# Patient Record
Sex: FEMALE | Race: WHITE | NOT HISPANIC OR LATINO | Employment: FULL TIME | URBAN - METROPOLITAN AREA
[De-identification: names, ages, dates, MRNs, and addresses within clinical notes are randomized per-mention and may not be internally consistent; named-entity substitution may affect disease eponyms.]

---

## 2017-01-03 ENCOUNTER — APPOINTMENT (OUTPATIENT)
Dept: URGENT CARE | Age: 27
End: 2017-01-03
Payer: COMMERCIAL

## 2017-01-03 ENCOUNTER — APPOINTMENT (OUTPATIENT)
Dept: LAB | Age: 27
End: 2017-01-03
Attending: NURSE PRACTITIONER
Payer: COMMERCIAL

## 2017-01-03 ENCOUNTER — TRANSCRIBE ORDERS (OUTPATIENT)
Dept: URGENT CARE | Age: 27
End: 2017-01-03

## 2017-01-03 DIAGNOSIS — J04.0 ACUTE LARYNGITIS: ICD-10-CM

## 2017-01-03 PROCEDURE — 87070 CULTURE OTHR SPECIMN AEROBIC: CPT

## 2017-01-03 PROCEDURE — G0382 LEV 3 HOSP TYPE B ED VISIT: HCPCS

## 2017-01-03 PROCEDURE — 99283 EMERGENCY DEPT VISIT LOW MDM: CPT

## 2017-01-05 LAB — BACTERIA THROAT CULT: NORMAL

## 2017-01-17 ENCOUNTER — OFFICE VISIT (OUTPATIENT)
Dept: URGENT CARE | Facility: MEDICAL CENTER | Age: 27
End: 2017-01-17
Payer: COMMERCIAL

## 2017-01-17 PROCEDURE — 99283 EMERGENCY DEPT VISIT LOW MDM: CPT

## 2017-01-17 PROCEDURE — G0382 LEV 3 HOSP TYPE B ED VISIT: HCPCS

## 2017-03-24 ENCOUNTER — GENERIC CONVERSION - ENCOUNTER (OUTPATIENT)
Dept: OTHER | Facility: OTHER | Age: 27
End: 2017-03-24

## 2017-04-12 ENCOUNTER — ALLSCRIPTS OFFICE VISIT (OUTPATIENT)
Dept: OTHER | Facility: OTHER | Age: 27
End: 2017-04-12

## 2017-04-12 LAB
BILIRUB UR QL STRIP: NORMAL
CLARITY UR: NORMAL
COLOR UR: YELLOW
GLUCOSE (HISTORICAL): NORMAL
HGB UR QL STRIP.AUTO: 1
KETONES UR STRIP-MCNC: NORMAL MG/DL
LEUKOCYTE ESTERASE UR QL STRIP: NORMAL
NITRITE UR QL STRIP: NORMAL
PH UR STRIP.AUTO: NORMAL [PH]
PROT UR STRIP-MCNC: NORMAL MG/DL
SP GR UR STRIP.AUTO: NORMAL
UROBILINOGEN UR QL STRIP.AUTO: NORMAL

## 2017-04-12 PROCEDURE — 87510 GARDNER VAG DNA DIR PROBE: CPT | Performed by: NURSE PRACTITIONER

## 2017-04-12 PROCEDURE — 87480 CANDIDA DNA DIR PROBE: CPT | Performed by: NURSE PRACTITIONER

## 2017-04-12 PROCEDURE — 87660 TRICHOMONAS VAGIN DIR PROBE: CPT | Performed by: NURSE PRACTITIONER

## 2017-04-13 ENCOUNTER — LAB REQUISITION (OUTPATIENT)
Dept: LAB | Facility: HOSPITAL | Age: 27
End: 2017-04-13
Payer: COMMERCIAL

## 2017-04-13 DIAGNOSIS — Z11.3 ENCOUNTER FOR SCREENING FOR INFECTIONS WITH PREDOMINANTLY SEXUAL MODE OF TRANSMISSION: ICD-10-CM

## 2017-04-13 PROCEDURE — 87491 CHLMYD TRACH DNA AMP PROBE: CPT | Performed by: NURSE PRACTITIONER

## 2017-04-13 PROCEDURE — 87591 N.GONORRHOEAE DNA AMP PROB: CPT | Performed by: NURSE PRACTITIONER

## 2017-04-15 LAB
CANDIDA RRNA VAG QL PROBE: NEGATIVE
CHLAMYDIA DNA CVX QL NAA+PROBE: NORMAL
G VAGINALIS RRNA GENITAL QL PROBE: NEGATIVE
N GONORRHOEA DNA GENITAL QL NAA+PROBE: NORMAL
T VAGINALIS RRNA GENITAL QL PROBE: NEGATIVE

## 2017-04-18 ENCOUNTER — GENERIC CONVERSION - ENCOUNTER (OUTPATIENT)
Dept: OTHER | Facility: OTHER | Age: 27
End: 2017-04-18

## 2017-10-25 ENCOUNTER — ALLSCRIPTS OFFICE VISIT (OUTPATIENT)
Dept: OTHER | Facility: OTHER | Age: 27
End: 2017-10-25

## 2017-11-02 ENCOUNTER — ALLSCRIPTS OFFICE VISIT (OUTPATIENT)
Dept: OTHER | Facility: OTHER | Age: 27
End: 2017-11-02

## 2017-11-02 DIAGNOSIS — F90.9 ATTENTION-DEFICIT HYPERACTIVITY DISORDER: ICD-10-CM

## 2017-11-02 NOTE — PROGRESS NOTES
Assessment  1  Acute laryngitis (464 00) (J04 0)    Plan  Acute laryngitis    · Zithromax Z-Alber 250 MG Oral Tablet (Azithromycin); TAKE 2 TABLETS ON DAY 1  THEN TAKE 1 TABLET A DAY FOR 4 DAYS    Discussion/Summary    Start abxof fluids and restif worsening  Chief Complaint  Patient presents today c/o runny nose, cough with yellow sputum and sore throat since Thursday  Patient has tried OTC's with no relief  History of Present Illness  HPI: since last thursday has been having sore throat, cough, runny, improvingvoice      Review of Systems    Constitutional: no fever,-- not feeling poorly,-- no chills-- and-- not feeling tired  ENT: sore throat-- and-- nasal discharge, but-- no earache,-- no nosebleeds-- and-- no hearing loss  Cardiovascular: the heart rate was not slow,-- no chest pain,-- the heart rate was not fast-- and-- no palpitations  Respiratory: cough, but-- no shortness of breath,-- no wheezing-- and-- no shortness of breath during exertion  Gastrointestinal: no abdominal pain,-- no nausea,-- no constipation-- and-- no diarrhea  Genitourinary: no dysuria,-- no pelvic pain,-- no incontinence-- and-- no dysmenorrhea  Musculoskeletal: no arthralgias,-- no joint swelling,-- no myalgias-- and-- no joint stiffness  Neurological: no headache,-- no numbness,-- no confusion-- and-- no dizziness  Active Problems  1  Acute conjunctivitis (372 00) (H10 30)   2  Acute laryngitis (464 00) (J04 0)   3  Acute rhinitis (460) (J00)   4  Attention-deficit hyperactivity disorder (314 01) (F90 9)   5  Conjunctivitis (372 30) (H10 9)   6  Contact dermatitis (692 9) (L25 9)   7  Dysmenorrhea (625 3) (N94 6)   8  Encounter for insertion of mirena IUD (V25 11) (Z30 430)   9  Encounter for IUD removal (V25 12) (Z30 432)   10  Encounter for other contraceptive management (V25 8) (Z30 8)   11   Encounter for routine gynecological examination with Papanicolaou smear of cervix    (V72 31,V76 2) (Z01 419) 12  Facial rash (782 1) (R21)   13  Female pelvic pain (625 9) (R10 2)   14  Human papilloma virus infection (079 4) (B97 7)   15  Immunity status testing (V72 61) (Z01 84)   16  IUD check up (V25 42) (Z30 431)   17  Denied: History of IUD contraception   18  IUD threads lost (996 32)   19  Need for diphtheria-tetanus-pertussis (Tdap) vaccine (V06 1) (Z23)   20  Need for measles-mumps-rubella (MMR) vaccine (V06 4) (Z23)   21  Postcoital bleeding (626 7) (N93 0)   22  PPD screening test (V74 1) (Z11 1)   23  Routine Gynecological Exam With Cervical Pap Smear (V72 31)   24  Screening for STD (sexually transmitted disease) (V74 5) (Z11 3)   25  Sore throat (462) (J02 9)   26  Symptoms involving urinary system (788 99) (R39 9)   27  Unconfirmed pregnancy (V72 40) (Z32 00)   28  Vaginal discharge (623 5) (N89 8)   29  Vaginal itching (698 1) (L29 8)   30  Vulvar burning (625 9) (N94 89)    Past Medical History  Active Problems And Past Medical History Reviewed: The active problems and past medical history were reviewed and updated today  Surgical History  Surgical History Reviewed: The surgical history was reviewed and updated today  Social History   · Being A Social Drinker   ·    · Denied: History of Drug Use   · Denied: History of IUD contraception   · Never A Smoker  The social history was reviewed and updated today  The social history was reviewed and is unchanged  Family History  Family History Reviewed: The family history was reviewed and updated today  Current Meds   1  Amphetamine-Dextroamphet ER 20 MG Oral Capsule Extended Release 24 Hour; TAKE   1 CAPSULE DAILY; Therapy: 22WMT1014 to (Evaluate:16Nov2017); Last Rx:19Oct2017 Ordered   2  NuvaRing 0 12-0 015 MG/24HR Vaginal Ring; INSERT 1 RING VAGINALLY FOR 3   WEEKS THEN 1 WEEK OFF; Therapy: 90AJH4911 to (Evaluate:63Lxn4732)  Requested for: 21Apr2017; Last   Rx:21Apr2017 Ordered   3   PredniSONE 10 MG Oral Tablet; 5,8,4,3,6,4;   Therapy: 16UGB4486 to (Last Rx:17Jan2017)  Requested for: 94UZA7446 Ordered   4  Triamcinolone Acetonide 0 1 % External Cream; APPLY  AND RUB  IN A THIN FILM TO   AFFECTED AREAS TWICE DAILY  (AM AND PM)  FOR 5 DAYS; Therapy: 20KDW9648 to (Last Rx:17Jan2017)  Requested for: 74JJE3486 Ordered    The medication list was reviewed and updated today  Allergies  1  LevoFLOXacin TABS  2  No Known Environmental Allergies   3  No Known Food Allergies    Vitals   Recorded: 74EJJ2796 12:21PM   Temperature 97 8 F   Heart Rate 85   Respiration 16   Systolic 393, LUE, Sitting   Diastolic 78, LUE, Sitting   Height 5 ft 4 in   Weight 134 lb 0 6 oz   BMI Calculated 23 01   BSA Calculated 1 65   O2 Saturation 96     Physical Exam    Constitutional   General appearance: No acute distress, well appearing and well nourished  Eyes   Conjunctiva and lids: No swelling, erythema or discharge  Pupils and irises: Equal, round and reactive to light  Ears, Nose, Mouth, and Throat   External inspection of ears and nose: Normal     Otoscopic examination: Tympanic membranes translucent with normal light reflex  Canals patent without erythema  Nasal mucosa, septum, and turbinates: Normal without edema or erythema  Oropharynx: Normal with no erythema, edema, exudate or lesions  Pulmonary   Respiratory effort: No increased work of breathing or signs of respiratory distress  Auscultation of lungs: Clear to auscultation  Cardiovascular   Palpation of heart: Normal PMI, no thrills  Auscultation of heart: Normal rate and rhythm, normal S1 and S2, without murmurs  Examination of extremities for edema and/or varicosities: Normal     Carotid pulses: Normal     Abdomen   Abdomen: Non-tender, no masses  Liver and spleen: No hepatomegaly or splenomegaly  Lymphatic   Palpation of lymph nodes in neck: No lymphadenopathy      Musculoskeletal   Gait and station: Normal     Digits and nails: Normal without clubbing or cyanosis  Inspection/palpation of joints, bones, and muscles: Normal     Skin   Skin and subcutaneous tissue: Normal without rashes or lesions  Psychiatric   Orientation to person, place, and time: Normal     Mood and affect: Normal          Future Appointments    Date/Time Provider Specialty Site   11/02/2017 02:30 PM CAITIE Mckeon   [facility]     Signatures   Electronically signed by : Giovanna Lin; Nov 1 2017  3:26PM EST                       (Author)    Electronically signed by : Bong Burns DO; Nov 1 2017  3:35PM EST                       (Author)

## 2017-11-08 ENCOUNTER — LAB CONVERSION - ENCOUNTER (OUTPATIENT)
Dept: OTHER | Facility: OTHER | Age: 27
End: 2017-11-08

## 2017-11-08 LAB — QUESTION/PROBLEM (HISTORICAL): NORMAL

## 2017-11-09 ENCOUNTER — LAB CONVERSION - ENCOUNTER (OUTPATIENT)
Dept: OTHER | Facility: OTHER | Age: 27
End: 2017-11-09

## 2017-11-09 LAB
CONTACT: (HISTORICAL): NORMAL
TEST INFORMATION (HISTORICAL): NORMAL
TEST NAME (HISTORICAL): NORMAL

## 2017-12-28 ENCOUNTER — ALLSCRIPTS OFFICE VISIT (OUTPATIENT)
Dept: OTHER | Facility: OTHER | Age: 27
End: 2017-12-28

## 2017-12-28 DIAGNOSIS — Z01.818 ENCOUNTER FOR OTHER PREPROCEDURAL EXAMINATION: ICD-10-CM

## 2017-12-28 LAB — HCG, QUALITATIVE (HISTORICAL): NEGATIVE

## 2017-12-29 ENCOUNTER — LAB CONVERSION - ENCOUNTER (OUTPATIENT)
Dept: OTHER | Facility: OTHER | Age: 27
End: 2017-12-29

## 2017-12-29 LAB
DEPRECATED RDW RBC AUTO: 12 % (ref 11–15)
HCT VFR BLD AUTO: 37.6 % (ref 35–45)
HGB BLD-MCNC: 13.2 G/DL (ref 11.7–15.5)
MCH RBC QN AUTO: 31.1 PG (ref 27–33)
MCHC RBC AUTO-ENTMCNC: 35.1 G/DL (ref 32–36)
MCV RBC AUTO: 88.5 FL (ref 80–100)
PLATELET # BLD AUTO: 315 THOUSAND/UL (ref 140–400)
PMV BLD AUTO: 10 FL (ref 7.5–12.5)
RBC # BLD AUTO: 4.25 MILLION/UL (ref 3.8–5.1)
WBC # BLD AUTO: 6.2 THOUSAND/UL (ref 3.8–10.8)

## 2018-01-10 NOTE — MISCELLANEOUS
Message         Active Problems    1  Acute conjunctivitis (372 00) (H10 30)   2  Acute laryngitis (464 00) (J04 0)   3  Acute rhinitis (460) (J00)   4  Attention-deficit hyperactivity disorder (314 01) (F90 9)   5  Conjunctivitis (372 30) (H10 9)   6  Contact dermatitis (692 9) (L25 9)   7  Dysmenorrhea (625 3) (N94 6)   8  Encounter for insertion of mirena IUD (V25 11) (Z30 430)   9  Encounter for IUD removal (V25 12) (Z30 432)   10  Encounter for other contraceptive management (V25 8) (Z30 8)   11  Encounter for routine gynecological examination with Papanicolaou smear of cervix    (V72 31,V76 2) (Z01 419)   12  Facial rash (782 1) (R21)   13  Female pelvic pain (625 9) (R10 2)   14  Human papilloma virus infection (079 4) (B97 7)   15  Immunity status testing (V72 61) (Z01 84)   16  IUD check up (V25 42) (Z30 431)   17  IUD contraception (V45 51) (Z97 5)   18  IUD threads lost (996 32)   19  Need for diphtheria-tetanus-pertussis (Tdap) vaccine (V06 1) (Z23)   20  Need for measles-mumps-rubella (MMR) vaccine (V06 4) (Z23)   21  Postcoital bleeding (626 7) (N93 0)   22  PPD screening test (V74 1) (Z11 1)   23  Routine Gynecological Exam With Cervical Pap Smear (V72 31)   24  Screening for STD (sexually transmitted disease) (V74 5) (Z11 3)   25  Sore throat (462) (J02 9)   26  Unconfirmed pregnancy (V72 40) (Z32 00)   27  Vaginal discharge (623 5) (N89 8)   28  Vaginal itching (698 1) (L29 8)   29  Vulvar burning (625 9) (N94 89)    Current Meds   1  Amphetamine-Dextroamphet ER 20 MG Oral Capsule Extended Release 24 Hour; TAKE   1 CAPSULE DAILY; Therapy: 72TGW7810 to (Evaluate:63Pvp5226); Last Rx:03Mar2017 Ordered   2  NuvaRing 0 12-0 015 MG/24HR Vaginal Ring; INSERT 1 RING VAGINALLY FOR 3   WEEKS THEN 1 WEEK OFF; Therapy: 23KEX4030 to (Evaluate:74Pbh6556)  Requested for: 24Oct2016; Last   Rx:06Oct2016 Ordered   3   PredniSONE 10 MG Oral Tablet; 6,5,4,3,2,1;   Therapy: 12WSV6036 to (Last Rx:17Jan2017) Requested for: 99YVH3433 Ordered   4  Triamcinolone Acetonide 0 1 % External Cream; APPLY  AND RUB  IN A THIN FILM TO   AFFECTED AREAS TWICE DAILY  (AM AND PM)  FOR 5 DAYS; Therapy: 80DCY7810 to (Last Rx:17Jan2017)  Requested for: 69AZN1693 Ordered    Allergies    1  LevoFLOXacin TABS    2  No Known Environmental Allergies   3   No Known Food Allergies    Signatures   Electronically signed by : Juancarlos Weathers DO; Mar 28 2017 11:04AM EST

## 2018-01-11 NOTE — MISCELLANEOUS
Provider Comments  Provider Comments:   Patient did not come to her scheduled appointment today 01/18/16 so a message was left for her to   call the office to reschedule another appointment        Signatures   Electronically signed by : CAITIE Pereira ; Jan 18 2016  2:09PM EST                       (Author)

## 2018-01-13 VITALS
OXYGEN SATURATION: 96 % | BODY MASS INDEX: 22.88 KG/M2 | DIASTOLIC BLOOD PRESSURE: 78 MMHG | HEIGHT: 64 IN | RESPIRATION RATE: 16 BRPM | WEIGHT: 134.04 LBS | SYSTOLIC BLOOD PRESSURE: 119 MMHG | HEART RATE: 85 BPM | TEMPERATURE: 97.8 F

## 2018-01-13 NOTE — MISCELLANEOUS
To Whom It May Concern,    Please excuse Beth Sharee from work 10/24/17 and 10/25/17 to return 10/26/17  Thank you            Electronically signed by:Amalia Li  Oct 25 2017 12:58PM EST

## 2018-01-14 VITALS — SYSTOLIC BLOOD PRESSURE: 128 MMHG | DIASTOLIC BLOOD PRESSURE: 82 MMHG

## 2018-01-16 NOTE — RESULT NOTES
Verified Results  (1) THIN PREP PAP WITH IMAGING 15VQN5118 51:76WJ Som Kellogg Order Number: [id number]     Test Name Result Flag Reference   LAB AP CASE REPORT (Report)     Gynecologic Cytology Report            Case: [id number]                  Authorizing Provider: CARLEY Malloy    Collected:      06/22/2016 1135        First Screen:     ANDREWS Lewis   Received:      06/27/2016 0845        Specimen:  LIQUID-BASED PAP, SCREENING, Cervix   LAB AP GYN PRIMARY INTERPRETATION      Negative for intraepithelial lesion or malignancy  Electronically signed by ANDREWS Lewis on 6/29/2016 at 12:36 PM   LAB AP GYN SPECIMEN ADEQUACY      Satisfactory for evaluation  Endocervical/transformation zone component present  LAB AP GYN ADDITIONAL INFORMATION (Report)     [employer]'s FDA approved ,  and ThinPrep Imaging System are   utilized with strict adherence to the 's instruction manual to   prepare gynecologic and non-gynecologic cytology specimens for the   production of ThinPrep slides as well as for gynecologic ThinPrep imaging  These processes have been validated by our laboratory and/or by the     The Pap test is not a diagnostic procedure and should not be used as the   sole means to detect cervical cancer  It is only a screening procedure to   aid in the detection of cervical cancer and its precursors  Both   false-negative and false-positive results have been experienced  Your   patient's test result should be interpreted in this context together with   the history and clinical findings  LAB AP LMP 6/16/2016     Performing Comments: CERVICAL - ENDOCERVICAL  ROUTINE PAP TEST WITH STD TESTING INCLUDED

## 2018-01-17 NOTE — RESULT NOTES
Verified Results  (1) CHLAMYDIA/GC AMPLIFIED DNA, PCR [id number] 76:57JK Located within [facility]     Test Name Result Flag Reference   CHLAMYDIA,AMPLIFIED DNA PROBE   C  trachomatis Amplified DNA Negative   C  trachomatis Amplified DNA Negative   N  GONORRHOEAE AMPLIFIED DNA   N  gonorrhoeae Amplified DNA Negative   N  gonorrhoeae Amplified DNA Negative     (1) VAGINITIS/ VAGINOSIS, DNA ( AFFIRM) [id number] 73:57RD Located within [facility]     Test Name Result Flag Reference   CANDIDA SPECIES Negative  Negative   GARDNERELLA VAGINALIS Negative  Negative   TRICHOMONAS VAGINALIS Negative  Negative   Performed at:  [facility] [place] [address]  [id number]  : Sara Schneider MD, Phone:  [phone]

## 2018-01-17 NOTE — RESULT NOTES
Verified Results  (1) CHLAMYDIA/GC AMPLIFIED DNA, PCR [id number] 12:11RK [facility]     Test Name Result Flag Reference   CHLAMYDIA,AMPLIFIED DNA PROBE   C  trachomatis Amplified DNA Negative   C  trachomatis Amplified DNA Negative   N  GONORRHOEAE AMPLIFIED DNA   N  gonorrhoeae Amplified DNA Negative   N  gonorrhoeae Amplified DNA Negative

## 2018-01-18 NOTE — PROGRESS NOTES
Assessment    1  Encounter for preventive health examination (V70 0) (Z00 00)   2  Attention-deficit/hyperactivity disorder (314 01) (F90 9)    Plan  Attention-deficit/hyperactivity disorder    · Amphetamine-Dextroamphet ER 20 MG Oral Capsule Extended Release 24  Hour; TAKE 1 CAPSULE DAILY   · (1) DRUG ABUSE SCREEN, URINE ROUTINE; Status:Active; Requested  for:02Nov2017;     Discussion/Summary  health maintenance visit Currently, she eats an adequate diet and has an adequate exercise regimen  cervical cancer screening is current Breast cancer screening: breast cancer screening is not indicated  Colorectal cancer screening: colorectal cancer screening is not indicated  Osteoporosis screening: bone mineral density testing is not indicated  The immunizations will be given as outlined in the orders  Narcotic contract signed today  UDS today  RTO 1 year  The patient was counseled regarding impressions  Possible side effects of new medications were reviewed with the patient/guardian today  The treatment plan was reviewed with the patient/guardian  The patient/guardian understands and agrees with the treatment plan      Chief Complaint  Wellness      History of Present Illness  HM, Adult Female: The patient is being seen for a health maintenance evaluation  The last health maintenance visit was >1 year(s) ago  Social History: Work status: not currently employed and Student, pharmaceutical marketing; Just finished nursing school  The patient has never smoked cigarettes  She reports occasional alcohol use  She has never used illicit drugs  General Health: The patient's health since the last visit is described as good  She has regular dental visits  She complains of vision problems  Vision care includes wearing glasses, wearing soft contact lenses and having regular eye examinations  She denies hearing loss  Hearing is normal    Lifestyle:  She consumes a diverse and healthy diet   She is on a diet and is generally adherent  She does not have any weight concerns  She exercises regularly  She exercises 3 or more times per week for 30 or more minutes per session  Exercise includes running/jogging and strength training  She does not use tobacco  The patient has never smoked cigarettes  She consumes alcohol  She reports occasional alcohol use  She typically drinks beer, wine and hard liquor  Alcohol concern: The patient has no concerns about alcohol abuse  She denies drug use  She has never used illicit drugs  Reproductive health:  she uses contraception  For contraception, she uses oral contraception pills  she is sexually active  Screening: cancer screening reviewed and current  Breast cancer screening includes no previous mammogram  She hasn't been previously screened for colorectal cancer  Review of Systems    Constitutional: no fever, no recent weight gain, no chills and no recent weight loss  Cardiovascular: no chest pain and no palpitations  Respiratory: no shortness of breath and no cough  Gastrointestinal: no abdominal pain, no constipation and no diarrhea  Genitourinary: no dysuria and no incontinence  Active Problems    1  Attention-deficit/hyperactivity disorder (314 01) (F90 9)   2  Encounter for insertion of mirena IUD (V25 11) (Z30 430)   3  Encounter for IUD removal (V25 12) (Z30 432)   4  Encounter for other contraceptive management (V25 8) (Z30 8)   5  Encounter for routine gynecological examination with Papanicolaou smear of cervix   (V72 31,V76 2) (Z01 419)   6  Human papilloma virus infection (079 4) (B97 7)   7  Denied: History of IUD contraception   8  Need for diphtheria-tetanus-pertussis (Tdap) vaccine (V06 1) (Z23)   9  Need for measles-mumps-rubella (MMR) vaccine (V06 4) (Z23)   10  Routine Gynecological Exam With Cervical Pap Smear (V72 31)   11   Screening for STD (sexually transmitted disease) (V74 5) (Z11 3)    Past Medical History    · History of Acute laryngitis (464 00) (J04 0)   · History of Acute rhinitis (460) (J00)   · History of Contusion of skin with intact surface (924 9) (T14 8XXA)   · History of Facial rash (782 1) (R21)   · History of Female pelvic pain (625 9) (R10 2)   · History of acute conjunctivitis (V12 49) (Z86 69)   · History of acute pharyngitis (V12 69) (Z87 09)   · History of conjunctivitis (V12 49) (Z86 69)   · History of contact dermatitis (V13 3) (Z87 2)   · History of dysmenorrhea (V13 29) (Z87 42)   · History of fatigue (V13 89) (C23 904)   · History of folliculitis (J96 6) (J46 4)   · History of sore throat (V12 60) (Z87 09)   · History of urinary frequency (V13 09) (K67 531)   · History of vaginal discharge (V13 29) (Z87 42)   · History of vaginal pruritus (V13 29) (Z87 42)   · History of viral infection (V12 09) (Z86 19)   · History of Immunity status testing (V72 61) (Z01 84)   · History of IUD check up (V25 42) (Z30 431)   · History of IUD threads lost (996 32)   · History of Postcoital bleeding (626 7) (N93 0)   · History of PPD screening test (V74 1) (Z11 1)   · History of Summary Of Previous Pregnancies  0  (Total No )   · History of Summary Of Previous Pregnancies Para 0  (Deliveries)   · History of Symptoms involving urinary system (788 99) (R39 9)   · History of Unconfirmed pregnancy (V72 40) (Z32 00)   · History of Vaginal candidiasis (112 1) (B37 3)   · History of Vulvar burning (625 9) (N94 89)    Surgical History    · History of Breast Surgery Enlargement Procedure Elective Bilateral   · History of Cervical Loop Electrosurgical Excision (LEEP)   · History of Colposcopy    Family History  Father    · Family history of malignant neoplasm of esophagus (V16 0) (Z80 0)  Family History    · Family history of Diabetes Mellitus (250 00)   · Family history of malignant neoplasm (V16 9) (Z80 9)    Social History    · Being A Social Drinker   ·    · Denied: History of Drug Use   · Denied: History of IUD contraception   · Never A Smoker    Current Meds   1  Amphetamine-Dextroamphet ER 20 MG Oral Capsule Extended Release 24 Hour; TAKE   1 CAPSULE DAILY; Therapy: 84QCO6015 to (Evaluate:16Nov2017); Last Rx:19Oct2017 Ordered   2  NuvaRing 0 12-0 015 MG/24HR Vaginal Ring; INSERT 1 RING VAGINALLY FOR 3   WEEKS THEN 1 WEEK OFF; Therapy: 94BPQ6836 to (Evaluate:13Wde9228)  Requested for: 21Apr2017; Last   Rx:21Apr2017 Ordered    Allergies    1  LevoFLOXacin TABS    2  No Known Environmental Allergies   3  No Known Food Allergies    Vitals   Recorded: 82JCM7095 02:34PM   Heart Rate 92   Systolic 149   Diastolic 62   Height 5 ft 4 in   Weight 132 lb 12 8 oz   BMI Calculated 22 8   BSA Calculated 1 64   O2 Saturation 98     Physical Exam    Constitutional   General appearance: No acute distress, well appearing and well nourished  Head and Face   Head and face: Normal     Eyes   Conjunctiva and lids: No swelling, erythema or discharge  Ears, Nose, Mouth, and Throat   Otoscopic examination: Tympanic membranes translucent with normal light reflex  Canals patent without erythema  Oropharynx: Normal with no erythema, edema, exudate or lesions  Neck   Neck: Supple, symmetric, trachea midline, no masses  Thyroid: Normal, no thyromegaly  Pulmonary   Respiratory effort: No increased work of breathing or signs of respiratory distress  Auscultation of lungs: Clear to auscultation  Cardiovascular   Auscultation of heart: Normal rate and rhythm, normal S1 and S2, no murmurs  Abdomen   Abdomen: Non-tender, no masses  Lymphatic   Palpation of lymph nodes in neck: No lymphadenopathy      Musculoskeletal   Gait and station: Normal     Psychiatric   Orientation to person, place, and time: Normal     Mood and affect: Normal        Signatures   Electronically signed by : CAITIE Howard ; Nov 2 2017  2:51PM EST                       (Author)

## 2018-01-22 VITALS
OXYGEN SATURATION: 98 % | SYSTOLIC BLOOD PRESSURE: 110 MMHG | DIASTOLIC BLOOD PRESSURE: 62 MMHG | HEIGHT: 64 IN | HEART RATE: 92 BPM | WEIGHT: 132.8 LBS | BODY MASS INDEX: 22.67 KG/M2

## 2018-01-23 VITALS
HEART RATE: 93 BPM | SYSTOLIC BLOOD PRESSURE: 110 MMHG | WEIGHT: 130.5 LBS | BODY MASS INDEX: 22.28 KG/M2 | DIASTOLIC BLOOD PRESSURE: 64 MMHG | OXYGEN SATURATION: 98 % | HEIGHT: 64 IN

## 2018-01-23 NOTE — CONSULTS
Chief Complaint  Patient presents to office today for a medical clearance on cosmetic surgery  History of Present Illness  Pre-Op Visit (Brief): The patient is being seen for a preoperative visit  The procedure is a(n) breast reduciton scheduled for 1/12/18 with Dr Tavo Tellez  The indication for surgery is back pain and breast augmentation a few years ago but feels that she went too big  Surgical Risk Assessment:   Prior Anesthesia: She had prior anesthesia and no prior adverse reaction to general anesthesia  Pertinent Past Medical History: no pertinent past medical history  Exercise Capacity: unable to walk four blocks without symptoms  Lifestyle Factors: denies tobacco use  Symptoms: no symptoms  Pertinent Family History: no pertinent family history  Living Situation: no post-op concerns with her living situation  Review of Systems    Constitutional: no fever, no recent weight gain, no chills and no recent weight loss  ENT: no sore throat and no nasal discharge  Cardiovascular: no chest pain and no palpitations  Respiratory: no shortness of breath and no cough  Gastrointestinal: no abdominal pain, no constipation and no diarrhea  Active Problems    1  Attention-deficit/hyperactivity disorder (314 01) (F90 9)   2  Encounter for insertion of mirena IUD (V25 11) (Z30 430)   3  Encounter for IUD removal (V25 12) (Z30 432)   4  Encounter for other contraceptive management (V25 8) (Z30 8)   5  Encounter for routine gynecological examination with Papanicolaou smear of cervix   (V72 31,V76 2) (Z01 419)   6  Human papilloma virus infection (079 4) (B97 7)   7  Influenza vaccine needed (V04 81) (Z23)   8  Denied: History of IUD contraception   9  Need for diphtheria-tetanus-pertussis (Tdap) vaccine (V06 1) (Z23)   10  Need for measles-mumps-rubella (MMR) vaccine (V06 4) (Z23)   11  Routine Gynecological Exam With Cervical Pap Smear (V72 31)   12   Screening for STD (sexually transmitted disease) (V74 5) (Z11 3)    Past Medical History    · History of Acute laryngitis (464 00) (J04 0)   · History of Acute rhinitis (460) (J00)   · History of Contusion of skin with intact surface (924 9) (T14 8XXA)   · History of Facial rash (782 1) (R21)   · History of Female pelvic pain (625 9) (R10 2)   · History of acute conjunctivitis (V12 49) (Z86 69)   · History of acute pharyngitis (V12 69) (Z87 09)   · History of conjunctivitis (V12 49) (Z86 69)   · History of contact dermatitis (V13 3) (Z87 2)   · History of dysmenorrhea (V13 29) (Z87 42)   · History of fatigue (V13 89) (A82 294)   · History of folliculitis (N66 3) (S00 2)   · History of sore throat (V12 60) (Z87 09)   · History of urinary frequency (V13 09) (Y83 059)   · History of vaginal discharge (V13 29) (Z87 42)   · History of vaginal pruritus (V13 29) (Z87 42)   · History of viral infection (V12 09) (Z86 19)   · History of Immunity status testing (V72 61) (Z01 84)   · History of IUD check up (V25 42) (Z30 431)   · History of IUD threads lost (996 32)   · History of Postcoital bleeding (626 7) (N93 0)   · History of PPD screening test (V74 1) (Z11 1)   · History of Summary Of Previous Pregnancies  0  (Total No )   · History of Summary Of Previous Pregnancies Para 0  (Deliveries)   · History of Symptoms involving urinary system (788 99) (R39 9)   · History of Unconfirmed pregnancy (V72 40) (Z32 00)   · History of Vaginal candidiasis (112 1) (B37 3)   · History of Vulvar burning (625 9) (N94 89)    The active problems and past medical history were reviewed and updated today  Surgical History    · History of Breast Surgery Enlargement Procedure Elective Bilateral   · History of Cervical Loop Electrosurgical Excision (LEEP)   · History of Colposcopy    The surgical history was reviewed and updated today         Family History    · Family history of Crohn's disease (V18 59) (Z83 79)    · Family history of malignant neoplasm of esophagus (V16 0) (Z80 0) · Family history of Diabetes Mellitus (250 00)   · Family history of malignant neoplasm (V16 9) (Z80 9)    The family history was reviewed and updated today  Social History    · Being A Social Drinker   ·    · Denied: History of Drug Use   · Denied: History of IUD contraception   · Never A Smoker  The social history was reviewed and updated today  Current Meds   1  Amphetamine-Dextroamphet ER 20 MG Oral Capsule Extended Release 24 Hour; TAKE   1 CAPSULE DAILY; Therapy: 33CLJ7870 to (Evaluate:41Ynv0140); Last Rx:02Nov2017 Ordered   2  NuvaRing 0 12-0 015 MG/24HR Vaginal Ring; INSERT 1 RING VAGINALLY FOR 3   WEEKS THEN 1 WEEK OFF; Therapy: 38PMZ9545 to (Evaluate:43Drk4829)  Requested for: 27MON2594; Last   Rx:09Nov2017 Ordered    The medication list was reviewed and updated today  Allergies    1  LevoFLOXacin TABS    2  No Known Environmental Allergies   3  No Known Food Allergies    Vitals  Signs    Heart Rate: 93  Systolic: 689  Diastolic: 64  Height: 5 ft 4 in  Weight: 130 lb 8 oz  BMI Calculated: 22 4  BSA Calculated: 1 63  O2 Saturation: 98    Physical Exam    Constitutional   General appearance: No acute distress, well appearing and well nourished  Head and Face   Head and face: Normal     Eyes   Conjunctiva and lids: No swelling, erythema or discharge  Ears, Nose, Mouth, and Throat   Otoscopic examination: Tympanic membranes translucent with normal light reflex  Canals patent without erythema  Oropharynx: Normal with no erythema, edema, exudate or lesions  Neck   Neck: Supple, symmetric, trachea midline, no masses  Thyroid: Normal, no thyromegaly  Pulmonary   Respiratory effort: No increased work of breathing or signs of respiratory distress  Auscultation of lungs: Clear to auscultation  Cardiovascular   Auscultation of heart: Normal rate and rhythm, normal S1 and S2, no murmurs  Abdomen   Abdomen: Non-tender, no masses      Lymphatic   Palpation of lymph nodes in neck: No lymphadenopathy  Musculoskeletal   Gait and station: Normal     Psychiatric   Orientation to person, place, and time: Normal     Mood and affect: Normal        Assessment    1  Preop examination (V72 84) (Z01 818)   2  Large breasts (611 1) (N62)   3  Attention-deficit/hyperactivity disorder (314 01) (F90 9)    Plan  Attention-deficit/hyperactivity disorder    · Amphetamine-Dextroamphet ER 20 MG Oral Capsule Extended Release 24  Hour; TAKE 1 CAPSULE DAILY  Preop examination    · (1) CBC/ PLT (NO DIFF); Status:Active; Requested for:16Azn9669;     Discussion/Summary  Surgical Clearance: She is at a LOW risk from a cardiovascular standpoint at this time without any additional cardiac testing  Reevaluation needed, if she should present with symptoms prior to surgery/procedure  Comments: Shabbir Jassolander Medically stable for procedure  Surgical clearance faxed to Dr Sweetie REYES PDMP checked and is appropriate  The patient was counseled regarding impressions  The treatment plan was reviewed with the patient/guardian  The patient/guardian understands and agrees with the treatment plan      End of Encounter Meds    1  Amphetamine-Dextroamphet ER 20 MG Oral Capsule Extended Release 24 Hour; TAKE   1 CAPSULE DAILY; Therapy: 99HBX4721 to (Evaluate:27Jan2018); Last Rx:95Oow4100 Ordered    2  NuvaRing 0 12-0 015 MG/24HR Vaginal Ring; INSERT 1 RING VAGINALLY FOR 3   WEEKS THEN 1 WEEK OFF;    Therapy: 37ENI7704 to (Evaluate:96Krp9619)  Requested for: 32BWX0786; Last   Rx:09Nov2017 Ordered    Signatures   Electronically signed by : CAITIE Price ; Dec 28 2017  2:57PM EST                       (Author)

## 2018-02-21 DIAGNOSIS — Z30.44 ENCOUNTER FOR SURVEILLANCE OF VAGINAL RING HORMONAL CONTRACEPTIVE DEVICE: Primary | ICD-10-CM

## 2018-02-21 RX ORDER — DEXTROAMPHETAMINE SACCHARATE, AMPHETAMINE ASPARTATE MONOHYDRATE, DEXTROAMPHETAMINE SULFATE AND AMPHETAMINE SULFATE 5; 5; 5; 5 MG/1; MG/1; MG/1; MG/1
1 CAPSULE, EXTENDED RELEASE ORAL DAILY
COMMUNITY
Start: 2014-09-04 | End: 2018-06-22 | Stop reason: SDUPTHER

## 2018-02-21 RX ORDER — ETONOGESTREL AND ETHINYL ESTRADIOL 11.7; 2.7 MG/1; MG/1
1 INSERT, EXTENDED RELEASE VAGINAL
Qty: 3 EACH | Refills: 1 | Status: SHIPPED | OUTPATIENT
Start: 2018-02-21 | End: 2018-09-28 | Stop reason: SDUPTHER

## 2018-02-21 RX ORDER — ETONOGESTREL AND ETHINYL ESTRADIOL 11.7; 2.7 MG/1; MG/1
INSERT, EXTENDED RELEASE VAGINAL
COMMUNITY
Start: 2016-06-22 | End: 2018-02-21 | Stop reason: SDUPTHER

## 2018-03-14 ENCOUNTER — OFFICE VISIT (OUTPATIENT)
Dept: INTERNAL MEDICINE CLINIC | Facility: CLINIC | Age: 28
End: 2018-03-14
Payer: COMMERCIAL

## 2018-03-14 VITALS
OXYGEN SATURATION: 96 % | HEIGHT: 64 IN | BODY MASS INDEX: 23.32 KG/M2 | HEART RATE: 88 BPM | DIASTOLIC BLOOD PRESSURE: 78 MMHG | RESPIRATION RATE: 16 BRPM | SYSTOLIC BLOOD PRESSURE: 122 MMHG | WEIGHT: 136.6 LBS

## 2018-03-14 DIAGNOSIS — F90.0 ATTENTION DEFICIT HYPERACTIVITY DISORDER (ADHD), PREDOMINANTLY INATTENTIVE TYPE: ICD-10-CM

## 2018-03-14 DIAGNOSIS — R12 HEARTBURN: Primary | ICD-10-CM

## 2018-03-14 PROCEDURE — 99214 OFFICE O/P EST MOD 30 MIN: CPT | Performed by: INTERNAL MEDICINE

## 2018-03-14 RX ORDER — DEXTROAMPHETAMINE SACCHARATE, AMPHETAMINE ASPARTATE MONOHYDRATE, DEXTROAMPHETAMINE SULFATE AND AMPHETAMINE SULFATE 5; 5; 5; 5 MG/1; MG/1; MG/1; MG/1
20 CAPSULE, EXTENDED RELEASE ORAL EVERY MORNING
Qty: 30 CAPSULE | Refills: 0 | Status: SHIPPED | OUTPATIENT
Start: 2018-03-14 | End: 2018-06-27 | Stop reason: SDUPTHER

## 2018-03-14 NOTE — PROGRESS NOTES
Assessment/Plan:    Zantac daily for 2 weeks  Ulcer free diet  Schedule with GI to discuss EGD  Consider gall bladder pathology although this seems less likely     Problem List Items Addressed This Visit     Attention-deficit/hyperactivity disorder     [place]MP checked         Relevant Medications    amphetamine-dextroamphetamine (ADDERALL XR) 20 MG 24 hr capsule    Heartburn - Primary    Relevant Orders    Ambulatory referral to Gastroenterology            Subjective:      Patient ID: Angeles Almeida is a 32 y o  female  HPI   Worried about heartburn for a few weeks  Denies change in her diet, stress  Denies weight loss, anorexia, dysphagia  Worse at night, aggravated by food  BMs normal , denies dark or blood in stools  Denies blood in stool dark stools  Taking Zantac and Nexium do not help which she has taken prn  Most relief is from Mylanta  Her father was diagnosed with esophageal cancer and  in his 45s  She has had an EGD after his death 6-7 years ago      The following portions of the patient's history were reviewed and updated as appropriate: allergies, current medications, past family history, past medical history, past social history, past surgical history and problem list     Review of Systems   Constitutional: Negative for fatigue, fever and unexpected weight change  HENT: Negative for ear pain, hearing loss, sinus pain, sinus pressure and sore throat  Respiratory: Negative for cough, shortness of breath and wheezing  Cardiovascular: Negative for chest pain, palpitations and leg swelling  Gastrointestinal: Negative for constipation, diarrhea, nausea and vomiting  See hpi   Musculoskeletal: Negative for arthralgias and myalgias  Neurological: Negative for dizziness and headaches           Objective:      /78 (BP Location: Right arm, Patient Position: Sitting, Cuff Size: Standard)   Pulse 88   Resp 16   Ht 5' 4" (1 626 m)   Wt 62 kg (136 lb 9 6 oz)   SpO2 96%   BMI 23 45 kg/m²          Physical Exam   Constitutional: She is oriented to person, place, and time  She appears well-developed and well-nourished  HENT:   Head: Normocephalic and atraumatic  Right Ear: External ear normal    Left Ear: External ear normal    Mouth/Throat: Oropharynx is clear and moist    Eyes: Conjunctivae are normal    Neck: Neck supple  Cardiovascular: Normal rate, regular rhythm and normal heart sounds  No murmur heard  Pulmonary/Chest: Effort normal and breath sounds normal  No respiratory distress  She has no wheezes  She has no rales  Abdominal: Soft  Bowel sounds are normal  She exhibits no distension and no mass  There is no tenderness  There is no rebound and no guarding  Musculoskeletal: Normal range of motion  Neurological: She is alert and oriented to person, place, and time  Skin: Skin is warm and dry  Psychiatric: She has a normal mood and affect   Her behavior is normal  Judgment and thought content normal

## 2018-05-10 ENCOUNTER — ANNUAL EXAM (OUTPATIENT)
Dept: GYNECOLOGY | Facility: CLINIC | Age: 28
End: 2018-05-10
Payer: COMMERCIAL

## 2018-05-10 VITALS
HEIGHT: 64 IN | WEIGHT: 137 LBS | BODY MASS INDEX: 23.39 KG/M2 | DIASTOLIC BLOOD PRESSURE: 70 MMHG | SYSTOLIC BLOOD PRESSURE: 120 MMHG

## 2018-05-10 DIAGNOSIS — Z01.419 ENCOUNTER FOR ROUTINE GYNECOLOGICAL EXAMINATION WITH PAPANICOLAOU SMEAR OF CERVIX: Primary | ICD-10-CM

## 2018-05-10 DIAGNOSIS — Z11.3 SCREEN FOR STD (SEXUALLY TRANSMITTED DISEASE): ICD-10-CM

## 2018-05-10 PROCEDURE — G0145 SCR C/V CYTO,THINLAYER,RESCR: HCPCS | Performed by: NURSE PRACTITIONER

## 2018-05-10 PROCEDURE — 87491 CHLMYD TRACH DNA AMP PROBE: CPT | Performed by: NURSE PRACTITIONER

## 2018-05-10 PROCEDURE — 99395 PREV VISIT EST AGE 18-39: CPT | Performed by: NURSE PRACTITIONER

## 2018-05-10 PROCEDURE — 87591 N.GONORRHOEAE DNA AMP PROB: CPT | Performed by: NURSE PRACTITIONER

## 2018-05-10 NOTE — PROGRESS NOTES
Subjective      Marguerite Alvarez is a 32 y o  female who presents for her annual exam  Periods are regular every 28-30 days, lasting 4 days  Dysmenorrhea:none  Cyclic symptoms include none  No intermenstrual bleeding, spotting, or discharge  The pt  is using the NuvaRing and prefers to continue with this  She does relate that over the past 2 wks  She has had light spotting with intercourse and this concerns her  She does state that she is in a new relationship  Current contraception: NuvaRing vaginal inserts  History of abnormal Pap smear: no  Family history of breast cancer: no  Past Medical History:   Diagnosis Date    ADHD (attention deficit hyperactivity disorder)      Family History   Problem Relation Age of Onset    Esophageal cancer Father      Past Surgical History:   Procedure Laterality Date    NO PAST SURGERIES       Social History     Social History    Marital status: Single     Spouse name: N/A    Number of children: N/A    Years of education: N/A     Occupational History    Not on file       Social History Main Topics    Smoking status: Never Smoker    Smokeless tobacco: Never Used    Alcohol use Yes      Comment: occassional    Drug use: No    Sexual activity: Yes     Partners: Male     Birth control/ protection: Ring     Other Topics Concern    Not on file     Social History Narrative    No narrative on file       Current Outpatient Prescriptions:     amphetamine-dextroamphetamine (ADDERALL XR) 20 MG 24 hr capsule, Take 1 capsule by mouth daily, Disp: , Rfl:     amphetamine-dextroamphetamine (ADDERALL XR) 20 MG 24 hr capsule, Take 1 capsule (20 mg total) by mouth every morning Max Daily Amount: 20 mg, Disp: 30 capsule, Rfl: 0    etonogestrel-ethinyl estradiol (NUVARING) 0 12-0 015 MG/24HR vaginal ring, Insert 1 each into the vagina every 28 days, Disp: 3 each, Rfl: 1      Review of Systems  Constitutional :no fever, feels well, no tiredness, no recent weight gain or loss  Cardiovascular: no complaints of slow or fast heart beat, no chest pain, no palpitations  Respiratory: no complaints of shortness of shortness of breath, no GONZALEZ  Breasts:no complaints of breast pain, breast lump, or nipple discharge  Gastrointestinal: no complaints of abdominal pain, constipation,nause, vomiting, or diarrhea or bloody stools  Genitourinary : no complaints of dysuria, incontinence, pelvic pain, dysmenorrhea,vaginal discharge or abnormal vaginal bleeding  Integumentary: no complaints of skin rash or lesion,itching or dry skin  Neurological: no complaints of headache,numbness, tingling, dizziness or fainting       Objective      /70   Ht 5' 4" (1 626 m)   Wt 62 1 kg (137 lb)   LMP 04/22/2018 Comment: NuvaRing  BMI 23 52 kg/m²     General:   appears stated age","cooperative","alert" normal mood and affect   Neck: Neck: normal, supple,trachea midline, no masses   Heart: regular rate and rhythm, S1, S2 normal, no murmur, click, rub or gallop   Lungs: clear to auscultation bilaterally   Breasts: Breast exam :normal, no dimpling or skin changes noted  Bilateral breast implants noted   Abdomen: soft, non-tender, without masses or organomegaly   Vulva: Normal , no lesions   Vagina: normal , no lesions or dryness   Urethra: normal   Cervix: Normal, no palpable masses A pap smear was done  STD testing was done also  Uterus: Normal , non-tender,not enlarged,no palpable masses   Adnexa: Normal, non-tender without fullness or masses                         Assessment     Normal GYN exam  Post coital bleeding  Contraceptive management     Plan      All questions answered  Await pap smear results  Breast self exam technique reviewed and patient encouraged to perform self-exam monthly  Chlamydia specimen  Follow up as needed  GC specimen  Thin prep Pap smear  We discussed the PCB and possible causes including a cervical polyp or STD  The pt  is aware that no polyp was noted on exam today   The pt  has been advised to observe the post coital bleeding at this time until STD results are available,

## 2018-05-11 LAB
CHLAMYDIA DNA CVX QL NAA+PROBE: NORMAL
N GONORRHOEA DNA GENITAL QL NAA+PROBE: NORMAL

## 2018-05-16 LAB
LAB AP GYN PRIMARY INTERPRETATION: NORMAL
Lab: NORMAL

## 2018-06-22 DIAGNOSIS — F90.9 ATTENTION DEFICIT HYPERACTIVITY DISORDER (ADHD), UNSPECIFIED ADHD TYPE: Primary | ICD-10-CM

## 2018-06-22 RX ORDER — DEXTROAMPHETAMINE SACCHARATE, AMPHETAMINE ASPARTATE MONOHYDRATE, DEXTROAMPHETAMINE SULFATE AND AMPHETAMINE SULFATE 5; 5; 5; 5 MG/1; MG/1; MG/1; MG/1
20 CAPSULE, EXTENDED RELEASE ORAL DAILY
Qty: 30 CAPSULE | Refills: 0 | Status: SHIPPED | OUTPATIENT
Start: 2018-06-22 | End: 2018-06-27 | Stop reason: SDUPTHER

## 2018-06-27 ENCOUNTER — TELEPHONE (OUTPATIENT)
Dept: INTERNAL MEDICINE CLINIC | Facility: CLINIC | Age: 28
End: 2018-06-27

## 2018-06-27 DIAGNOSIS — F90.9 ATTENTION DEFICIT HYPERACTIVITY DISORDER (ADHD), UNSPECIFIED ADHD TYPE: ICD-10-CM

## 2018-06-27 DIAGNOSIS — F90.0 ATTENTION DEFICIT HYPERACTIVITY DISORDER (ADHD), PREDOMINANTLY INATTENTIVE TYPE: Primary | ICD-10-CM

## 2018-06-27 RX ORDER — DEXTROAMPHETAMINE SACCHARATE, AMPHETAMINE ASPARTATE MONOHYDRATE, DEXTROAMPHETAMINE SULFATE AND AMPHETAMINE SULFATE 5; 5; 5; 5 MG/1; MG/1; MG/1; MG/1
20 CAPSULE, EXTENDED RELEASE ORAL DAILY
Qty: 30 CAPSULE | Refills: 0 | Status: SHIPPED | OUTPATIENT
Start: 2018-06-27 | End: 2018-07-18 | Stop reason: SDUPTHER

## 2018-06-27 NOTE — TELEPHONE ENCOUNTER
pts medication was supposed to go to [facility] [address] - can you re-send her prescription as she does not use [facility]/not near her house (not sure why it was sent there in the first place)

## 2018-06-28 ENCOUNTER — TELEPHONE (OUTPATIENT)
Dept: INTERNAL MEDICINE CLINIC | Facility: CLINIC | Age: 28
End: 2018-06-28

## 2018-06-28 DIAGNOSIS — B37.9 CANDIDIASIS: Primary | ICD-10-CM

## 2018-06-28 RX ORDER — FLUCONAZOLE 150 MG/1
150 TABLET ORAL
Qty: 1 TABLET | Refills: 0 | Status: SHIPPED | OUTPATIENT
Start: 2018-06-28 | End: 2018-07-02

## 2018-07-17 DIAGNOSIS — F90.9 ATTENTION DEFICIT HYPERACTIVITY DISORDER (ADHD), UNSPECIFIED ADHD TYPE: ICD-10-CM

## 2018-07-17 RX ORDER — DEXTROAMPHETAMINE SACCHARATE, AMPHETAMINE ASPARTATE MONOHYDRATE, DEXTROAMPHETAMINE SULFATE AND AMPHETAMINE SULFATE 5; 5; 5; 5 MG/1; MG/1; MG/1; MG/1
20 CAPSULE, EXTENDED RELEASE ORAL EVERY MORNING
Qty: 30 CAPSULE | Refills: 0 | Status: CANCELLED | OUTPATIENT
Start: 2018-07-17

## 2018-07-17 NOTE — TELEPHONE ENCOUNTER
Called [facility] They do not have a script for Anabelrrall for the pt dated 6/27/18  Pt contacted will discuss with a physician and see what they feel should be completed  The patient is due for a new script for July she will pick this script up

## 2018-07-18 DIAGNOSIS — F90.9 ATTENTION DEFICIT HYPERACTIVITY DISORDER (ADHD), UNSPECIFIED ADHD TYPE: ICD-10-CM

## 2018-07-18 RX ORDER — DEXTROAMPHETAMINE SACCHARATE, AMPHETAMINE ASPARTATE MONOHYDRATE, DEXTROAMPHETAMINE SULFATE AND AMPHETAMINE SULFATE 5; 5; 5; 5 MG/1; MG/1; MG/1; MG/1
20 CAPSULE, EXTENDED RELEASE ORAL DAILY
Qty: 30 CAPSULE | Refills: 0 | Status: SHIPPED | OUTPATIENT
Start: 2018-07-18 | End: 2018-10-02 | Stop reason: SDUPTHER

## 2018-07-19 ENCOUNTER — TELEPHONE (OUTPATIENT)
Dept: INTERNAL MEDICINE CLINIC | Facility: CLINIC | Age: 28
End: 2018-07-19

## 2018-07-19 NOTE — TELEPHONE ENCOUNTER
[facility] called a prior authorization needs to be completed for this patients Adderral 20 mg  Please call [facility] on [address] [phone]

## 2018-07-20 NOTE — TELEPHONE ENCOUNTER
Prior Auth done 7/5/18 for Adderall 20mg, not XR and approved from 7/5/18-1/2/19  The patient take Adderall XR  I resubmitted a prior authorization which is in clinical review and we will await determination within 24hrs  They approved a 1 month fill for now  I spoke to pharmacy and advised  LM for the patient

## 2018-08-28 ENCOUNTER — OFFICE VISIT (OUTPATIENT)
Dept: GASTROENTEROLOGY | Facility: CLINIC | Age: 28
End: 2018-08-28
Payer: COMMERCIAL

## 2018-08-28 VITALS
HEART RATE: 73 BPM | BODY MASS INDEX: 24.79 KG/M2 | SYSTOLIC BLOOD PRESSURE: 123 MMHG | HEIGHT: 64 IN | TEMPERATURE: 97.7 F | WEIGHT: 145.2 LBS | DIASTOLIC BLOOD PRESSURE: 75 MMHG

## 2018-08-28 DIAGNOSIS — Z80.0 FAMILY HISTORY OF ESOPHAGEAL CANCER: ICD-10-CM

## 2018-08-28 DIAGNOSIS — K21.9 GASTROESOPHAGEAL REFLUX DISEASE WITHOUT ESOPHAGITIS: Primary | ICD-10-CM

## 2018-08-28 PROCEDURE — 99244 OFF/OP CNSLTJ NEW/EST MOD 40: CPT | Performed by: INTERNAL MEDICINE

## 2018-08-28 NOTE — LETTER
August 30, 2018     Kraig Shipman MD  [address]  [address] [place] [address]    Patient: Flaco No   YOB: 1990   Date of Visit: 8/28/2018       Dear Dr Katty Shawr: Thank you for referring Flaco No to me for evaluation  Below are my notes for this consultation  If you have questions, please do not hesitate to call me  I look forward to following your patient along with you  Sincerely,    CAITIE Sin  [facility]  Mobile: [phone]  Available on [employer]  [email or web address]           CC: No Recipients  Jose Enrique Williamson MD  8/30/2018  1:46 PM  Sign at close encounter  [facility] - Outpatient Consultation  Flaco No 29 y o  female MRN: [id number]  Encounter: [id number]      PCP: Kraig Shipman MD  Referring: Kraig Shipman MD  [address]  [address]      ASSESSMENT AND PLAN:      1  Gastroesophageal reflux disease without esophagitis  2  Family history of esophageal cancer  Mild intermittent symptoms of reflux associated with dietary, lifestyle triggers  I discussed anti reflux measures including elevating the head of her bed, taking as needed H2 antagonists, avoiding late night eating, avoiding dietary triggers, limiting alcohol intake that will significantly improve her symptoms  I offered her an educational handout  Given her family history of esophageal cancer, will plan for EGD for H pylori testing and eradication   - Case request operating room: ESOPHAGOGASTRODUODENOSCOPY (EGD); Standing  - Case request operating room: ESOPHAGOGASTRODUODENOSCOPY (EGD)      ______________________________________________________________________    HPI:     Patient is a 55-year-old female referred to me for GERD symptoms  She has a past medical history significant only for EGD  She complains of symptoms ongoing since March, for approximately the last five months    She complains of acid reflux, heartburn she with regurgitation of epigastric burning  She describes a sensation of "rock in her throat"  The symptoms occur approximately 2-3 times per month, primarily occurring after dinner  She recognizes significant trigger foods including wine, tomato sauces, garlic, chocolate intake  She admits to drinking alcohol 2-3 times per week, and 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   She was taking Zantac daily, but discontinued this she now takes as needed  She did admits to a 15lb weight gain over the same time and is concerned about her weight today at the visit  She does take Excedrin occasionally for headaches  she admits her symptoms reflux are intermittent, and tolerable  They did not significantly affect her quality of life  Her primary concern today is that her father was diagnosed with esophageal cancer in his 45s, and subsequently passed away  He was not a drinker or smoker  She did have an EGD done in 2013 and requests a repeat  REVIEW OF SYSTEMS:    CONSTITUTIONAL: Denies any fever, chills, rigors, and weight loss  HEENT: No earache or tinnitus  Denies hearing loss or visual disturbances  CARDIOVASCULAR: No chest pain or palpitations  RESPIRATORY: Denies any cough, hemoptysis, shortness of breath or dyspnea on exertion  GASTROINTESTINAL: As noted in the History of Present Illness  GENITOURINARY: No problems with urination  Denies any hematuria or dysuria  NEUROLOGIC: No dizziness or vertigo, denies headaches  MUSCULOSKELETAL: Denies any muscle or joint pain  SKIN: Denies skin rashes or itching  ENDOCRINE: Denies excessive thirst  Denies intolerance to heat or cold  PSYCHOSOCIAL: Denies depression or anxiety  Denies any recent memory loss         Historical Information   Past Medical History:   Diagnosis Date    ADHD (attention deficit hyperactivity disorder)     Dysmenorrhea     Last Assessed: 12/22/2015    Postcoital bleeding     Last Assessed: 2/19/2015     Vaginal pruritus     Resolved: 11/2/2017      Past Surgical History:   Procedure Laterality Date    AUGMENTATION BREAST      CERVICAL BIOPSY  W/ LOOP ELECTRODE EXCISION      COLPOSCOPY      RECONSTRUCTION / CORRECTION OF NIPPLE / Sherri Labella      SURGERY OF LIP       Social History   History   Alcohol Use    Yes     Comment: occassional, per allscripts: Being A Social Drinker      History   Drug Use No     History   Smoking Status    Never Smoker   Smokeless Tobacco    Never Used     Family History   Problem Relation Age of Onset    Esophageal cancer Father     Crohn's disease Mother     Diabetes Family     Cancer Family        Meds/Allergies       Current Outpatient Prescriptions:     amphetamine-dextroamphetamine (ADDERALL XR) 20 MG 24 hr capsule    etonogestrel-ethinyl estradiol (NUVARING) 0 12-0 015 MG/24HR vaginal ring    Allergies   Allergen Reactions    Levofloxacin Other (See Comments)           Objective     Blood pressure 123/75, pulse 73, temperature 97 7 °F (36 5 °C), temperature source Tympanic, height 5' 4" (1 626 m), weight 65 9 kg (145 lb 3 2 oz), not currently breastfeeding  Body mass index is 24 92 kg/m²  PHYSICAL EXAM:      General Appearance:   Alert, cooperative, no distress   HEENT:   Normocephalic, atraumatic, anicteric      Neck:  Supple, symmetrical, trachea midline   Lungs:   Clear to auscultation bilaterally; no rales, rhonchi or wheezing; respirations unlabored    Heart[de-identified]   Regular rate and rhythm; no murmur, rub, or gallop     Abdomen:   Soft, non-tender, non-distended; normal bowel sounds; no masses, no organomegaly    Genitalia:   Deferred    Rectal:   Deferred    Extremities:  No cyanosis, clubbing or edema    Pulses:  2+ and symmetric    Skin:  No jaundice, rashes, or lesions    Lymph nodes:  No palpable cervical lymphadenopathy        Lab Results:     Lab Results   Component Value Date    WBC 6 2 12/28/2017    HGB 13 2 12/28/2017    HCT 37 6 12/28/2017 MCV 88 5 12/28/2017     12/28/2017       Lab Results   Component Value Date     05/02/2015    K 3 2 (L) 05/02/2015     05/02/2015    CO2 28 05/02/2015    ANIONGAP 10 05/02/2015    BUN 17 05/02/2015    CREATININE 0 55 (L) 05/02/2015    GLUCOSE 75 05/02/2015    CALCIUM 9 4 05/02/2015    AST 22 02/11/2014    ALT 35 02/11/2014    ALKPHOS 78 02/11/2014    PROT 7 9 02/11/2014    BILITOT 0 9 02/11/2014       Lab Results   Component Value Date    INR 1 06 02/11/2014    PROTIME 13 3 02/11/2014         Radiology Results:   No results found

## 2018-08-29 NOTE — PROGRESS NOTES
[facility] - Outpatient Consultation  Kinjal Trujillo 29 y o  female MRN: [id number]  Encounter: [id number]      PCP: Viktor Constantino MD  Referring: Viktor Constantino MD  [address]  [address]       ASSESSMENT AND PLAN:      1  Gastroesophageal reflux disease without esophagitis  2  Family history of esophageal cancer  Mild intermittent symptoms of reflux associated with dietary, lifestyle triggers  I discussed anti reflux measures including elevating the head of her bed, taking as needed H2 antagonists, avoiding late night eating, avoiding dietary triggers, limiting alcohol intake, avoiding NSAIDs that will significantly improve her symptoms  I offered her an educational handout  Given her family history of esophageal cancer, will plan for EGD for H pylori testing and eradication   - Case request operating room: ESOPHAGOGASTRODUODENOSCOPY (EGD); Standing  - Case request operating room: ESOPHAGOGASTRODUODENOSCOPY (EGD)      ______________________________________________________________________    HPI:     Patient is a 80-year-old female referred to me for GERD symptoms  She has a past medical history significant only for EGD  She complains of symptoms ongoing since March, for approximately the last five months  She complains of acid reflux, heartburn she with regurgitation of epigastric burning  She describes a sensation of "rock in her throat"  The symptoms occur approximately 2-3 times per month, primarily occurring after dinner  She recognizes significant trigger foods including wine, tomato sauces, garlic, chocolate intake  She admits to drinking alcohol 2-3 times per week, and 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   She was taking Zantac daily, but discontinued this she now takes as needed  She did admits to a 15lb weight gain over the same time and is concerned about her weight today at the visit     She does take Excedrin occasionally for headaches  she admits her symptoms reflux are intermittent, and tolerable  They did not significantly affect her quality of life  Her primary concern today is that her father was diagnosed with esophageal cancer in his 45s, and subsequently passed away  He was not a drinker or smoker  She did have an EGD done in 2013 and requests a repeat  REVIEW OF SYSTEMS:    CONSTITUTIONAL: Denies any fever, chills, rigors, and weight loss  HEENT: No earache or tinnitus  Denies hearing loss or visual disturbances  CARDIOVASCULAR: No chest pain or palpitations  RESPIRATORY: Denies any cough, hemoptysis, shortness of breath or dyspnea on exertion  GASTROINTESTINAL: As noted in the History of Present Illness  GENITOURINARY: No problems with urination  Denies any hematuria or dysuria  NEUROLOGIC: No dizziness or vertigo, denies headaches  MUSCULOSKELETAL: Denies any muscle or joint pain  SKIN: Denies skin rashes or itching  ENDOCRINE: Denies excessive thirst  Denies intolerance to heat or cold  PSYCHOSOCIAL: Denies depression or anxiety  Denies any recent memory loss         Historical Information   Past Medical History:   Diagnosis Date    ADHD (attention deficit hyperactivity disorder)     Dysmenorrhea     Last Assessed: 12/22/2015    Postcoital bleeding     Last Assessed: 2/19/2015     Vaginal pruritus     Resolved: 11/2/2017      Past Surgical History:   Procedure Laterality Date    AUGMENTATION BREAST      CERVICAL BIOPSY  W/ LOOP ELECTRODE EXCISION      COLPOSCOPY      RECONSTRUCTION / CORRECTION OF NIPPLE / Nabil Helm      SURGERY OF LIP       Social History   History   Alcohol Use    Yes     Comment: occassional, per allscripts: Being A Social Drinker      History   Drug Use No     History   Smoking Status    Never Smoker   Smokeless Tobacco    Never Used     Family History   Problem Relation Age of Onset    Esophageal cancer Father     Crohn's disease Mother    Ria Robles Diabetes Family     Cancer Family        Meds/Allergies       Current Outpatient Prescriptions:     amphetamine-dextroamphetamine (ADDERALL XR) 20 MG 24 hr capsule    etonogestrel-ethinyl estradiol (NUVARING) 0 12-0 015 MG/24HR vaginal ring    Allergies   Allergen Reactions    Levofloxacin Other (See Comments)           Objective     Blood pressure 123/75, pulse 73, temperature 97 7 °F (36 5 °C), temperature source Tympanic, height 5' 4" (1 626 m), weight 65 9 kg (145 lb 3 2 oz), not currently breastfeeding  Body mass index is 24 92 kg/m²  PHYSICAL EXAM:      General Appearance:   Alert, cooperative, no distress   HEENT:   Normocephalic, atraumatic, anicteric      Neck:  Supple, symmetrical, trachea midline   Lungs:   Clear to auscultation bilaterally; no rales, rhonchi or wheezing; respirations unlabored    Heart[de-identified]   Regular rate and rhythm; no murmur, rub, or gallop  Abdomen:   Soft, non-tender, non-distended; normal bowel sounds; no masses, no organomegaly    Genitalia:   Deferred    Rectal:   Deferred    Extremities:  No cyanosis, clubbing or edema    Pulses:  2+ and symmetric    Skin:  No jaundice, rashes, or lesions    Lymph nodes:  No palpable cervical lymphadenopathy        Lab Results:     Lab Results   Component Value Date    WBC 6 2 12/28/2017    HGB 13 2 12/28/2017    HCT 37 6 12/28/2017    MCV 88 5 12/28/2017     12/28/2017       Lab Results   Component Value Date     05/02/2015    K 3 2 (L) 05/02/2015     05/02/2015    CO2 28 05/02/2015    ANIONGAP 10 05/02/2015    BUN 17 05/02/2015    CREATININE 0 55 (L) 05/02/2015    GLUCOSE 75 05/02/2015    CALCIUM 9 4 05/02/2015    AST 22 02/11/2014    ALT 35 02/11/2014    ALKPHOS 78 02/11/2014    PROT 7 9 02/11/2014    BILITOT 0 9 02/11/2014       Lab Results   Component Value Date    INR 1 06 02/11/2014    PROTIME 13 3 02/11/2014         Radiology Results:   No results found

## 2018-09-07 ENCOUNTER — ANESTHESIA EVENT (OUTPATIENT)
Dept: PERIOP | Facility: AMBULARY SURGERY CENTER | Age: 28
End: 2018-09-07

## 2018-09-18 ENCOUNTER — TELEPHONE (OUTPATIENT)
Dept: GASTROENTEROLOGY | Facility: CLINIC | Age: 28
End: 2018-09-18

## 2018-09-18 ENCOUNTER — ANESTHESIA (OUTPATIENT)
Dept: PERIOP | Facility: AMBULARY SURGERY CENTER | Age: 28
End: 2018-09-18

## 2018-09-18 ENCOUNTER — PREP FOR PROCEDURE (OUTPATIENT)
Dept: GASTROENTEROLOGY | Facility: MEDICAL CENTER | Age: 28
End: 2018-09-18

## 2018-09-18 DIAGNOSIS — K21.9 GASTROESOPHAGEAL REFLUX DISEASE WITHOUT ESOPHAGITIS: ICD-10-CM

## 2018-09-18 DIAGNOSIS — Z80.0 FAMILY HISTORY OF ESOPHAGEAL CANCER: Primary | ICD-10-CM

## 2018-09-18 NOTE — PATIENT INSTRUCTIONS
[facility] scheduled [facility] Trudy Temple on 10/16/18 pt is aware she will get a call the day before with exact time of arrival

## 2018-09-18 NOTE — TELEPHONE ENCOUNTER
Pt scheduled at [facility] with dr Jayce Rincon on 10/16/18 pt is aware she will get a call the day before with exact time of arrival

## 2018-09-18 NOTE — TELEPHONE ENCOUNTER
SORAIDA PATIENT        SHE NEEDS TO RS HER EGD THAT SHE HAD TO CANCEL TODAY 9-18-18----CALL CELIO # [phone]

## 2018-09-28 ENCOUNTER — TELEPHONE (OUTPATIENT)
Dept: GYNECOLOGY | Facility: CLINIC | Age: 28
End: 2018-09-28

## 2018-09-28 DIAGNOSIS — Z30.44 ENCOUNTER FOR SURVEILLANCE OF VAGINAL RING HORMONAL CONTRACEPTIVE DEVICE: ICD-10-CM

## 2018-09-28 RX ORDER — ETONOGESTREL AND ETHINYL ESTRADIOL 11.7; 2.7 MG/1; MG/1
1 INSERT, EXTENDED RELEASE VAGINAL
Qty: 3 EACH | Refills: 3 | Status: SHIPPED | OUTPATIENT
Start: 2018-09-28 | End: 2019-02-12 | Stop reason: SDUPTHER

## 2018-09-28 NOTE — TELEPHONE ENCOUNTER
Stephanie Gómez from [address] called to ok refill for Nuvaring  Please call Pharmacy to refill

## 2018-10-02 DIAGNOSIS — F90.9 ATTENTION DEFICIT HYPERACTIVITY DISORDER (ADHD), UNSPECIFIED ADHD TYPE: ICD-10-CM

## 2018-10-02 DIAGNOSIS — B37.9 YEAST INFECTION: Primary | ICD-10-CM

## 2018-10-02 RX ORDER — FLUCONAZOLE 150 MG/1
150 TABLET ORAL ONCE
Qty: 1 TABLET | Refills: 0 | Status: SHIPPED | OUTPATIENT
Start: 2018-10-02 | End: 2018-10-02

## 2018-10-02 RX ORDER — DEXTROAMPHETAMINE SACCHARATE, AMPHETAMINE ASPARTATE MONOHYDRATE, DEXTROAMPHETAMINE SULFATE AND AMPHETAMINE SULFATE 5; 5; 5; 5 MG/1; MG/1; MG/1; MG/1
20 CAPSULE, EXTENDED RELEASE ORAL DAILY
Qty: 30 CAPSULE | Refills: 0 | Status: SHIPPED | OUTPATIENT
Start: 2018-10-02 | End: 2018-11-28 | Stop reason: SDUPTHER

## 2018-10-02 NOTE — TELEPHONE ENCOUNTER
Patient is requesting a refill on her Adderall and also something for a yeast infection  Patient states that she was recently placed on antibiotics for a gall bladder removal and feels a little symptomatic  Please advise  Patient has moved to [place] and notes that she will still be a patient here

## 2018-10-04 DIAGNOSIS — B37.9 CANDIDIASIS: Primary | ICD-10-CM

## 2018-10-04 RX ORDER — FLUCONAZOLE 150 MG/1
TABLET ORAL
Qty: 2 TABLET | Refills: 0 | Status: SHIPPED | OUTPATIENT
Start: 2018-10-04 | End: 2018-10-04 | Stop reason: SDUPTHER

## 2018-10-04 RX ORDER — FLUCONAZOLE 150 MG/1
TABLET ORAL
Qty: 2 TABLET | Refills: 1 | Status: SHIPPED | OUTPATIENT
Start: 2018-10-04 | End: 2018-10-08

## 2018-10-05 ENCOUNTER — ANESTHESIA EVENT (OUTPATIENT)
Dept: PERIOP | Facility: AMBULARY SURGERY CENTER | Age: 28
End: 2018-10-05

## 2018-10-16 ENCOUNTER — ANESTHESIA (OUTPATIENT)
Dept: PERIOP | Facility: AMBULARY SURGERY CENTER | Age: 28
End: 2018-10-16

## 2018-11-28 DIAGNOSIS — F90.9 ATTENTION DEFICIT HYPERACTIVITY DISORDER (ADHD), UNSPECIFIED ADHD TYPE: ICD-10-CM

## 2018-11-28 RX ORDER — DEXTROAMPHETAMINE SACCHARATE, AMPHETAMINE ASPARTATE MONOHYDRATE, DEXTROAMPHETAMINE SULFATE AND AMPHETAMINE SULFATE 5; 5; 5; 5 MG/1; MG/1; MG/1; MG/1
20 CAPSULE, EXTENDED RELEASE ORAL DAILY
Qty: 30 CAPSULE | Refills: 0 | Status: SHIPPED | OUTPATIENT
Start: 2018-11-28 | End: 2018-11-30 | Stop reason: CLARIF

## 2018-11-30 DIAGNOSIS — F90.9 ATTENTION DEFICIT HYPERACTIVITY DISORDER (ADHD), UNSPECIFIED ADHD TYPE: ICD-10-CM

## 2018-11-30 RX ORDER — DEXTROAMPHETAMINE SACCHARATE, AMPHETAMINE ASPARTATE MONOHYDRATE, DEXTROAMPHETAMINE SULFATE AND AMPHETAMINE SULFATE 5; 5; 5; 5 MG/1; MG/1; MG/1; MG/1
20 CAPSULE, EXTENDED RELEASE ORAL DAILY
Qty: 30 CAPSULE | Refills: 0 | Status: SHIPPED | OUTPATIENT
Start: 2018-11-30 | End: 2019-03-06 | Stop reason: SDUPTHER

## 2018-11-30 NOTE — TELEPHONE ENCOUNTER
Pt called in stating medication was to go to [facility] in [facility] - it was sent to [facility] [address] - I called the pharmacy and cancelled the one at [address] - please resend to [facility] in [facility] ty    PDMP re-checked  Last fill: 10/2  Quant: 30    Prescriptions   Filled  ID  Written  Drug  QTY  Days  Prescriber  Rx #  Pharmacy *  Refills  Daily Dose  Pymt Type      10/02/2018  1  10/02/2018  DEXTROAMP-AMPHET ER 20 MG CAP  30 0  30  [facility]  [id number]  [facility] ([id number]) 1   [place]

## 2019-02-12 DIAGNOSIS — Z30.44 ENCOUNTER FOR SURVEILLANCE OF VAGINAL RING HORMONAL CONTRACEPTIVE DEVICE: ICD-10-CM

## 2019-02-12 RX ORDER — ETONOGESTREL AND ETHINYL ESTRADIOL 11.7; 2.7 MG/1; MG/1
1 INSERT, EXTENDED RELEASE VAGINAL
Qty: 3 EACH | Refills: 2 | Status: SHIPPED | OUTPATIENT
Start: 2019-02-12 | End: 2019-09-09 | Stop reason: SDUPTHER

## 2019-03-06 DIAGNOSIS — F90.9 ATTENTION DEFICIT HYPERACTIVITY DISORDER (ADHD), UNSPECIFIED ADHD TYPE: ICD-10-CM

## 2019-03-07 RX ORDER — DEXTROAMPHETAMINE SACCHARATE, AMPHETAMINE ASPARTATE MONOHYDRATE, DEXTROAMPHETAMINE SULFATE AND AMPHETAMINE SULFATE 5; 5; 5; 5 MG/1; MG/1; MG/1; MG/1
20 CAPSULE, EXTENDED RELEASE ORAL DAILY
Qty: 30 CAPSULE | Refills: 0 | Status: SHIPPED | OUTPATIENT
Start: 2019-03-07 | End: 2019-09-03 | Stop reason: SDUPTHER

## 2019-09-03 DIAGNOSIS — F90.9 ATTENTION DEFICIT HYPERACTIVITY DISORDER (ADHD), UNSPECIFIED ADHD TYPE: ICD-10-CM

## 2019-09-03 RX ORDER — DEXTROAMPHETAMINE SACCHARATE, AMPHETAMINE ASPARTATE MONOHYDRATE, DEXTROAMPHETAMINE SULFATE AND AMPHETAMINE SULFATE 5; 5; 5; 5 MG/1; MG/1; MG/1; MG/1
20 CAPSULE, EXTENDED RELEASE ORAL DAILY
Qty: 30 CAPSULE | Refills: 0 | Status: SHIPPED | OUTPATIENT
Start: 2019-09-03 | End: 2020-02-14 | Stop reason: SDUPTHER

## 2019-09-03 NOTE — TELEPHONE ENCOUNTER
The patient moved out of the area a year ago  She is unsure if she will be moving back to the [employer]  The patient said she would call back to schedule an appointment

## 2019-09-03 NOTE — TELEPHONE ENCOUNTER
PDMP CHECKED  LAST FILL: 3/8  QUANT: 30      Prescriptions   Filled  ID  Written  Drug  QTY  Days  Prescriber  Rx #  Pharmacy *  Refills  Daily Dose  Pymt Type      05/03/2019  1  05/03/2019  OXYCODONE-ACETAMINOPHEN 5-325  30 0  8  [facility]  [id number]  [facility]- ([id number])  0  28 13 MME  [place]    03/08/2019  2  03/07/2019  DEXTROAMP-AMPHET ER 20 MG CAP  30 0  30  [place]  [id number]  [facility] ([id number])  0   [place]

## 2019-09-04 NOTE — TELEPHONE ENCOUNTER
Needs to find a new PCP then closer to home  She needs to be seen at least once a year with me prescribing any medication for her

## 2019-09-05 NOTE — TELEPHONE ENCOUNTER
Patient advised    I left a message for her to call back to schedule an appointment or let us know if she has a new PCP

## 2019-09-09 DIAGNOSIS — Z30.44 ENCOUNTER FOR SURVEILLANCE OF VAGINAL RING HORMONAL CONTRACEPTIVE DEVICE: ICD-10-CM

## 2019-09-09 RX ORDER — ETONOGESTREL AND ETHINYL ESTRADIOL 11.7; 2.7 MG/1; MG/1
1 INSERT, EXTENDED RELEASE VAGINAL
Qty: 3 EACH | Refills: 0 | Status: SHIPPED | OUTPATIENT
Start: 2019-09-09 | End: 2019-12-18 | Stop reason: SDUPTHER

## 2019-12-18 ENCOUNTER — TELEPHONE (OUTPATIENT)
Dept: GYNECOLOGY | Facility: CLINIC | Age: 29
End: 2019-12-18

## 2019-12-18 DIAGNOSIS — Z30.44 ENCOUNTER FOR SURVEILLANCE OF VAGINAL RING HORMONAL CONTRACEPTIVE DEVICE: ICD-10-CM

## 2019-12-18 NOTE — TELEPHONE ENCOUNTER
Patient called and needs refill of Nuvaring sent to [facility] on [address] [address]  Phone number for pharmacy is [phone]

## 2019-12-23 RX ORDER — ETONOGESTREL AND ETHINYL ESTRADIOL 11.7; 2.7 MG/1; MG/1
1 INSERT, EXTENDED RELEASE VAGINAL
Qty: 3 EACH | Refills: 0 | Status: SHIPPED | OUTPATIENT
Start: 2019-12-23 | End: 2020-04-08 | Stop reason: SDUPTHER

## 2019-12-24 ENCOUNTER — TELEPHONE (OUTPATIENT)
Dept: GYNECOLOGY | Facility: CLINIC | Age: 29
End: 2019-12-24

## 2019-12-24 NOTE — TELEPHONE ENCOUNTER
----- Message from Abigail Hall, [address] sent at 12/23/2019  7:41 AM EST -----  Regarding: needs appointment  I sent in a refill but she is overdue for an appointment

## 2019-12-24 NOTE — TELEPHONE ENCOUNTER
Called patient, left message, per Rosalina, that refill was sent in but patient needs to contact the office to set up an appointment

## 2019-12-27 ENCOUNTER — TELEPHONE (OUTPATIENT)
Dept: GYNECOLOGY | Facility: CLINIC | Age: 29
End: 2019-12-27

## 2020-01-02 ENCOUNTER — DOCUMENTATION (OUTPATIENT)
Dept: GYNECOLOGY | Facility: CLINIC | Age: 30
End: 2020-01-02

## 2020-01-02 RX ORDER — FLUCONAZOLE 150 MG/1
150 TABLET ORAL
Qty: 2 TABLET | Refills: 0 | OUTPATIENT
Start: 2020-01-02 | End: 2020-01-06

## 2020-01-02 NOTE — PROGRESS NOTES
Left message for pt no meds will be called in today will be examined first then will be treated after appt damaris

## 2020-01-03 ENCOUNTER — ANNUAL EXAM (OUTPATIENT)
Dept: GYNECOLOGY | Facility: CLINIC | Age: 30
End: 2020-01-03
Payer: COMMERCIAL

## 2020-01-03 VITALS
HEIGHT: 64 IN | WEIGHT: 144 LBS | HEART RATE: 100 BPM | DIASTOLIC BLOOD PRESSURE: 84 MMHG | SYSTOLIC BLOOD PRESSURE: 118 MMHG | BODY MASS INDEX: 24.59 KG/M2

## 2020-01-03 DIAGNOSIS — Z12.4 ENCOUNTER FOR PAPANICOLAOU SMEAR FOR CERVICAL CANCER SCREENING: Primary | ICD-10-CM

## 2020-01-03 DIAGNOSIS — B37.3 MONILIAL VULVOVAGINITIS: ICD-10-CM

## 2020-01-03 DIAGNOSIS — N89.8 VAGINAL DISCHARGE: ICD-10-CM

## 2020-01-03 PROCEDURE — 87210 SMEAR WET MOUNT SALINE/INK: CPT | Performed by: OBSTETRICS & GYNECOLOGY

## 2020-01-03 PROCEDURE — 99395 PREV VISIT EST AGE 18-39: CPT | Performed by: OBSTETRICS & GYNECOLOGY

## 2020-01-03 PROCEDURE — G0145 SCR C/V CYTO,THINLAYER,RESCR: HCPCS | Performed by: OBSTETRICS & GYNECOLOGY

## 2020-01-03 RX ORDER — FLUCONAZOLE 150 MG/1
150 TABLET ORAL ONCE
Qty: 2 TABLET | Refills: 0 | Status: SHIPPED | OUTPATIENT
Start: 2020-01-03 | End: 2020-01-03

## 2020-01-03 NOTE — PROGRESS NOTES
Assessment/Plan:    Discussed treatment with OTC monistat 7, patient prefers Diflucan, will use on Day 1 and 4  The patient likes Nuva Ring and would like to restart  Will wait until menses which is due in a few days  Will take PT if no menses  Recommended monthly SBE and annual CBE  ASCCP guidelines reviewed and pap collected today  The patient declines STI testing at this time  She is aware that condoms are recommended with all sexual contact for STI prevention  Return to the office in one year for routine annual gyn exam or sooner PRN  Diagnoses and all orders for this visit:    Encounter for Papanicolaou smear for cervical cancer screening  -     Liquid-based pap, screening    Monilial vulvovaginitis  -     fluconazole (DIFLUCAN) 150 mg tablet; Take 1 tablet (150 mg total) by mouth once for 1 dose    Vaginal discharge  -     POCT wet mount        Subjective:      Patient ID: Aida Eli is a 34 y o  female  This patient presents for routine annual gyn exam    She reports she took a bath using a bath bomb and started with vaginal itching and discharge 2 days ago  Has not used any OTC treatments  She had been using Nuva Ring but did not have one for last month so she has been using condoms only for 1 month  She does have rings at home, but is waiting for menses to start  LMP 12/4/19  Menses are light and regular  She denies breast concerns, she has B/L breast implants  She denies pelvic pain, bowel/bladder dysfunction, depression/anxiety  Two partners this year, declines STI testing  The following portions of the patient's history were reviewed and updated as appropriate: allergies, current medications, past family history, past medical history, past social history, past surgical history and problem list     Review of Systems   Constitutional: Negative  HENT: Negative  Respiratory: Negative  Cardiovascular: Negative  Gastrointestinal: Negative  Endocrine: Negative  Genitourinary: Positive for vaginal discharge  Negative for difficulty urinating, dyspareunia, dysuria, frequency, menstrual problem, pelvic pain, urgency, vaginal bleeding and vaginal pain  Musculoskeletal: Negative  Skin: Negative  Neurological: Negative  Psychiatric/Behavioral: Negative  Objective:      /84   Pulse 100   Ht 5' 3 5" (1 613 m)   Wt 65 3 kg (144 lb)   LMP 12/04/2019   BMI 25 11 kg/m²          Physical Exam   Constitutional: She is oriented to person, place, and time  She appears well-developed and well-nourished  She is cooperative  HENT:   Head: Normocephalic and atraumatic  Neck: Normal range of motion  Neck supple  No thyroid mass and no thyromegaly present  Cardiovascular: Normal rate, regular rhythm and normal heart sounds  Pulmonary/Chest: Effort normal and breath sounds normal  Right breast exhibits no inverted nipple, no mass, no nipple discharge, no skin change and no tenderness  Left breast exhibits no inverted nipple, no mass, no nipple discharge, no skin change and no tenderness  No breast tenderness or discharge  Breasts are symmetrical    B/L breast implants noted   Abdominal: Soft  Normal appearance and bowel sounds are normal  There is no hepatosplenomegaly  There is no tenderness  Hernia confirmed negative in the right inguinal area and confirmed negative in the left inguinal area  Genitourinary: Uterus normal  Rectal exam shows no external hemorrhoid  No breast tenderness or discharge  Pelvic exam was performed with patient supine  No labial fusion  There is rash (erythema) on the right labia  There is no tenderness, lesion or injury on the right labia  There is rash (erythema) on the left labia  There is no tenderness, lesion or injury on the left labia  Uterus is not deviated, not enlarged, not fixed and not tender  Cervix exhibits no motion tenderness, no discharge and no friability   Right adnexum displays no mass, no tenderness and no fullness  Left adnexum displays no mass, no tenderness and no fullness  There is erythema in the vagina  No tenderness or bleeding in the vagina  No signs of injury around the vagina  Vaginal discharge (copious amount of thick white discharge) found  Genitourinary Comments: Urethra normal without lesions  No bladder tenderness   Musculoskeletal: Normal range of motion  Lymphadenopathy:     She has no axillary adenopathy  No inguinal adenopathy noted on the right or left side  Right: No inguinal adenopathy present  Left: No inguinal adenopathy present  Neurological: She is alert and oriented to person, place, and time  Skin: Skin is warm, dry and intact  Psychiatric: She has a normal mood and affect  Her speech is normal and behavior is normal  Cognition and memory are normal    Nursing note and vitals reviewed

## 2020-01-03 NOTE — PATIENT INSTRUCTIONS
Discussed treatment with OTC monistat 7, patient prefers Diflucan, will use on Day 1 and 4  The patient likes Nuva Ring and would like to restart  Will wait until menses which is due in a few days  Will take PT if no menses  Recommended monthly SBE and annual CBE  ASCCP guidelines reviewed and pap collected today  The patient declines STI testing at this time  She is aware that condoms are recommended with all sexual contact for STI prevention  Return to the office in one year for routine annual gyn exam or sooner PRN

## 2020-01-07 LAB
LAB AP GYN PRIMARY INTERPRETATION: NORMAL
Lab: NORMAL

## 2020-01-30 ENCOUNTER — TELEPHONE (OUTPATIENT)
Dept: OTHER | Facility: OTHER | Age: 30
End: 2020-01-30

## 2020-01-30 DIAGNOSIS — N30.00 ACUTE CYSTITIS WITHOUT HEMATURIA: Primary | ICD-10-CM

## 2020-01-30 RX ORDER — SULFAMETHOXAZOLE AND TRIMETHOPRIM 800; 160 MG/1; MG/1
1 TABLET ORAL EVERY 12 HOURS SCHEDULED
Qty: 10 TABLET | Refills: 0 | Status: SHIPPED | OUTPATIENT
Start: 2020-01-30 | End: 2020-02-04

## 2020-01-30 NOTE — TELEPHONE ENCOUNTER
Pt is requesting a call from Pooja Brown MD   or a possible RX for a UTI that she has had for approximately a week

## 2020-01-30 NOTE — TELEPHONE ENCOUNTER
Patient has symptoms for about a week  She is having slight burning, urgency but can't go  Patient is using over the counter azo but it is not helping  Patient is unable to come in for an appointment  She will not be home until after 5:30 today  Asking if you can send an antibiotic to the pharmacy    Pharmacy-[facility]    Please advise

## 2020-01-30 NOTE — TELEPHONE ENCOUNTER
I will send Miracle Cuadra  Schedule wellness in the spring if she has [employer]   Schedule any time if she has any other insurance

## 2020-01-30 NOTE — PROGRESS NOTES
Patient called with burning, pressure with urination for 1 week  She has been using AZO without improvement in sx  Bactrim DS bid for 5 days sent to pharmacy  She was advised to be seen if sx persist or worsen

## 2020-02-06 DIAGNOSIS — N39.0 URINARY TRACT INFECTION WITHOUT HEMATURIA, SITE UNSPECIFIED: Primary | ICD-10-CM

## 2020-02-06 NOTE — PROGRESS NOTES
Pt just finished Bactrim on Tuesday and feel no change in symptoms  advissed her to make appt she works til 4 in [employer] to [employer] need to be seen or take U/A to the lab  Or be seen at PCP or Urgent care,   She would like us to send order to the lab for now and go from there    If needed we can see her next week

## 2020-02-07 ENCOUNTER — DOCUMENTATION (OUTPATIENT)
Dept: GYNECOLOGY | Facility: CLINIC | Age: 30
End: 2020-02-07

## 2020-02-07 LAB
APPEARANCE UR: CLEAR
BACTERIA URNS QL MICRO: ABNORMAL
BILIRUB UR QL STRIP: NEGATIVE
COLOR UR: YELLOW
CRYSTALS URNS MICRO: ABNORMAL
EPI CELLS #/AREA URNS HPF: ABNORMAL /HPF (ref 0–10)
GLUCOSE UR QL: NEGATIVE
HGB UR QL STRIP: NEGATIVE
KETONES UR QL STRIP: NEGATIVE
LEUKOCYTE ESTERASE UR QL STRIP: NEGATIVE
MICRO URNS: ABNORMAL
MICRO URNS: ABNORMAL
MUCOUS THREADS URNS QL MICRO: PRESENT
NITRITE UR QL STRIP: NEGATIVE
PH UR STRIP: 6 [PH] (ref 5–7.5)
PROT UR QL STRIP: NEGATIVE
RBC #/AREA URNS HPF: ABNORMAL /HPF (ref 0–2)
SL AMB URINALYSIS REFLEX: ABNORMAL
SP GR UR: >=1.03 (ref 1–1.03)
UNIDENT CRYS URNS QL MICRO: PRESENT
UROBILINOGEN UR STRIP-ACNC: 0.2 MG/DL (ref 0.2–1)
WBC #/AREA URNS HPF: ABNORMAL /HPF (ref 0–5)

## 2020-02-07 NOTE — PROGRESS NOTES
Pt called regarding U/A no sign of infection but did show some crystals she is having some discomfort advised her to see PCP for follow up and if pain gets worse need to go to ER     No sign of infection on urine tests

## 2020-02-09 ENCOUNTER — HOSPITAL ENCOUNTER (EMERGENCY)
Facility: HOSPITAL | Age: 30
Discharge: HOME | End: 2020-02-09
Attending: EMERGENCY MEDICINE | Admitting: EMERGENCY MEDICINE
Payer: COMMERCIAL

## 2020-02-09 VITALS
DIASTOLIC BLOOD PRESSURE: 60 MMHG | SYSTOLIC BLOOD PRESSURE: 130 MMHG | TEMPERATURE: 98 F | RESPIRATION RATE: 16 BRPM | HEART RATE: 75 BPM | WEIGHT: 151.24 LBS | OXYGEN SATURATION: 99 % | BODY MASS INDEX: 26.37 KG/M2

## 2020-02-09 DIAGNOSIS — R30.0 DYSURIA: Primary | ICD-10-CM

## 2020-02-09 LAB
ANION GAP SERPL CALCULATED.3IONS-SCNC: 11 MMOL/L (ref 4–13)
BACTERIA UR QL AUTO: ABNORMAL /HPF
BASOPHILS # BLD AUTO: 0.04 THOUSANDS/ΜL (ref 0–0.1)
BASOPHILS NFR BLD AUTO: 1 % (ref 0–1)
BILIRUB UR QL STRIP: NEGATIVE
BUN SERPL-MCNC: 8 MG/DL (ref 5–25)
CALCIUM SERPL-MCNC: 9.1 MG/DL (ref 8.3–10.1)
CHLORIDE SERPL-SCNC: 106 MMOL/L (ref 100–108)
CLARITY UR: CLEAR
CO2 SERPL-SCNC: 23 MMOL/L (ref 21–32)
COLOR UR: YELLOW
CREAT SERPL-MCNC: 0.54 MG/DL (ref 0.6–1.3)
EOSINOPHIL # BLD AUTO: 0.21 THOUSAND/ΜL (ref 0–0.61)
EOSINOPHIL NFR BLD AUTO: 3 % (ref 0–6)
ERYTHROCYTE [DISTWIDTH] IN BLOOD BY AUTOMATED COUNT: 12.3 % (ref 11.6–15.1)
GFR SERPL CREATININE-BSD FRML MDRD: 128 ML/MIN/1.73SQ M
GLUCOSE SERPL-MCNC: 94 MG/DL (ref 65–140)
GLUCOSE UR STRIP-MCNC: NEGATIVE MG/DL
HCG SERPL QL: NEGATIVE
HCT VFR BLD AUTO: 36.7 % (ref 34.8–46.1)
HGB BLD-MCNC: 12.5 G/DL (ref 11.5–15.4)
HGB UR QL STRIP.AUTO: ABNORMAL
IMM GRANULOCYTES # BLD AUTO: 0.01 THOUSAND/UL (ref 0–0.2)
IMM GRANULOCYTES NFR BLD AUTO: 0 % (ref 0–2)
KETONES UR STRIP-MCNC: NEGATIVE MG/DL
LEUKOCYTE ESTERASE UR QL STRIP: NEGATIVE
LYMPHOCYTES # BLD AUTO: 1.55 THOUSANDS/ΜL (ref 0.6–4.47)
LYMPHOCYTES NFR BLD AUTO: 22 % (ref 14–44)
MCH RBC QN AUTO: 30.1 PG (ref 26.8–34.3)
MCHC RBC AUTO-ENTMCNC: 34.1 G/DL (ref 31.4–37.4)
MCV RBC AUTO: 88 FL (ref 82–98)
MONOCYTES # BLD AUTO: 0.52 THOUSAND/ΜL (ref 0.17–1.22)
MONOCYTES NFR BLD AUTO: 7 % (ref 4–12)
NEUTROPHILS # BLD AUTO: 4.7 THOUSANDS/ΜL (ref 1.85–7.62)
NEUTS SEG NFR BLD AUTO: 67 % (ref 43–75)
NITRITE UR QL STRIP: NEGATIVE
NON-SQ EPI CELLS URNS QL MICRO: ABNORMAL /HPF
NRBC BLD AUTO-RTO: 0 /100 WBCS
PH UR STRIP.AUTO: 6.5 [PH] (ref 4.5–8)
PLATELET # BLD AUTO: 304 THOUSANDS/UL (ref 149–390)
PMV BLD AUTO: 9.8 FL (ref 8.9–12.7)
POTASSIUM SERPL-SCNC: 3.8 MMOL/L (ref 3.5–5.3)
PROT UR STRIP-MCNC: NEGATIVE MG/DL
RBC # BLD AUTO: 4.15 MILLION/UL (ref 3.81–5.12)
RBC #/AREA URNS AUTO: ABNORMAL /HPF
SODIUM SERPL-SCNC: 140 MMOL/L (ref 136–145)
SP GR UR STRIP.AUTO: 1.02 (ref 1–1.03)
URATE SERPL-MCNC: 2.4 MG/DL (ref 2–6.8)
UROBILINOGEN UR QL STRIP.AUTO: 0.2 E.U./DL
WBC # BLD AUTO: 7.03 THOUSAND/UL (ref 4.31–10.16)
WBC #/AREA URNS AUTO: ABNORMAL /HPF

## 2020-02-09 PROCEDURE — 87591 N.GONORRHOEAE DNA AMP PROB: CPT | Performed by: PHYSICIAN ASSISTANT

## 2020-02-09 PROCEDURE — 36415 COLL VENOUS BLD VENIPUNCTURE: CPT | Performed by: PHYSICIAN ASSISTANT

## 2020-02-09 PROCEDURE — 85025 COMPLETE CBC W/AUTO DIFF WBC: CPT | Performed by: PHYSICIAN ASSISTANT

## 2020-02-09 PROCEDURE — 80048 BASIC METABOLIC PNL TOTAL CA: CPT | Performed by: PHYSICIAN ASSISTANT

## 2020-02-09 PROCEDURE — 81001 URINALYSIS AUTO W/SCOPE: CPT

## 2020-02-09 PROCEDURE — 99284 EMERGENCY DEPT VISIT MOD MDM: CPT

## 2020-02-09 PROCEDURE — 84703 CHORIONIC GONADOTROPIN ASSAY: CPT | Performed by: PHYSICIAN ASSISTANT

## 2020-02-09 PROCEDURE — 87491 CHLMYD TRACH DNA AMP PROBE: CPT | Performed by: PHYSICIAN ASSISTANT

## 2020-02-09 PROCEDURE — 99284 EMERGENCY DEPT VISIT MOD MDM: CPT | Performed by: PHYSICIAN ASSISTANT

## 2020-02-09 PROCEDURE — 84550 ASSAY OF BLOOD/URIC ACID: CPT | Performed by: PHYSICIAN ASSISTANT

## 2020-02-09 NOTE — DISCHARGE INSTRUCTIONS
How do you lower my chances of calcium oxalate crystals in your urine  Drink plenty of water per day  Avoid eating too much protein  Eat less salt  Included rate amount of calcium in your diet  Avoid vitamin-C supplements  Eat less oxalate rich foods

## 2020-02-09 NOTE — ED NOTES
Family at bedside  Pt denies trauma recently  Denies fever as well       Yunior Johnson, RN  02/09/20 [address], RN  02/09/20 [id number]
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
## 2020-02-10 LAB
C TRACH DNA SPEC QL NAA+PROBE: NEGATIVE
N GONORRHOEA DNA SPEC QL NAA+PROBE: NEGATIVE

## 2020-02-14 ENCOUNTER — TELEPHONE (OUTPATIENT)
Dept: INTERNAL MEDICINE CLINIC | Facility: CLINIC | Age: 30
End: 2020-02-14

## 2020-02-14 ENCOUNTER — OFFICE VISIT (OUTPATIENT)
Dept: INTERNAL MEDICINE CLINIC | Facility: CLINIC | Age: 30
End: 2020-02-14
Payer: COMMERCIAL

## 2020-02-14 ENCOUNTER — HOSPITAL ENCOUNTER (OUTPATIENT)
Dept: RADIOLOGY | Facility: HOSPITAL | Age: 30
Discharge: HOME | End: 2020-02-14
Payer: COMMERCIAL

## 2020-02-14 VITALS
DIASTOLIC BLOOD PRESSURE: 64 MMHG | TEMPERATURE: 100 F | BODY MASS INDEX: 24.31 KG/M2 | OXYGEN SATURATION: 100 % | HEART RATE: 86 BPM | SYSTOLIC BLOOD PRESSURE: 122 MMHG | HEIGHT: 64 IN | WEIGHT: 142.4 LBS

## 2020-02-14 DIAGNOSIS — F90.9 ATTENTION DEFICIT HYPERACTIVITY DISORDER (ADHD), UNSPECIFIED ADHD TYPE: ICD-10-CM

## 2020-02-14 DIAGNOSIS — Z79.899 ENCOUNTER FOR LONG-TERM (CURRENT) USE OF OTHER MEDICATIONS: ICD-10-CM

## 2020-02-14 DIAGNOSIS — R31.29 MICROSCOPIC HEMATURIA: ICD-10-CM

## 2020-02-14 DIAGNOSIS — R39.89 SENSATION OF PRESSURE IN BLADDER AREA: Primary | ICD-10-CM

## 2020-02-14 DIAGNOSIS — R39.89 SENSATION OF PRESSURE IN BLADDER AREA: ICD-10-CM

## 2020-02-14 LAB
SL AMB  POCT GLUCOSE, UA: NEGATIVE
SL AMB LEUKOCYTE ESTERASE,UA: NEGATIVE
SL AMB POCT BILIRUBIN,UA: NEGATIVE
SL AMB POCT BLOOD,UA: ABNORMAL
SL AMB POCT CLARITY,UA: CLEAR
SL AMB POCT COLOR,UA: YELLOW
SL AMB POCT KETONES,UA: ABNORMAL
SL AMB POCT NITRITE,UA: NEGATIVE
SL AMB POCT PH,UA: 6
SL AMB POCT SPECIFIC GRAVITY,UA: 1.02
SL AMB POCT URINE PROTEIN: NEGATIVE
SL AMB POCT UROBILINOGEN: NEGATIVE

## 2020-02-14 PROCEDURE — 99214 OFFICE O/P EST MOD 30 MIN: CPT | Performed by: INTERNAL MEDICINE

## 2020-02-14 PROCEDURE — 81003 URINALYSIS AUTO W/O SCOPE: CPT | Performed by: INTERNAL MEDICINE

## 2020-02-14 PROCEDURE — 1036F TOBACCO NON-USER: CPT | Performed by: INTERNAL MEDICINE

## 2020-02-14 PROCEDURE — 74018 RADEX ABDOMEN 1 VIEW: CPT

## 2020-02-14 RX ORDER — DEXTROAMPHETAMINE SACCHARATE, AMPHETAMINE ASPARTATE MONOHYDRATE, DEXTROAMPHETAMINE SULFATE AND AMPHETAMINE SULFATE 5; 5; 5; 5 MG/1; MG/1; MG/1; MG/1
20 CAPSULE, EXTENDED RELEASE ORAL DAILY
Qty: 30 CAPSULE | Refills: 0 | Status: SHIPPED | OUTPATIENT
Start: 2020-02-14 | End: 2020-05-18 | Stop reason: SDUPTHER
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
## 2020-02-14 NOTE — TELEPHONE ENCOUNTER
Xray can be done today (as it does not need to be scheduled) being it is the last day of her insurance  The US is not a stat test for her symptoms so I will not order it today

## 2020-02-18 LAB
6MAM UR QL: NEGATIVE NG/ML
AMPHETAMINES UR QL: NEGATIVE NG/ML
BARBITURATES UR QL: NEGATIVE NG/ML
BENZODIAZ UR QL: NEGATIVE NG/ML
BENZODIAZ UR SCN-MCNC: NORMAL NG/ML
BZE UR QL: NEGATIVE NG/ML
CREAT UR-MCNC: 125.8 MG/DL
ETHANOL UR QL: NEGATIVE NG/ML
MEDICATION PRESCRIBED: NORMAL
METHADONE UR QL: NEGATIVE NG/ML
OPIATES UR QL: NEGATIVE NG/ML
OXIDANTS UR QL: NEGATIVE MCG/ML
OXYCODONE UR QL: NEGATIVE NG/ML
PCP UR QL: NEGATIVE NG/ML
PH UR: 6.3 [PH] (ref 4.5–9)
SL AMB MEDMATCH 6 ACETYLMORPHINE: NORMAL
SL AMB MEDMATCH ALCOHOL METAB: NORMAL
SL AMB MEDMATCH AMPTHETAMINES: NORMAL
SL AMB MEDMATCH BARBITUATES: NORMAL
SL AMB MEDMATCH COCAINE METABOLITE: NORMAL
SL AMB MEDMATCH MARIJUANA METABOLITE: NORMAL
SL AMB MEDMATCH METHADONE METABOLITE: NORMAL
SL AMB MEDMATCH OPIATES: NORMAL
SL AMB MEDMATCH OXYCODONE: NORMAL
SL AMB MEDMATCH PHENCYCLIDINE: NORMAL
THC UR QL: NEGATIVE NG/ML

## 2020-02-20 ENCOUNTER — TELEPHONE (OUTPATIENT)
Dept: INTERNAL MEDICINE CLINIC | Facility: CLINIC | Age: 30
End: 2020-02-20

## 2020-02-20 NOTE — TELEPHONE ENCOUNTER
Danny Hood from radiology called to let you know there are significant findings on the patient's abdominal x-ray

## 2020-02-26 ENCOUNTER — TELEPHONE (OUTPATIENT)
Dept: INTERNAL MEDICINE CLINIC | Facility: CLINIC | Age: 30
End: 2020-02-26

## 2020-02-27 DIAGNOSIS — R31.29 MICROSCOPIC HEMATURIA: ICD-10-CM

## 2020-02-27 DIAGNOSIS — R39.89 SENSATION OF PRESSURE IN BLADDER AREA: Primary | ICD-10-CM

## 2020-02-27 NOTE — TELEPHONE ENCOUNTER
Spoke with patient that her XR shows phleboliths in the pelvis-uncertain clinical significance and a left distal ureteral calculus is not completely excluded  Unfortunately, she does not have insurance at this time, not until April 1  She has mild bladder urgency and pelvic pressure    She will get a CT once she has insurance and f/u with urology  Order entered

## 2020-04-07 ENCOUNTER — TELEPHONE (OUTPATIENT)
Dept: GYNECOLOGY | Facility: CLINIC | Age: 30
End: 2020-04-07

## 2020-04-08 ENCOUNTER — TELEPHONE (OUTPATIENT)
Dept: GYNECOLOGY | Facility: CLINIC | Age: 30
End: 2020-04-08

## 2020-04-08 DIAGNOSIS — Z30.44 ENCOUNTER FOR SURVEILLANCE OF VAGINAL RING HORMONAL CONTRACEPTIVE DEVICE: ICD-10-CM

## 2020-04-08 RX ORDER — ETONOGESTREL AND ETHINYL ESTRADIOL 11.7; 2.7 MG/1; MG/1
1 INSERT, EXTENDED RELEASE VAGINAL
Qty: 3 EACH | Refills: 3 | Status: SHIPPED | OUTPATIENT
Start: 2020-04-08 | End: 2020-05-13 | Stop reason: SDUPTHER

## 2020-04-22 ENCOUNTER — TELEPHONE (OUTPATIENT)
Dept: UROLOGY | Facility: MEDICAL CENTER | Age: 30
End: 2020-04-22

## 2020-04-28 ENCOUNTER — TELEMEDICINE (OUTPATIENT)
Dept: UROLOGY | Facility: AMBULATORY SURGERY CENTER | Age: 30
End: 2020-04-28
Payer: COMMERCIAL

## 2020-04-28 DIAGNOSIS — N32.81 OAB (OVERACTIVE BLADDER): Primary | ICD-10-CM

## 2020-04-28 PROCEDURE — 99205 OFFICE O/P NEW HI 60 MIN: CPT | Performed by: UROLOGY

## 2020-04-28 RX ORDER — OXYBUTYNIN CHLORIDE 10 MG/1
10 TABLET, EXTENDED RELEASE ORAL DAILY
Qty: 90 TABLET | Refills: 3 | Status: SHIPPED | OUTPATIENT
Start: 2020-04-28 | End: 2020-06-24 | Stop reason: ALTCHOICE

## 2020-05-08 ENCOUNTER — HOSPITAL ENCOUNTER (OUTPATIENT)
Dept: CT IMAGING | Facility: HOSPITAL | Age: 30
Discharge: HOME | End: 2020-05-08
Payer: COMMERCIAL

## 2020-05-08 DIAGNOSIS — R39.89 SENSATION OF PRESSURE IN BLADDER AREA: ICD-10-CM

## 2020-05-08 DIAGNOSIS — R31.29 MICROSCOPIC HEMATURIA: ICD-10-CM

## 2020-05-08 PROCEDURE — 74176 CT ABD & PELVIS W/O CONTRAST: CPT

## 2020-05-12 ENCOUNTER — TELEPHONE (OUTPATIENT)
Dept: GYNECOLOGY | Facility: CLINIC | Age: 30
End: 2020-05-12

## 2020-05-13 DIAGNOSIS — Z30.44 ENCOUNTER FOR SURVEILLANCE OF VAGINAL RING HORMONAL CONTRACEPTIVE DEVICE: ICD-10-CM

## 2020-05-13 RX ORDER — ETONOGESTREL AND ETHINYL ESTRADIOL 11.7; 2.7 MG/1; MG/1
1 INSERT, EXTENDED RELEASE VAGINAL
Qty: 3 EACH | Refills: 3 | Status: SHIPPED | OUTPATIENT
Start: 2020-05-13 | End: 2021-04-09

## 2020-05-18 DIAGNOSIS — F90.9 ATTENTION DEFICIT HYPERACTIVITY DISORDER (ADHD), UNSPECIFIED ADHD TYPE: ICD-10-CM

## 2020-05-18 RX ORDER — DEXTROAMPHETAMINE SACCHARATE, AMPHETAMINE ASPARTATE MONOHYDRATE, DEXTROAMPHETAMINE SULFATE AND AMPHETAMINE SULFATE 5; 5; 5; 5 MG/1; MG/1; MG/1; MG/1
20 CAPSULE, EXTENDED RELEASE ORAL DAILY
Qty: 30 CAPSULE | Refills: 0 | Status: SHIPPED | OUTPATIENT
Start: 2020-05-18 | End: 2020-07-06 | Stop reason: SDUPTHER

## 2020-06-23 ENCOUNTER — TELEPHONE (OUTPATIENT)
Dept: UROLOGY | Facility: AMBULATORY SURGERY CENTER | Age: 30
End: 2020-06-23

## 2020-06-24 ENCOUNTER — TELEMEDICINE (OUTPATIENT)
Dept: INTERNAL MEDICINE CLINIC | Facility: CLINIC | Age: 30
End: 2020-06-24
Payer: COMMERCIAL

## 2020-06-24 VITALS
HEART RATE: 72 BPM | HEIGHT: 64 IN | WEIGHT: 136 LBS | DIASTOLIC BLOOD PRESSURE: 86 MMHG | BODY MASS INDEX: 23.22 KG/M2 | SYSTOLIC BLOOD PRESSURE: 132 MMHG

## 2020-06-24 DIAGNOSIS — R05.9 COUGH: Primary | ICD-10-CM

## 2020-06-24 DIAGNOSIS — J30.2 SEASONAL ALLERGIES: ICD-10-CM

## 2020-06-24 PROCEDURE — 99213 OFFICE O/P EST LOW 20 MIN: CPT | Performed by: NURSE PRACTITIONER

## 2020-06-24 PROCEDURE — 3008F BODY MASS INDEX DOCD: CPT | Performed by: NURSE PRACTITIONER

## 2020-06-24 RX ORDER — BENZONATATE 100 MG/1
100 CAPSULE ORAL 3 TIMES DAILY PRN
Qty: 20 CAPSULE | Refills: 0 | Status: SHIPPED | OUTPATIENT
Start: 2020-06-24 | End: 2021-01-18 | Stop reason: ALTCHOICE

## 2020-06-24 RX ORDER — KETOROLAC TROMETHAMINE 10 MG/1
10 TABLET, FILM COATED ORAL
COMMUNITY
Start: 2019-05-03 | End: 2020-06-24 | Stop reason: ALTCHOICE

## 2020-06-28 PROBLEM — R05.9 COUGH: Status: ACTIVE | Noted: 2020-06-28

## 2020-06-28 PROBLEM — J30.2 SEASONAL ALLERGIES: Status: ACTIVE | Noted: 2020-06-28

## 2020-07-06 DIAGNOSIS — F90.9 ATTENTION DEFICIT HYPERACTIVITY DISORDER (ADHD), UNSPECIFIED ADHD TYPE: ICD-10-CM

## 2020-07-06 RX ORDER — DEXTROAMPHETAMINE SACCHARATE, AMPHETAMINE ASPARTATE MONOHYDRATE, DEXTROAMPHETAMINE SULFATE AND AMPHETAMINE SULFATE 5; 5; 5; 5 MG/1; MG/1; MG/1; MG/1
20 CAPSULE, EXTENDED RELEASE ORAL DAILY
Qty: 30 CAPSULE | Refills: 0 | Status: SHIPPED | OUTPATIENT
Start: 2020-07-06 | End: 2020-08-28 | Stop reason: SDUPTHER

## 2020-07-27 DIAGNOSIS — Z72.51 HIGH RISK SEXUAL BEHAVIOR, UNSPECIFIED TYPE: Primary | ICD-10-CM

## 2020-07-27 NOTE — PROGRESS NOTES
Pt called she wants to get tested for G/Chlamydia  Had unprotected sex and now is concerned has no symptom she is just paranoid    patrick lives near [address] [place] [address] will go there and give urine sample

## 2020-08-28 DIAGNOSIS — F90.9 ATTENTION DEFICIT HYPERACTIVITY DISORDER (ADHD), UNSPECIFIED ADHD TYPE: ICD-10-CM

## 2020-08-28 RX ORDER — DEXTROAMPHETAMINE SACCHARATE, AMPHETAMINE ASPARTATE MONOHYDRATE, DEXTROAMPHETAMINE SULFATE AND AMPHETAMINE SULFATE 5; 5; 5; 5 MG/1; MG/1; MG/1; MG/1
20 CAPSULE, EXTENDED RELEASE ORAL DAILY
Qty: 30 CAPSULE | Refills: 0 | Status: SHIPPED | OUTPATIENT
Start: 2020-08-28 | End: 2020-11-03 | Stop reason: SDUPTHER

## 2020-09-17 ENCOUNTER — DOCUMENTATION (OUTPATIENT)
Dept: GYNECOLOGY | Facility: CLINIC | Age: 30
End: 2020-09-17

## 2020-09-17 DIAGNOSIS — B37.3 MONILIAL VULVOVAGINITIS: Primary | ICD-10-CM

## 2020-09-17 RX ORDER — FLUCONAZOLE 150 MG/1
150 TABLET ORAL
Qty: 2 TABLET | Refills: 0 | Status: SHIPPED | OUTPATIENT
Start: 2020-09-17 | End: 2020-09-21

## 2020-09-17 NOTE — PROGRESS NOTES
Pt called she has a yeast infection vaginal itching, d/c and home test was positive  She has her period now and would like treatment for this she cannot use the creams  Spoke to Dr Paul Mcclellanocent he will give her Fluconazole

## 2020-09-18 ENCOUNTER — APPOINTMENT (OUTPATIENT)
Dept: LAB | Facility: MEDICAL CENTER | Age: 30
End: 2020-09-18
Payer: COMMERCIAL

## 2020-09-18 DIAGNOSIS — Z72.51 HIGH RISK SEXUAL BEHAVIOR, UNSPECIFIED TYPE: ICD-10-CM

## 2020-09-18 PROCEDURE — 87591 N.GONORRHOEAE DNA AMP PROB: CPT

## 2020-09-18 PROCEDURE — 87491 CHLMYD TRACH DNA AMP PROBE: CPT

## 2020-09-21 LAB
C TRACH DNA SPEC QL NAA+PROBE: NEGATIVE
N GONORRHOEA DNA SPEC QL NAA+PROBE: NEGATIVE

## 2020-11-02 ENCOUNTER — TELEMEDICINE (OUTPATIENT)
Dept: INTERNAL MEDICINE CLINIC | Facility: CLINIC | Age: 30
End: 2020-11-02
Payer: COMMERCIAL

## 2020-11-02 ENCOUNTER — TELEPHONE (OUTPATIENT)
Dept: INTERNAL MEDICINE CLINIC | Facility: CLINIC | Age: 30
End: 2020-11-02

## 2020-11-02 DIAGNOSIS — Z20.828 EXPOSURE TO SARS-ASSOCIATED CORONAVIRUS: Primary | ICD-10-CM

## 2020-11-02 DIAGNOSIS — Z20.828 EXPOSURE TO SARS-ASSOCIATED CORONAVIRUS: ICD-10-CM

## 2020-11-02 PROCEDURE — U0003 INFECTIOUS AGENT DETECTION BY NUCLEIC ACID (DNA OR RNA); SEVERE ACUTE RESPIRATORY SYNDROME CORONAVIRUS 2 (SARS-COV-2) (CORONAVIRUS DISEASE [COVID-19]), AMPLIFIED PROBE TECHNIQUE, MAKING USE OF HIGH THROUGHPUT TECHNOLOGIES AS DESCRIBED BY CMS-2020-01-R: HCPCS | Performed by: INTERNAL MEDICINE

## 2020-11-02 PROCEDURE — 99214 OFFICE O/P EST MOD 30 MIN: CPT | Performed by: INTERNAL MEDICINE

## 2020-11-03 DIAGNOSIS — F90.9 ATTENTION DEFICIT HYPERACTIVITY DISORDER (ADHD), UNSPECIFIED ADHD TYPE: ICD-10-CM

## 2020-11-03 LAB — SARS-COV-2 RNA SPEC QL NAA+PROBE: NOT DETECTED

## 2020-11-04 RX ORDER — DEXTROAMPHETAMINE SACCHARATE, AMPHETAMINE ASPARTATE MONOHYDRATE, DEXTROAMPHETAMINE SULFATE AND AMPHETAMINE SULFATE 5; 5; 5; 5 MG/1; MG/1; MG/1; MG/1
20 CAPSULE, EXTENDED RELEASE ORAL DAILY
Qty: 30 CAPSULE | Refills: 0 | Status: SHIPPED | OUTPATIENT
Start: 2020-11-04 | End: 2020-12-22 | Stop reason: SDUPTHER

## 2020-11-12 ENCOUNTER — TELEMEDICINE (OUTPATIENT)
Dept: INTERNAL MEDICINE CLINIC | Facility: CLINIC | Age: 30
End: 2020-11-12
Payer: COMMERCIAL

## 2020-11-12 ENCOUNTER — TELEPHONE (OUTPATIENT)
Dept: INTERNAL MEDICINE CLINIC | Facility: CLINIC | Age: 30
End: 2020-11-12

## 2020-11-12 DIAGNOSIS — B34.9 VIRAL INFECTION, UNSPECIFIED: Primary | ICD-10-CM

## 2020-11-12 DIAGNOSIS — B34.9 VIRAL INFECTION, UNSPECIFIED: ICD-10-CM

## 2020-11-12 PROCEDURE — U0003 INFECTIOUS AGENT DETECTION BY NUCLEIC ACID (DNA OR RNA); SEVERE ACUTE RESPIRATORY SYNDROME CORONAVIRUS 2 (SARS-COV-2) (CORONAVIRUS DISEASE [COVID-19]), AMPLIFIED PROBE TECHNIQUE, MAKING USE OF HIGH THROUGHPUT TECHNOLOGIES AS DESCRIBED BY CMS-2020-01-R: HCPCS | Performed by: INTERNAL MEDICINE

## 2020-11-12 PROCEDURE — 99214 OFFICE O/P EST MOD 30 MIN: CPT | Performed by: INTERNAL MEDICINE

## 2020-11-13 LAB — SARS-COV-2 RNA SPEC QL NAA+PROBE: DETECTED

## 2020-11-16 ENCOUNTER — TELEMEDICINE (OUTPATIENT)
Dept: INTERNAL MEDICINE CLINIC | Facility: CLINIC | Age: 30
End: 2020-11-16
Payer: COMMERCIAL

## 2020-11-16 DIAGNOSIS — U07.1 COVID-19 VIRUS INFECTION: Primary | ICD-10-CM

## 2020-11-16 PROBLEM — R05.9 COUGH: Status: RESOLVED | Noted: 2020-06-28 | Resolved: 2020-11-16

## 2020-11-16 PROCEDURE — 99213 OFFICE O/P EST LOW 20 MIN: CPT | Performed by: INTERNAL MEDICINE

## 2020-11-20 ENCOUNTER — TELEMEDICINE (OUTPATIENT)
Dept: INTERNAL MEDICINE CLINIC | Facility: CLINIC | Age: 30
End: 2020-11-20
Payer: COMMERCIAL

## 2020-11-20 DIAGNOSIS — U07.1 COVID-19 VIRUS INFECTION: Primary | ICD-10-CM

## 2020-11-20 PROCEDURE — 1036F TOBACCO NON-USER: CPT | Performed by: INTERNAL MEDICINE

## 2020-11-20 PROCEDURE — 99212 OFFICE O/P EST SF 10 MIN: CPT | Performed by: INTERNAL MEDICINE

## 2020-12-07 ENCOUNTER — LAB (OUTPATIENT)
Dept: LAB | Facility: MEDICAL CENTER | Age: 30
End: 2020-12-07
Payer: COMMERCIAL

## 2020-12-07 ENCOUNTER — TRANSCRIBE ORDERS (OUTPATIENT)
Dept: ADMINISTRATIVE | Facility: HOSPITAL | Age: 30
End: 2020-12-07

## 2020-12-07 ENCOUNTER — CONSULT (OUTPATIENT)
Dept: INTERNAL MEDICINE CLINIC | Facility: CLINIC | Age: 30
End: 2020-12-07
Payer: COMMERCIAL

## 2020-12-07 VITALS
DIASTOLIC BLOOD PRESSURE: 75 MMHG | SYSTOLIC BLOOD PRESSURE: 120 MMHG | HEIGHT: 64 IN | WEIGHT: 141 LBS | TEMPERATURE: 97.3 F | OXYGEN SATURATION: 98 % | BODY MASS INDEX: 24.07 KG/M2 | HEART RATE: 87 BPM

## 2020-12-07 DIAGNOSIS — Z79.899 ENCOUNTER FOR LONG-TERM (CURRENT) USE OF OTHER MEDICATIONS: ICD-10-CM

## 2020-12-07 DIAGNOSIS — Z01.818 OTHER SPECIFIED PRE-OPERATIVE EXAMINATION: ICD-10-CM

## 2020-12-07 DIAGNOSIS — Z01.818 PREOP EXAMINATION: Primary | ICD-10-CM

## 2020-12-07 DIAGNOSIS — Z79.899 ENCOUNTER FOR LONG-TERM (CURRENT) USE OF OTHER MEDICATIONS: Primary | ICD-10-CM

## 2020-12-07 DIAGNOSIS — Z41.1 ENCOUNTER FOR BREAST AUGMENTATION: ICD-10-CM

## 2020-12-07 LAB
BASOPHILS # BLD AUTO: 0.05 THOUSANDS/ΜL (ref 0–0.1)
BASOPHILS NFR BLD AUTO: 1 % (ref 0–1)
EOSINOPHIL # BLD AUTO: 0.31 THOUSAND/ΜL (ref 0–0.61)
EOSINOPHIL NFR BLD AUTO: 5 % (ref 0–6)
ERYTHROCYTE [DISTWIDTH] IN BLOOD BY AUTOMATED COUNT: 12.5 % (ref 11.6–15.1)
HCT VFR BLD AUTO: 39.2 % (ref 34.8–46.1)
HGB BLD-MCNC: 12.8 G/DL (ref 11.5–15.4)
IMM GRANULOCYTES # BLD AUTO: 0.02 THOUSAND/UL (ref 0–0.2)
IMM GRANULOCYTES NFR BLD AUTO: 0 % (ref 0–2)
LYMPHOCYTES # BLD AUTO: 2.06 THOUSANDS/ΜL (ref 0.6–4.47)
LYMPHOCYTES NFR BLD AUTO: 30 % (ref 14–44)
MCH RBC QN AUTO: 29.4 PG (ref 26.8–34.3)
MCHC RBC AUTO-ENTMCNC: 32.7 G/DL (ref 31.4–37.4)
MCV RBC AUTO: 90 FL (ref 82–98)
MONOCYTES # BLD AUTO: 0.52 THOUSAND/ΜL (ref 0.17–1.22)
MONOCYTES NFR BLD AUTO: 8 % (ref 4–12)
NEUTROPHILS # BLD AUTO: 3.92 THOUSANDS/ΜL (ref 1.85–7.62)
NEUTS SEG NFR BLD AUTO: 56 % (ref 43–75)
NRBC BLD AUTO-RTO: 0 /100 WBCS
PLATELET # BLD AUTO: 291 THOUSANDS/UL (ref 149–390)
PMV BLD AUTO: 10.2 FL (ref 8.9–12.7)
RBC # BLD AUTO: 4.36 MILLION/UL (ref 3.81–5.12)
WBC # BLD AUTO: 6.88 THOUSAND/UL (ref 4.31–10.16)

## 2020-12-07 PROCEDURE — 36415 COLL VENOUS BLD VENIPUNCTURE: CPT

## 2020-12-07 PROCEDURE — 99215 OFFICE O/P EST HI 40 MIN: CPT | Performed by: GENERAL ACUTE CARE HOSPITAL

## 2020-12-07 PROCEDURE — 85025 COMPLETE CBC W/AUTO DIFF WBC: CPT

## 2020-12-07 PROCEDURE — 3008F BODY MASS INDEX DOCD: CPT | Performed by: GENERAL ACUTE CARE HOSPITAL

## 2020-12-07 PROCEDURE — 1036F TOBACCO NON-USER: CPT | Performed by: GENERAL ACUTE CARE HOSPITAL

## 2020-12-22 DIAGNOSIS — F90.9 ATTENTION DEFICIT HYPERACTIVITY DISORDER (ADHD), UNSPECIFIED ADHD TYPE: ICD-10-CM

## 2020-12-22 RX ORDER — DEXTROAMPHETAMINE SACCHARATE, AMPHETAMINE ASPARTATE MONOHYDRATE, DEXTROAMPHETAMINE SULFATE AND AMPHETAMINE SULFATE 5; 5; 5; 5 MG/1; MG/1; MG/1; MG/1
20 CAPSULE, EXTENDED RELEASE ORAL DAILY
Qty: 30 CAPSULE | Refills: 0 | Status: SHIPPED | OUTPATIENT
Start: 2020-12-22 | End: 2021-01-29 | Stop reason: SDUPTHER

## 2020-12-23 ENCOUNTER — OFFICE VISIT (OUTPATIENT)
Dept: GYNECOLOGY | Facility: CLINIC | Age: 30
End: 2020-12-23
Payer: COMMERCIAL

## 2020-12-23 VITALS
BODY MASS INDEX: 23.73 KG/M2 | SYSTOLIC BLOOD PRESSURE: 132 MMHG | HEIGHT: 64 IN | WEIGHT: 139 LBS | DIASTOLIC BLOOD PRESSURE: 82 MMHG | HEART RATE: 100 BPM

## 2020-12-23 DIAGNOSIS — Z72.51 HIGH RISK HETEROSEXUAL BEHAVIOR: ICD-10-CM

## 2020-12-23 DIAGNOSIS — N93.0 PCB (POST COITAL BLEEDING): Primary | ICD-10-CM

## 2020-12-23 PROCEDURE — 1036F TOBACCO NON-USER: CPT | Performed by: OBSTETRICS & GYNECOLOGY

## 2020-12-23 PROCEDURE — 99213 OFFICE O/P EST LOW 20 MIN: CPT | Performed by: OBSTETRICS & GYNECOLOGY

## 2020-12-23 PROCEDURE — 3008F BODY MASS INDEX DOCD: CPT | Performed by: OBSTETRICS & GYNECOLOGY

## 2020-12-25 LAB
C TRACH RRNA SPEC QL NAA+PROBE: NOT DETECTED
N GONORRHOEA RRNA SPEC QL NAA+PROBE: NOT DETECTED
T VAGINALIS RRNA SPEC QL NAA+PROBE: NOT DETECTED

## 2020-12-31 ENCOUNTER — TELEPHONE (OUTPATIENT)
Dept: INTERNAL MEDICINE CLINIC | Facility: CLINIC | Age: 30
End: 2020-12-31

## 2021-01-04 DIAGNOSIS — Z11.9 ENCOUNTER FOR SCREENING FOR INFECTIOUS AND PARASITIC DISEASES, UNSPECIFIED: Primary | ICD-10-CM

## 2021-01-04 DIAGNOSIS — Z11.9 ENCOUNTER FOR SCREENING FOR INFECTIOUS AND PARASITIC DISEASES, UNSPECIFIED: ICD-10-CM

## 2021-01-04 PROCEDURE — U0003 INFECTIOUS AGENT DETECTION BY NUCLEIC ACID (DNA OR RNA); SEVERE ACUTE RESPIRATORY SYNDROME CORONAVIRUS 2 (SARS-COV-2) (CORONAVIRUS DISEASE [COVID-19]), AMPLIFIED PROBE TECHNIQUE, MAKING USE OF HIGH THROUGHPUT TECHNOLOGIES AS DESCRIBED BY CMS-2020-01-R: HCPCS | Performed by: INTERNAL MEDICINE

## 2021-01-05 LAB — SARS-COV-2 RNA SPEC QL NAA+PROBE: NOT DETECTED

## 2021-01-15 NOTE — PROGRESS NOTES
Assessment/Plan:    Normal findings on routine gyn exam  The patient likes current contraceptive method and desires to continue [employer]  Denies need for refill  Recommended monthly SBE and annual CBE  ASCCP guidelines reviewed and pap with HPV collected today  The patient declines STI testing at this time  She is aware that condoms are recommended with all sexual contact for STI prevention  Return to the office in one year for routine annual gyn exam or sooner PRN  Diagnoses and all orders for this visit:    Encounter for gynecological examination (general) (routine) without abnormal findings        Subjective:      Patient ID: Hamida Mclaughlin is a 27 y o  female  This patient presents for routine annual gyn exam    She was last seen 12/23/20 for PCB and was noted to have friable cervix, neg GC/CT  Today she reports bleeding has stopped and she does not wish to cauterize cervix  She has a hx of B/L breast implants  Denies breast concerns  Menses are light and regular on Nuva Ring  She denies abn discharge, pelvic pain, bowel/bladder dysfunction, depression/anxiety  Likes current contraception and desires to continue  The following portions of the patient's history were reviewed and updated as appropriate: allergies, current medications, past family history, past medical history, past social history, past surgical history and problem list     Review of Systems   Constitutional: Negative  HENT: Negative  Respiratory: Negative  Cardiovascular: Negative  Gastrointestinal: Negative  Endocrine: Negative  Genitourinary: Negative for difficulty urinating, dyspareunia, dysuria, frequency, menstrual problem, pelvic pain, urgency, vaginal bleeding, vaginal discharge and vaginal pain  Musculoskeletal: Negative  Skin: Negative  Neurological: Negative  Psychiatric/Behavioral: Negative            Objective:      /74 (BP Location: Left arm, Patient Position: Sitting, Cuff Size: Standard)   Pulse (!) 108   Ht 5' 3 5" (1 613 m)   Wt 67 1 kg (148 lb)   LMP 12/31/2020   BMI 25 81 kg/m²          Physical Exam  Vitals signs and nursing note reviewed  Exam conducted with a chaperone present  Constitutional:       Appearance: Normal appearance  She is well-developed  HENT:      Head: Normocephalic and atraumatic  Neck:      Musculoskeletal: Normal range of motion and neck supple  Thyroid: No thyroid mass or thyromegaly  Cardiovascular:      Rate and Rhythm: Normal rate and regular rhythm  Heart sounds: Normal heart sounds  Pulmonary:      Effort: Pulmonary effort is normal       Breath sounds: Normal breath sounds  Chest:      Breasts: Breasts are symmetrical          Right: No inverted nipple, mass, nipple discharge, skin change or tenderness  Left: No inverted nipple, mass, nipple discharge, skin change or tenderness  Comments: B/L breast implants noted  Abdominal:      General: Bowel sounds are normal       Palpations: Abdomen is soft  Tenderness: There is no abdominal tenderness  Hernia: There is no hernia in the left inguinal area or right inguinal area  Genitourinary:     General: Normal vulva  Exam position: Supine  Pubic Area: No rash  Labia:         Right: No rash, tenderness, lesion or injury  Left: No rash, tenderness, lesion or injury  Urethra: No prolapse, urethral pain, urethral swelling or urethral lesion  Vagina: Normal  No signs of injury and foreign body  No vaginal discharge, erythema, tenderness, bleeding, lesions or prolapsed vaginal walls  Cervix: No cervical motion tenderness, discharge, friability, lesion, erythema, cervical bleeding or eversion  Uterus: Not deviated, not enlarged, not fixed, not tender and no uterine prolapse  Adnexa:         Right: No mass, tenderness or fullness  Left: No mass, tenderness or fullness          Rectum: No external hemorrhoid  Comments: Urethra normal without lesions  No bladder tenderness  No friability noted on cervix today  Musculoskeletal: Normal range of motion  Lymphadenopathy:      Lower Body: No right inguinal adenopathy  No left inguinal adenopathy  Skin:     General: Skin is warm and dry  Neurological:      Mental Status: She is alert and oriented to person, place, and time  Psychiatric:         Speech: Speech normal          Behavior: Behavior normal  Behavior is cooperative

## 2021-01-18 ENCOUNTER — ANNUAL EXAM (OUTPATIENT)
Dept: GYNECOLOGY | Facility: CLINIC | Age: 31
End: 2021-01-18
Payer: COMMERCIAL

## 2021-01-18 VITALS
SYSTOLIC BLOOD PRESSURE: 120 MMHG | DIASTOLIC BLOOD PRESSURE: 74 MMHG | HEIGHT: 64 IN | HEART RATE: 108 BPM | BODY MASS INDEX: 25.27 KG/M2 | WEIGHT: 148 LBS

## 2021-01-18 DIAGNOSIS — Z01.419 ENCOUNTER FOR GYNECOLOGICAL EXAMINATION (GENERAL) (ROUTINE) WITHOUT ABNORMAL FINDINGS: Primary | ICD-10-CM

## 2021-01-18 DIAGNOSIS — Z01.419 ENCOUNTER FOR GYNECOLOGICAL EXAMINATION WITH PAPANICOLAOU SMEAR OF CERVIX: ICD-10-CM

## 2021-01-18 PROCEDURE — 99395 PREV VISIT EST AGE 18-39: CPT | Performed by: OBSTETRICS & GYNECOLOGY

## 2021-01-18 PROCEDURE — 3008F BODY MASS INDEX DOCD: CPT | Performed by: OBSTETRICS & GYNECOLOGY

## 2021-01-18 PROCEDURE — 1036F TOBACCO NON-USER: CPT | Performed by: OBSTETRICS & GYNECOLOGY

## 2021-01-21 LAB
CLINICAL INFO: ABNORMAL
CYTO CVX: ABNORMAL
CYTOLOGY CMNT CVX/VAG CYTO-IMP: ABNORMAL
DATE PREVIOUS BX: ABNORMAL
GEN CATEG CVX/VAG CYTO-IMP: ABNORMAL
HPV E6+E7 MRNA CVX QL NAA+PROBE: DETECTED
LMP START DATE: ABNORMAL
SL AMB PREV. PAP:: ABNORMAL
SPECIMEN SOURCE CVX/VAG CYTO: ABNORMAL

## 2021-01-25 ENCOUNTER — DOCUMENTATION (OUTPATIENT)
Dept: GYNECOLOGY | Facility: CLINIC | Age: 31
End: 2021-01-25

## 2021-01-25 DIAGNOSIS — N89.8 VAGINAL DISCHARGE: Primary | ICD-10-CM

## 2021-01-25 RX ORDER — CLINDAMYCIN PHOSPHATE 20 MG/G
1 CREAM VAGINAL
Qty: 40 G | Refills: 0 | OUTPATIENT
Start: 2021-01-25 | End: 2021-02-08 | Stop reason: ALTCHOICE

## 2021-01-26 DIAGNOSIS — B37.3 MONILIAL VULVOVAGINITIS: Primary | ICD-10-CM

## 2021-01-26 RX ORDER — FLUCONAZOLE 150 MG/1
TABLET ORAL
Qty: 2 TABLET | Refills: 0 | Status: SHIPPED | OUTPATIENT
Start: 2021-01-26 | End: 2021-01-29

## 2021-01-29 DIAGNOSIS — F90.9 ATTENTION DEFICIT HYPERACTIVITY DISORDER (ADHD), UNSPECIFIED ADHD TYPE: ICD-10-CM

## 2021-01-29 RX ORDER — DEXTROAMPHETAMINE SACCHARATE, AMPHETAMINE ASPARTATE MONOHYDRATE, DEXTROAMPHETAMINE SULFATE AND AMPHETAMINE SULFATE 5; 5; 5; 5 MG/1; MG/1; MG/1; MG/1
20 CAPSULE, EXTENDED RELEASE ORAL DAILY
Qty: 30 CAPSULE | Refills: 0 | Status: SHIPPED | OUTPATIENT
Start: 2021-01-29 | End: 2021-03-05 | Stop reason: SDUPTHER

## 2021-01-29 NOTE — TELEPHONE ENCOUNTER
Pt called she did not get any relief from Fluconazole but is convinced  It is yeast  Will send over t-7 per Dr Geraldo Baker     [facility]  Rx and notified pt

## 2021-02-08 ENCOUNTER — PROCEDURE VISIT (OUTPATIENT)
Dept: GYNECOLOGY | Facility: CLINIC | Age: 31
End: 2021-02-08
Payer: COMMERCIAL

## 2021-02-08 VITALS
SYSTOLIC BLOOD PRESSURE: 112 MMHG | HEIGHT: 64 IN | BODY MASS INDEX: 24.82 KG/M2 | WEIGHT: 145.4 LBS | DIASTOLIC BLOOD PRESSURE: 68 MMHG

## 2021-02-08 DIAGNOSIS — R87.612 LGSIL ON PAP SMEAR OF CERVIX: ICD-10-CM

## 2021-02-08 PROCEDURE — 57454 BX/CURETT OF CERVIX W/SCOPE: CPT | Performed by: OBSTETRICS & GYNECOLOGY

## 2021-02-08 NOTE — PROGRESS NOTES
Colposcopy    Date/Time: 2/8/2021 5:12 PM  Performed by: CARLEY Britt  Authorized by: CARLEY Britt     Consent:     Consent obtained:  Verbal and written    Consent given by:  Patient    Procedural risks discussed:  Bleeding, damage to other organs, failure rate, infection, possible continued pain and repeat procedure    Patient questions answered: yes      Patient agrees, verbalizes understanding, and wants to proceed: yes      Educational handouts given: yes      Instructions and paperwork completed: yes    Pre-procedure:     Pre-procedure timeout performed: yes      Premeds:  Ibuprofen  Indication:     Indication:  LSIL (+HPV)  Procedure:     Procedure: Colposcopy w/ cervical biopsy and ECC      Under satisfactory analgesia the patient was prepped and draped in the dorsal lithotomy position: yes      Onyx speculum was placed in the vagina: yes      Endocervix was curetted using a Kevorkian curette: yes      Cervical biopsy performed with a cervical biopsy punch: yes      Biopsy(s): yes      Location:  5 and 11 oclock    Specimen to pathology: yes    Post-procedure:     Findings: Bleeding      Impression: Low grade cervical dysplasia      Patient tolerance of procedure:   Tolerated well, no immediate complications

## 2021-02-11 LAB
PATH REPORT.FINAL DX SPEC: NORMAL
PROCEDURE TYPE: NORMAL
SPECIMEN SOURCE: NORMAL

## 2021-02-12 LAB
PATH REPORT.FINAL DX SPEC: NORMAL
PROCEDURE TYPE: NORMAL
PROCEDURE TYPE: NORMAL
SPECIMEN SOURCE: NORMAL
SPECIMEN SOURCE: NORMAL

## 2021-02-15 ENCOUNTER — TELEPHONE (OUTPATIENT)
Dept: GYNECOLOGY | Facility: CLINIC | Age: 31
End: 2021-02-15

## 2021-02-15 NOTE — TELEPHONE ENCOUNTER
LM on VM to [facility] - need to discuss colpo results  She needs a LEEP

## 2021-02-18 ENCOUNTER — TELEPHONE (OUTPATIENT)
Dept: GYNECOLOGY | Facility: CLINIC | Age: 31
End: 2021-02-18

## 2021-02-22 ENCOUNTER — OFFICE VISIT (OUTPATIENT)
Dept: GYNECOLOGY | Facility: CLINIC | Age: 31
End: 2021-02-22
Payer: COMMERCIAL

## 2021-02-22 VITALS
BODY MASS INDEX: 24.75 KG/M2 | SYSTOLIC BLOOD PRESSURE: 114 MMHG | DIASTOLIC BLOOD PRESSURE: 70 MMHG | WEIGHT: 145 LBS | HEIGHT: 64 IN

## 2021-02-22 DIAGNOSIS — N87.1 HIGH GRADE SQUAMOUS INTRAEPITHELIAL LESION (HGSIL), GRADE 2 CIN, ON BIOPSY OF CERVIX: Primary | ICD-10-CM

## 2021-02-22 DIAGNOSIS — Z11.3 ROUTINE SCREENING FOR STI (SEXUALLY TRANSMITTED INFECTION): ICD-10-CM

## 2021-02-22 DIAGNOSIS — Z72.51 HIGH RISK HETEROSEXUAL BEHAVIOR: ICD-10-CM

## 2021-02-22 PROCEDURE — 3008F BODY MASS INDEX DOCD: CPT | Performed by: INTERNAL MEDICINE

## 2021-02-22 PROCEDURE — 99213 OFFICE O/P EST LOW 20 MIN: CPT | Performed by: OBSTETRICS & GYNECOLOGY

## 2021-02-23 NOTE — PROGRESS NOTES
Assessment/Plan:    Patient scheduled for LEEP with Dr Alcides Brownlee  Procedure explained  Risks/benefits reviewed  Questions answered  Patient could not schedule in March because of a trip to [place] and will be scheduled in April  Diagnoses and all orders for this visit:    High grade squamous intraepithelial lesion (HGSIL), grade 2 MONTY, on biopsy of cervix    Routine screening for STI (sexually transmitted infection)  -     Hepatitis B e antigen; Future  -     HIV 1/2 Antigen/Antibody (4th Generation) w Reflex [facility]; Future  -     RPR; Future  -     Chlamydia/GC amplified DNA by PCR; Future    High risk heterosexual behavior  -     Hepatitis B e antigen; Future  -     HIV 1/2 Antigen/Antibody (4th Generation) w Reflex [facility]; Future  -     RPR; Future  -     Chlamydia/GC amplified DNA by PCR; Future        Subjective:      Patient ID: Starleen Castleman is a 27 y o  female  Patient presents for 2 week post colposcopy check  Pap on 1/18/21 LSIL with +HPV, -16, -18  Colpo on 2/8/21 high grade lesion, MONTY 2-3  She reports 5 sexual partners in the last 12 months, no condom use  Patient did not have any concerns following colposcopy  The following portions of the patient's history were reviewed and updated as appropriate: allergies, current medications, past family history, past medical history, past social history, past surgical history and problem list     Review of Systems   Constitutional: Negative  HENT: Negative  Respiratory: Negative  Cardiovascular: Negative  Gastrointestinal: Negative  Endocrine: Negative  Genitourinary: Negative for difficulty urinating, dyspareunia, dysuria, frequency, menstrual problem, pelvic pain, urgency, vaginal bleeding, vaginal discharge and vaginal pain  Musculoskeletal: Negative  Skin: Negative  Neurological: Negative  Psychiatric/Behavioral: Negative            Objective:      /70 (BP Location: Left arm, Cuff Size: Standard)   Ht 5' 3 5" (1 613 m)   Wt 65 8 kg (145 lb)   LMP 01/26/2021   BMI 25 28 kg/m²          Physical Exam  Vitals signs and nursing note reviewed  Exam conducted with a chaperone present  Constitutional:       Appearance: Normal appearance  HENT:      Head: Normocephalic and atraumatic  Neck:      Musculoskeletal: Normal range of motion  Pulmonary:      Effort: Pulmonary effort is normal    Musculoskeletal: Normal range of motion  Skin:     General: Skin is warm and dry  Neurological:      Mental Status: She is alert and oriented to person, place, and time  Psychiatric:         Mood and Affect: Mood normal          Behavior: Behavior normal          Thought Content:  Thought content normal          Judgment: Judgment normal

## 2021-03-05 DIAGNOSIS — F90.9 ATTENTION DEFICIT HYPERACTIVITY DISORDER (ADHD), UNSPECIFIED ADHD TYPE: ICD-10-CM

## 2021-03-07 RX ORDER — DEXTROAMPHETAMINE SACCHARATE, AMPHETAMINE ASPARTATE MONOHYDRATE, DEXTROAMPHETAMINE SULFATE AND AMPHETAMINE SULFATE 5; 5; 5; 5 MG/1; MG/1; MG/1; MG/1
20 CAPSULE, EXTENDED RELEASE ORAL DAILY
Qty: 30 CAPSULE | Refills: 0 | Status: SHIPPED | OUTPATIENT
Start: 2021-03-07 | End: 2021-04-26 | Stop reason: SDUPTHER

## 2021-03-17 ENCOUNTER — DOCUMENTATION (OUTPATIENT)
Dept: GYNECOLOGY | Facility: CLINIC | Age: 31
End: 2021-03-17

## 2021-03-17 DIAGNOSIS — B37.9 YEAST INFECTION: Primary | ICD-10-CM

## 2021-03-17 RX ORDER — FLUCONAZOLE 150 MG/1
150 TABLET ORAL ONCE
Qty: 2 TABLET | Refills: 0 | Status: SHIPPED | OUTPATIENT
Start: 2021-03-17 | End: 2021-03-17

## 2021-03-17 NOTE — PROGRESS NOTES
Pt called  has thick white vaginal discharge, no itching, but statews it is an excessive amount, has not been on antibiotics did styart using a probiotic for "vaginal health"  Not sure if that is a factor but wanted you to know that      Please advise  [facility]ses [facility] [place]   [phone]

## 2021-03-17 NOTE — PROGRESS NOTES
Because she has an upcoming LEEP, I did send in Diflucan Day 1 and 4  If sx persist, she needs to be seen

## 2021-03-21 ENCOUNTER — OFFICE VISIT (OUTPATIENT)
Dept: URGENT CARE | Facility: MEDICAL CENTER | Age: 31
End: 2021-03-21
Payer: COMMERCIAL

## 2021-03-21 VITALS
RESPIRATION RATE: 16 BRPM | SYSTOLIC BLOOD PRESSURE: 120 MMHG | OXYGEN SATURATION: 99 % | TEMPERATURE: 98.5 F | HEART RATE: 98 BPM | DIASTOLIC BLOOD PRESSURE: 66 MMHG

## 2021-03-21 DIAGNOSIS — R39.9 UTI SYMPTOMS: Primary | ICD-10-CM

## 2021-03-21 DIAGNOSIS — R31.9 URINARY TRACT INFECTION WITH HEMATURIA, SITE UNSPECIFIED: ICD-10-CM

## 2021-03-21 DIAGNOSIS — N39.0 URINARY TRACT INFECTION WITH HEMATURIA, SITE UNSPECIFIED: ICD-10-CM

## 2021-03-21 LAB
SL AMB  POCT GLUCOSE, UA: ABNORMAL
SL AMB LEUKOCYTE ESTERASE,UA: ABNORMAL
SL AMB POCT BILIRUBIN,UA: ABNORMAL
SL AMB POCT BLOOD,UA: ABNORMAL
SL AMB POCT CLARITY,UA: ABNORMAL
SL AMB POCT COLOR,UA: ABNORMAL
SL AMB POCT KETONES,UA: ABNORMAL
SL AMB POCT NITRITE,UA: ABNORMAL
SL AMB POCT PH,UA: ABNORMAL
SL AMB POCT SPECIFIC GRAVITY,UA: ABNORMAL
SL AMB POCT URINE HCG: NEGATIVE
SL AMB POCT URINE PROTEIN: ABNORMAL
SL AMB POCT UROBILINOGEN: ABNORMAL

## 2021-03-21 PROCEDURE — 87086 URINE CULTURE/COLONY COUNT: CPT | Performed by: PHYSICIAN ASSISTANT

## 2021-03-21 PROCEDURE — G0382 LEV 3 HOSP TYPE B ED VISIT: HCPCS | Performed by: PHYSICIAN ASSISTANT

## 2021-03-21 PROCEDURE — 87491 CHLMYD TRACH DNA AMP PROBE: CPT | Performed by: PHYSICIAN ASSISTANT

## 2021-03-21 PROCEDURE — 87591 N.GONORRHOEAE DNA AMP PROB: CPT | Performed by: PHYSICIAN ASSISTANT

## 2021-03-21 PROCEDURE — 81025 URINE PREGNANCY TEST: CPT | Performed by: PHYSICIAN ASSISTANT

## 2021-03-21 PROCEDURE — 81002 URINALYSIS NONAUTO W/O SCOPE: CPT | Performed by: PHYSICIAN ASSISTANT

## 2021-03-21 RX ORDER — NITROFURANTOIN 25; 75 MG/1; MG/1
100 CAPSULE ORAL 2 TIMES DAILY
Qty: 7 CAPSULE | Refills: 0 | Status: SHIPPED | OUTPATIENT
Start: 2021-03-21 | End: 2021-09-22 | Stop reason: ALTCHOICE

## 2021-03-21 NOTE — PROGRESS NOTES
[facility]        NAME: Erica Martinez is a 27 y o  female  : 1990    MRN: [id number]  DATE: 2021  TIME: 11:13 AM    Assessment and Plan   UTI symptoms [R39 9]  1  UTI symptoms  POCT urine dip    Urine culture    Chlamydia/GC amplified DNA by PCR    POCT urine HCG     Patient was educated on high pulse  Patient was told I can not treat Chlamydia and Gonorrhea without a full pelvic exam and we do not have the proper tools here  Patient was told we can order the test because she is having burning with urination and that I can see her OB GYN had ordered it  Patient was told in great detail I recommend she follow up with OB GYN  Patient Instructions       Patient was educated on UTI  Patient was educated urine was sent for culture and pregnancy test was negative  Patient was told her testing for chlymadia and gonorrhea were sent and we will call with results  Patient was told she needs to follow up with OB GYN for full testing  Patient was educated she should have no intercourse while being tested for STI and being treated for UTI's  Patient understood  Chief Complaint     Chief Complaint   Patient presents with    Possible UTI     Patient presents with UTI symptoms that started on Tuesday  She reports burning on urination  History of Present Illness       Patient presents today with cramping, burning with urination and urinary frequency and urgency  Patient reports these symptoms started on 3/17/2021 and she has been taking AZO with no relief  Patient also reports her OB GYN had ordered chlamydia and gonorrhea testing and would like this today as well  Patient reports LMP was end of February and she would also like pregnancy test  Denies any shortness of breath, fevers or chest pain  [address] appointment first week of April  Review of Systems   Review of Systems   Constitutional: Negative  Respiratory: Negative  Cardiovascular: Negative  Gastrointestinal: Positive for abdominal pain  Genitourinary: Positive for dysuria, frequency, hematuria, pelvic pain and urgency  Neurological: Negative  Psychiatric/Behavioral: Negative  Current Medications       Current Outpatient Medications:     amphetamine-dextroamphetamine (ADDERALL XR) 20 MG 24 hr capsule, Take 1 capsule (20 mg total) by mouth dailyMax Daily Amount: 20 mg, Disp: 30 capsule, Rfl: 0    etonogestrel-ethinyl estradiol (NuvaRing) 0 12-0 015 MG/24HR vaginal ring, Insert 1 each into the vagina every 28 days, Disp: 3 each, Rfl: 3    Current Allergies     Allergies as of 03/21/2021 - Reviewed 03/21/2021   Allergen Reaction Noted    Hydromorphone Hives 05/03/2019    Levofloxacin Other (See Comments) 06/22/2016            The following portions of the patient's history were reviewed and updated as appropriate: allergies, current medications, past family history, past medical history, past social history, past surgical history and problem list      Past Medical History:   Diagnosis Date    ADHD (attention deficit hyperactivity disorder)     Dysmenorrhea     Last Assessed: 12/22/2015    Postcoital bleeding     Last Assessed: 2/19/2015     Vaginal pruritus     Resolved: 11/2/2017        Past Surgical History:   Procedure Laterality Date    AUGMENTATION BREAST      CERVICAL BIOPSY  W/ LOOP ELECTRODE EXCISION      CHOLECYSTECTOMY      COLPOSCOPY      FOOT SURGERY      right foot lis franc fracture    RECONSTRUCTION / CORRECTION OF NIPPLE / Normie Come      SURGERY OF LIP         Family History   Problem Relation Age of Onset    Esophageal cancer Father     Crohn's disease Mother     Diabetes Family     Cancer Family          Medications have been verified  Objective   /66   Pulse 98   Temp 98 5 °F (36 9 °C) (Tympanic)   Resp 16   LMP 02/24/2021   SpO2 99%   Patient's last menstrual period was 02/24/2021         Physical Exam     Physical Exam  Constitutional: Appearance: She is normal weight  HENT:      Head: Normocephalic  Cardiovascular:      Rate and Rhythm: Normal rate and regular rhythm  Heart sounds: Normal heart sounds  Pulmonary:      Breath sounds: Normal breath sounds  Abdominal:      Palpations: Abdomen is soft  Tenderness: There is abdominal tenderness  There is guarding  There is no right CVA tenderness or left CVA tenderness  Neurological:      Mental Status: She is alert and oriented to person, place, and time     Psychiatric:         Mood and Affect: Mood normal          Behavior: Behavior normal

## 2021-03-21 NOTE — PATIENT INSTRUCTIONS
Patient was educated on UTI  Patient was educated urine was sent for culture and pregancy test was negative  Patient was told her testing for chlymadia and gonorrhea were sent and we will call with results  Patient was told she needs to follow up with OB GYN for full testing  Patient was educated she should have no intercourse while being tested for STI and being treated for UTI's  Patient understood  Urinary Tract Infection in Women   WHAT YOU NEED TO KNOW:   A urinary tract infection (UTI) is caused by bacteria that get inside your urinary tract  Most bacteria that enter your urinary tract come out when you urinate  If the bacteria stay in your urinary tract, you may get an infection  Your urinary tract includes your kidneys, ureters, bladder, and urethra  Urine is made in your kidneys, and it flows from the ureters to the bladder  Urine leaves the bladder through the urethra  A UTI is more common in your lower urinary tract, which includes your bladder and urethra  DISCHARGE INSTRUCTIONS:   Return to the emergency department if:   · You are urinating very little or not at all  · You have a high fever with shaking chills  · You have side or back pain that gets worse  Call your doctor if:   · You have a fever  · You do not feel better after 2 days of taking antibiotics  · You are vomiting  · You have questions or concerns about your condition or care  Medicines:   · Antibiotics  help fight a bacterial infection  If you have UTIs often (called recurrent UTIs), you may be given antibiotics to take regularly  You will be given directions for when and how to use antibiotics  The goal is to prevent UTIs but not cause antibiotic resistance by using antibiotics too often  · Medicines  may be given to decrease pain and burning when you urinate  They will also help decrease the feeling that you need to urinate often  These medicines will make your urine orange or red      · Take your medicine as directed  Contact your healthcare provider if you think your medicine is not helping or if you have side effects  Tell him or her if you are allergic to any medicine  Keep a list of the medicines, vitamins, and herbs you take  Include the amounts, and when and why you take them  Bring the list or the pill bottles to follow-up visits  Carry your medicine list with you in case of an emergency  Follow up with your healthcare provider as directed:  Write down your questions so you remember to ask them during your visits  Prevent another UTI:   · Empty your bladder often  Urinate and empty your bladder as soon as you feel the need  Do not hold your urine for long periods of time  · Wipe from front to back after you urinate or have a bowel movement  This will help prevent germs from getting into your urinary tract through your urethra  · Drink liquids as directed  Ask how much liquid to drink each day and which liquids are best for you  You may need to drink more liquids than usual to help flush out the bacteria  Do not drink alcohol, caffeine, or citrus juices  These can irritate your bladder and increase your symptoms  Your healthcare provider may recommend cranberry juice to help prevent a UTI  · Urinate after you have sex  This can help flush out bacteria passed during sex  · Do not douche or use feminine deodorants  These can change the chemical balance in your vagina  · Change sanitary pads or tampons often  This will help prevent germs from getting into your urinary tract  · Talk to your healthcare provider about your birth control method  You may need to change your method if it is increasing your risk for UTIs  · Wear cotton underwear and clothes that are loose  Tight pants and nylon underwear can trap moisture and cause bacteria to grow  · Vaginal estrogen may be recommended    This medicine helps prevent UTIs in women who have gone through menopause or are in maryse-menopause  · Do pelvic muscle exercises often  Pelvic muscle exercises may help you start and stop urinating  Strong pelvic muscles may help you empty your bladder easier  Squeeze these muscles tightly for 5 seconds like you are trying to hold back urine  Then relax for 5 seconds  Gradually work up to squeezing for 10 seconds  Do 3 sets of 15 repetitions a day, or as directed  © Copyright [address] Information is for End User's use only and may not be sold, redistributed or otherwise used for commercial purposes  All illustrations and images included in CareNotes® are the copyrighted property of [employer]  or [address] [place] [address]  The above information is an  only  It is not intended as medical advice for individual conditions or treatments  Talk to your doctor, nurse or pharmacist before following any medical regimen to see if it is safe and effective for you

## 2021-03-22 LAB
BACTERIA UR CULT: NORMAL
C TRACH DNA SPEC QL NAA+PROBE: NEGATIVE
N GONORRHOEA DNA SPEC QL NAA+PROBE: NEGATIVE

## 2021-03-23 ENCOUNTER — TELEMEDICINE (OUTPATIENT)
Dept: INTERNAL MEDICINE CLINIC | Facility: CLINIC | Age: 31
End: 2021-03-23
Payer: COMMERCIAL

## 2021-03-23 VITALS — BODY MASS INDEX: 25.46 KG/M2 | WEIGHT: 146 LBS

## 2021-03-23 DIAGNOSIS — L29.0 ANAL PRURITUS: Primary | ICD-10-CM

## 2021-03-23 PROBLEM — Z01.818 PREOP EXAMINATION: Status: RESOLVED | Noted: 2020-12-07 | Resolved: 2021-03-23

## 2021-03-23 PROCEDURE — 1036F TOBACCO NON-USER: CPT | Performed by: INTERNAL MEDICINE

## 2021-03-23 PROCEDURE — 99213 OFFICE O/P EST LOW 20 MIN: CPT | Performed by: INTERNAL MEDICINE

## 2021-03-23 NOTE — PROGRESS NOTES
Virtual Regular Visit      Assessment/Plan:  She already took a dose of the OTC pyrantel pamoate although I do not believe this is a pinworm infection  She can try PTC Prep H for comfort  Problem List Items Addressed This Visit     None      Visit Diagnoses     Anal pruritus    -  Primary               Reason for visit is   Chief Complaint   Patient presents with    Follow-up    Virtual Regular Visit        Encounter provider Evan Macias MD    Provider located at [address] [place] [id number]      Recent Visits  No visits were found meeting these conditions  Showing recent visits within past 7 days and meeting all other requirements     Today's Visits  Date Type Provider Dept   03/23/21 Telemedicine Evan Macias MD [facility] today's visits and meeting all other requirements     Future Appointments  No visits were found meeting these conditions  Showing future appointments within next 150 days and meeting all other requirements        The patient was identified by name and date of birth  Donald Man was informed that this is a telemedicine visit and that the visit is being conducted through [facility] and patient was informed that this is a secure, HIPAA-compliant platform  She agrees to proceed     My office door was closed  No one else was in the room  She acknowledged consent and understanding of privacy and security of the video platform  The patient has agreed to participate and understands they can discontinue the visit at any time  Patient is aware this is a billable service  Subjective  Donald Man is a 27 y o  female with itcihng   HPI   3 months of intermittent itching in the anus  Worried about a worm  Saw a tiny bit of white discharge  She took OTC pinworm medication today (pyrantel pamoate)  She was in [place] recently  C/o UTI symptoms last week and went to urgent care    She is on Macrobid right now  Dysuria resolved  She still has some frequency  Past Medical History:   Diagnosis Date    ADHD (attention deficit hyperactivity disorder)     Dysmenorrhea     Last Assessed: 12/22/2015    Postcoital bleeding     Last Assessed: 2/19/2015     Preop examination 12/7/2020    Vaginal pruritus     Resolved: 11/2/2017        Past Surgical History:   Procedure Laterality Date    AUGMENTATION BREAST      CERVICAL BIOPSY  W/ LOOP ELECTRODE EXCISION      CHOLECYSTECTOMY      COLPOSCOPY      FOOT SURGERY      right foot lis franc fracture    RECONSTRUCTION / CORRECTION OF NIPPLE / AEROLA      SURGERY OF LIP         Current Outpatient Medications   Medication Sig Dispense Refill    amphetamine-dextroamphetamine (ADDERALL XR) 20 MG 24 hr capsule Take 1 capsule (20 mg total) by mouth dailyMax Daily Amount: 20 mg 30 capsule 0    nitrofurantoin (MACROBID) 100 mg capsule Take 1 capsule (100 mg total) by mouth 2 (two) times a day 7 capsule 0    etonogestrel-ethinyl estradiol (NuvaRing) 0 12-0 015 MG/24HR vaginal ring Insert 1 each into the vagina every 28 days 3 each 3     No current facility-administered medications for this visit  Allergies   Allergen Reactions    Hydromorphone Hives    Levofloxacin Other (See Comments)     Legs go numb       Review of Systems   Constitutional: Negative for chills and fever  Gastrointestinal: Negative for abdominal pain, anal bleeding, blood in stool, constipation, diarrhea, nausea and vomiting  Genitourinary: Positive for frequency  Negative for hematuria  Video Exam    Vitals:    03/23/21 0940   Weight: 66 2 kg (146 lb)       Physical Exam  Constitutional:       General: She is not in acute distress  Appearance: She is not ill-appearing, toxic-appearing or diaphoretic  Pulmonary:      Effort: No respiratory distress  Neurological:      Mental Status: She is oriented to person, place, and time     Psychiatric:         Mood and Affect: Mood normal  Behavior: Behavior normal          Thought Content: Thought content normal          Judgment: Judgment normal           I spent 15 minutes directly with the patient during this visit      VIRTUAL VISIT Vicky Queen acknowledges that she has consented to an online visit or consultation  She understands that the online visit is based solely on information provided by her, and that, in the absence of a face-to-face physical evaluation by the physician, the diagnosis she receives is both limited and provisional in terms of accuracy and completeness  This is not intended to replace a full medical face-to-face evaluation by the physician  Hamida Mclaughlin understands and accepts these terms

## 2021-03-24 ENCOUNTER — TELEPHONE (OUTPATIENT)
Dept: URGENT CARE | Facility: MEDICAL CENTER | Age: 31
End: 2021-03-24

## 2021-03-24 DIAGNOSIS — N39.0 URINARY TRACT INFECTION WITH HEMATURIA, SITE UNSPECIFIED: Primary | ICD-10-CM

## 2021-03-24 DIAGNOSIS — R31.9 URINARY TRACT INFECTION WITH HEMATURIA, SITE UNSPECIFIED: Primary | ICD-10-CM

## 2021-03-24 RX ORDER — NITROFURANTOIN 25; 75 MG/1; MG/1
100 CAPSULE ORAL 2 TIMES DAILY
Qty: 7 CAPSULE | Refills: 0 | Status: SHIPPED | OUTPATIENT
Start: 2021-03-24 | End: 2021-03-28

## 2021-03-24 NOTE — TELEPHONE ENCOUNTER
Patient was called and told to follow up with OB GYN  Patient was told at this time she is gonorrhea, and chylmadia negative  Patient reports she still has pressure in pelvis  Patient was told to follow up with ob gyn  Patient also was only given 7 capsules for macrobid not 7 days worth  A new script will be sent to pharmacy   Per patient request [facility]

## 2021-03-26 ENCOUNTER — DOCUMENTATION (OUTPATIENT)
Dept: GYNECOLOGY | Facility: CLINIC | Age: 31
End: 2021-03-26

## 2021-03-26 DIAGNOSIS — N94.9 VAGINAL BURNING: Primary | ICD-10-CM

## 2021-03-26 DIAGNOSIS — N94.819 VULVODYNIA: Primary | ICD-10-CM

## 2021-03-26 NOTE — PROGRESS NOTES
Pt called she had this burnng in her urethra, no signs of UTI no sign of yeast no itching no d/c  She is afaid it is herpes ( but has no blister no lesions )  Her friend was just diagnosed and now she is scared,  they did labs on her friend  She is requesting to have labs donetoday  I did make an appt  with you for Wed to check before surgery but wants to get done  Before appt or LEEP

## 2021-04-09 DIAGNOSIS — Z30.44 ENCOUNTER FOR SURVEILLANCE OF VAGINAL RING HORMONAL CONTRACEPTIVE DEVICE: ICD-10-CM

## 2021-04-09 RX ORDER — ETONOGESTREL AND ETHINYL ESTRADIOL 11.7; 2.7 MG/1; MG/1
INSERT, EXTENDED RELEASE VAGINAL
Qty: 3 EACH | Refills: 3 | Status: SHIPPED | OUTPATIENT
Start: 2021-04-09 | End: 2021-11-09 | Stop reason: SDUPTHER

## 2021-04-26 DIAGNOSIS — F90.9 ATTENTION DEFICIT HYPERACTIVITY DISORDER (ADHD), UNSPECIFIED ADHD TYPE: ICD-10-CM

## 2021-04-27 RX ORDER — DEXTROAMPHETAMINE SACCHARATE, AMPHETAMINE ASPARTATE MONOHYDRATE, DEXTROAMPHETAMINE SULFATE AND AMPHETAMINE SULFATE 5; 5; 5; 5 MG/1; MG/1; MG/1; MG/1
20 CAPSULE, EXTENDED RELEASE ORAL DAILY
Qty: 30 CAPSULE | Refills: 0 | Status: SHIPPED | OUTPATIENT
Start: 2021-04-27 | End: 2021-06-08 | Stop reason: SDUPTHER

## 2021-06-08 DIAGNOSIS — F90.9 ATTENTION DEFICIT HYPERACTIVITY DISORDER (ADHD), UNSPECIFIED ADHD TYPE: ICD-10-CM

## 2021-06-09 ENCOUNTER — DOCUMENTATION (OUTPATIENT)
Dept: GYNECOLOGY | Facility: CLINIC | Age: 31
End: 2021-06-09

## 2021-06-09 NOTE — PROGRESS NOTES
Pt called to cancel her LEEP for this Friday 6/11,  she states she had nasal surgery last week and is still recovering  from that I offered her date for next month 7/2  She is not sure she wants to get the LEEP done  Advised her she need to get this done, Told her I will let physcians know of cancellation

## 2021-06-10 ENCOUNTER — TELEPHONE (OUTPATIENT)
Dept: GYNECOLOGY | Facility: CLINIC | Age: 31
End: 2021-06-10

## 2021-06-10 RX ORDER — DEXTROAMPHETAMINE SACCHARATE, AMPHETAMINE ASPARTATE MONOHYDRATE, DEXTROAMPHETAMINE SULFATE AND AMPHETAMINE SULFATE 5; 5; 5; 5 MG/1; MG/1; MG/1; MG/1
20 CAPSULE, EXTENDED RELEASE ORAL DAILY
Qty: 30 CAPSULE | Refills: 0 | Status: SHIPPED | OUTPATIENT
Start: 2021-06-10 | End: 2021-07-07 | Stop reason: SDUPTHER

## 2021-06-10 NOTE — TELEPHONE ENCOUNTER
Patient has cancelled and rescheduled LEEP several times and stated to [facility] yesterday that she was not sure she was going to have it done  I spoke with patient today reinforcing a high grade lesion on colposcopy which could indicate cervical cancer which is life threatening if left untreated  Patient verbalized understanding but stated she was busy and we could discuss it at a later time

## 2021-06-14 ENCOUNTER — TELEPHONE (OUTPATIENT)
Dept: GYNECOLOGY | Facility: CLINIC | Age: 31
End: 2021-06-14

## 2021-06-14 ENCOUNTER — TELEPHONE (OUTPATIENT)
Dept: SURGICAL ONCOLOGY | Facility: CLINIC | Age: 31
End: 2021-06-14

## 2021-06-14 DIAGNOSIS — D06.9 HIGH GRADE SQUAMOUS INTRAEPITHELIAL LESION (HGSIL), GRADE 3 CIN, ON BIOPSY OF CERVIX: Primary | ICD-10-CM

## 2021-06-14 NOTE — TELEPHONE ENCOUNTER
Called pt per referral to gyn oncology  Pt stated she does not want an appointment with us  I am noting chart per phone call with pt

## 2021-07-07 DIAGNOSIS — F90.9 ATTENTION DEFICIT HYPERACTIVITY DISORDER (ADHD), UNSPECIFIED ADHD TYPE: ICD-10-CM

## 2021-07-07 RX ORDER — DEXTROAMPHETAMINE SACCHARATE, AMPHETAMINE ASPARTATE MONOHYDRATE, DEXTROAMPHETAMINE SULFATE AND AMPHETAMINE SULFATE 5; 5; 5; 5 MG/1; MG/1; MG/1; MG/1
20 CAPSULE, EXTENDED RELEASE ORAL DAILY
Qty: 30 CAPSULE | Refills: 0 | Status: SHIPPED | OUTPATIENT
Start: 2021-07-07 | End: 2021-08-20 | Stop reason: SDUPTHER

## 2021-07-13 ENCOUNTER — OFFICE VISIT (OUTPATIENT)
Dept: URGENT CARE | Facility: MEDICAL CENTER | Age: 31
End: 2021-07-13
Payer: COMMERCIAL

## 2021-07-13 VITALS
DIASTOLIC BLOOD PRESSURE: 71 MMHG | OXYGEN SATURATION: 100 % | SYSTOLIC BLOOD PRESSURE: 133 MMHG | TEMPERATURE: 97 F | HEART RATE: 86 BPM

## 2021-07-13 DIAGNOSIS — J02.9 SORE THROAT: ICD-10-CM

## 2021-07-13 DIAGNOSIS — J02.0 STREP PHARYNGITIS: Primary | ICD-10-CM

## 2021-07-13 LAB — S PYO AG THROAT QL: POSITIVE

## 2021-07-13 PROCEDURE — G0382 LEV 3 HOSP TYPE B ED VISIT: HCPCS | Performed by: PHYSICIAN ASSISTANT

## 2021-07-13 PROCEDURE — 87880 STREP A ASSAY W/OPTIC: CPT | Performed by: PHYSICIAN ASSISTANT

## 2021-07-13 RX ORDER — AMOXICILLIN 500 MG/1
500 CAPSULE ORAL EVERY 12 HOURS SCHEDULED
Qty: 20 CAPSULE | Refills: 0 | Status: SHIPPED | OUTPATIENT
Start: 2021-07-13 | End: 2021-07-23

## 2021-07-13 NOTE — PATIENT INSTRUCTIONS
Patient was educated on sore throat  Patient was educated on hydration  Patient was educated Rapid strep was positive  Antibiotic prescribed  Patient was told if symptoms get worst go to ED or PCP  Patient was given a referral for ENT  Sore Throat, Ambulatory Care   GENERAL INFORMATION:   A sore throat  is often caused by a cold or flu virus  A sore throat may also be caused by bacteria such as strep  Other causes include smoking, a runny nose, allergies, or acid reflux  Seek immediate care for the following symptoms:   · Trouble breathing or swallowing because your throat is swollen or sore    · Drooling because it hurts too much to swallow    · A painful lump in your throat that does not go away after 5 days    · A fever higher than 102? F (39?C) or lasts longer than 3 days    · Confusion    · Blood in your throat or ear  Treatment for a sore throat  will depend on the cause how severe it is  A sore throat cause by a virus will go away on its own without treatment  You will need antibiotics if your sore throat is caused by bacteria  Your sore throat should start to feel better within 3 to 5 days for both viral and bacterial infections  Care for your sore throat:   · Gargle with salt water  Mix ¼ teaspoon salt in a glass of warm water and gargle  This may help reduce swelling in your throat  · Take ibuprofen or acetaminophen:  These medicines decrease pain and fever  They are available without a doctor's order  Ask your healthcare provider which medicine is best for you  Ask how much to take and how often to take it  · Drink more liquids  Cold or warm drinks may help soothe your sore throat  Drinking liquids can also help prevent dehydration  · Use a cool-steam humidifier  to help moisten the air in your room and reduce your throat pain  · Use lozenges, ice, soft foods, or popsicles  to soothe your throat  · Rest your throat as much as possible  Try not to use your voice   This may irritate your throat and worsen your symptoms  Follow up with your healthcare provider as directed:  Write down your questions so you remember to ask them during your visits  CARE AGREEMENT:   You have the right to help plan your care  Learn about your health condition and how it may be treated  Discuss treatment options with your caregivers to decide what care you want to receive  You always have the right to refuse treatment  The above information is an  only  It is not intended as medical advice for individual conditions or treatments  Talk to your doctor, nurse or pharmacist before following any medical regimen to see if it is safe and effective for you  © 2014 [address] is for End User's use only and may not be sold, redistributed or otherwise used for commercial purposes  All illustrations and images included in CareNotes® are the copyrighted property of [employer]  or [employer]

## 2021-07-13 NOTE — PROGRESS NOTES
[facility] [employer] [facility]        NAME: Liya Ordaz is a 27 y o  female  : 1990    MRN: [id number]  DATE: 2021  TIME: 10:53 AM    Assessment and Plan   Strep pharyngitis [J02 0]  1  Strep pharyngitis  amoxicillin (AMOXIL) 500 mg capsule    Ambulatory Referral to Otolaryngology   2  Sore throat  POCT rapid strepA    Throat culture    amoxicillin (AMOXIL) 500 mg capsule    Ambulatory Referral to Otolaryngology       Rapid strep- positive  Patient Instructions     Patient was educated on sore throat  Patient was educated on hydration  Patient was educated Rapid strep was positive  Antibiotic prescribed  Patient was told if symptoms get worst go to ED or PCP  Chief Complaint     Chief Complaint   Patient presents with    Swollen Glands     Patient relates started with swollen glands for 2 days  Denies fever  Denies chest pain and SOB  No sore throat  She is not vaccinated for COVID  No exposure to anyone COVID positive  History of Present Illness       Patient presents today with sore throat and swollen glands  Patient reports cough only occurs when she has dry mouth  Patient admits COVID 19 in 2020  Patient is currently not vaccinated  Patient denies any chest pain, shortness of breath , ear pain or nausea and vomiting  Review of Systems   Review of Systems   Constitutional: Negative  HENT: Positive for sore throat  Respiratory: Negative  Cardiovascular: Negative  Gastrointestinal: Negative  Neurological: Negative  Psychiatric/Behavioral: Negative            Current Medications       Current Outpatient Medications:     amphetamine-dextroamphetamine (ADDERALL XR) 20 MG 24 hr capsule, Take 1 capsule (20 mg total) by mouth dailyMax Daily Amount: 20 mg, Disp: 30 capsule, Rfl: 0    etonogestrel-ethinyl estradiol (NUVARING) 0 12-0 015 MG/24HR vaginal ring, INSERT 1 RING VAGINALLY EVERY 28 DAYS, Disp: 3 each, Rfl: 3    amoxicillin (AMOXIL) 500 mg capsule, Take 1 capsule (500 mg total) by mouth every 12 (twelve) hours for 10 days, Disp: 20 capsule, Rfl: 0    nitrofurantoin (MACROBID) 100 mg capsule, Take 1 capsule (100 mg total) by mouth 2 (two) times a day (Patient not taking: Reported on 7/13/2021), Disp: 7 capsule, Rfl: 0    Current Allergies     Allergies as of 07/13/2021 - Reviewed 07/13/2021   Allergen Reaction Noted    Hydromorphone Hives 05/03/2019    Levofloxacin Other (See Comments) 06/22/2016            The following portions of the patient's history were reviewed and updated as appropriate: allergies, current medications, past family history, past medical history, past social history, past surgical history and problem list      Past Medical History:   Diagnosis Date    ADHD (attention deficit hyperactivity disorder)     Dysmenorrhea     Last Assessed: 12/22/2015    Postcoital bleeding     Last Assessed: 2/19/2015     Preop examination 12/7/2020    Vaginal pruritus     Resolved: 11/2/2017        Past Surgical History:   Procedure Laterality Date    AUGMENTATION BREAST      CERVICAL BIOPSY  W/ LOOP ELECTRODE EXCISION      CHOLECYSTECTOMY      COLPOSCOPY      FOOT SURGERY      right foot lis franc fracture    RECONSTRUCTION / CORRECTION OF NIPPLE / Nabil Helm      SURGERY OF LIP         Family History   Problem Relation Age of Onset    Esophageal cancer Father     Crohn's disease Mother     Diabetes Family     Cancer Family          Medications have been verified  Objective   /71   Pulse 86   Temp (!) 97 °F (36 1 °C)   LMP 07/02/2021   SpO2 100%   Patient's last menstrual period was 07/02/2021  Physical Exam     Physical Exam  HENT:      Head: Normocephalic  Right Ear: Tympanic membrane, ear canal and external ear normal       Left Ear: Tympanic membrane and external ear normal       Nose: Nose normal       Mouth/Throat:      Mouth: Mucous membranes are moist       Pharynx: Posterior oropharyngeal erythema present   No oropharyngeal exudate  Neck:      Comments: Swollen lymph nodes noted  Cardiovascular:      Rate and Rhythm: Normal rate and regular rhythm  Heart sounds: Normal heart sounds  Pulmonary:      Effort: Pulmonary effort is normal       Breath sounds: Normal breath sounds  Neurological:      Mental Status: She is alert and oriented to person, place, and time     Psychiatric:         Mood and Affect: Mood normal          Behavior: Behavior normal

## 2021-07-19 ENCOUNTER — DOCUMENTATION (OUTPATIENT)
Dept: GYNECOLOGY | Facility: CLINIC | Age: 31
End: 2021-07-19

## 2021-08-20 DIAGNOSIS — F90.9 ATTENTION DEFICIT HYPERACTIVITY DISORDER (ADHD), UNSPECIFIED ADHD TYPE: ICD-10-CM

## 2021-08-20 RX ORDER — DEXTROAMPHETAMINE SACCHARATE, AMPHETAMINE ASPARTATE MONOHYDRATE, DEXTROAMPHETAMINE SULFATE AND AMPHETAMINE SULFATE 5; 5; 5; 5 MG/1; MG/1; MG/1; MG/1
20 CAPSULE, EXTENDED RELEASE ORAL DAILY
Qty: 30 CAPSULE | Refills: 0 | Status: SHIPPED | OUTPATIENT
Start: 2021-08-20 | End: 2021-09-19 | Stop reason: SDUPTHER

## 2021-09-19 DIAGNOSIS — F90.9 ATTENTION DEFICIT HYPERACTIVITY DISORDER (ADHD), UNSPECIFIED ADHD TYPE: ICD-10-CM

## 2021-09-20 RX ORDER — DEXTROAMPHETAMINE SACCHARATE, AMPHETAMINE ASPARTATE MONOHYDRATE, DEXTROAMPHETAMINE SULFATE AND AMPHETAMINE SULFATE 5; 5; 5; 5 MG/1; MG/1; MG/1; MG/1
20 CAPSULE, EXTENDED RELEASE ORAL DAILY
Qty: 30 CAPSULE | Refills: 0 | Status: SHIPPED | OUTPATIENT
Start: 2021-09-20 | End: 2021-11-19 | Stop reason: SDUPTHER

## 2021-09-22 ENCOUNTER — OFFICE VISIT (OUTPATIENT)
Dept: INTERNAL MEDICINE CLINIC | Facility: CLINIC | Age: 31
End: 2021-09-22
Payer: COMMERCIAL

## 2021-09-22 VITALS
HEIGHT: 64 IN | OXYGEN SATURATION: 95 % | DIASTOLIC BLOOD PRESSURE: 78 MMHG | TEMPERATURE: 97.5 F | BODY MASS INDEX: 24.75 KG/M2 | HEART RATE: 82 BPM | SYSTOLIC BLOOD PRESSURE: 120 MMHG | WEIGHT: 145 LBS

## 2021-09-22 DIAGNOSIS — F90.0 ATTENTION DEFICIT HYPERACTIVITY DISORDER (ADHD), PREDOMINANTLY INATTENTIVE TYPE: ICD-10-CM

## 2021-09-22 DIAGNOSIS — Z00.00 ANNUAL PHYSICAL EXAM: Primary | ICD-10-CM

## 2021-09-22 PROCEDURE — 1036F TOBACCO NON-USER: CPT | Performed by: INTERNAL MEDICINE

## 2021-09-22 PROCEDURE — 99395 PREV VISIT EST AGE 18-39: CPT | Performed by: INTERNAL MEDICINE

## 2021-09-22 PROCEDURE — 3008F BODY MASS INDEX DOCD: CPT | Performed by: INTERNAL MEDICINE

## 2021-09-22 NOTE — PATIENT INSTRUCTIONS

## 2021-09-22 NOTE — PROGRESS NOTES
[facility]    NAME: Doug Gordon  AGE: 32 y o  SEX: female  : 1990     DATE: 2021     Assessment and Plan:     Problem List Items Addressed This Visit        Other    Attention-deficit/hyperactivity disorder     Stable on Adderall  Controlled substance agreement form signed today           Other Visit Diagnoses     Annual physical exam    -  Primary          Immunizations and preventive care screenings were discussed with patient today  Appropriate education was printed on patient's after visit summary  Flu vaccine recommended annually  COVID vaccine recommended which she is considering at the end of the year (Had COVID in Nov)  Counseling:  · Healthy diet and regular exercise         Return in about 1 year (around 2022)  Chief Complaint:     Chief Complaint   Patient presents with    Follow-up      History of Present Illness:     Adult Annual Physical   Patient here for a comprehensive physical exam  The patient reports no problems  Diet and Physical Activity  · Diet/Nutrition: well balanced diet  · Exercise: 3-4 times a week on average  Depression Screening  PHQ-9 Depression Screening    PHQ-9:   Frequency of the following problems over the past two weeks:           General Health  · Sleep: sleeps well  · Hearing: normal - bilateral   · Vision: goes for regular eye exams and wears contacts  · Dental: regular dental visits  /GYN Health  · Last menstrual period: "regular"  · Contraceptive method: Nuvaring  Review of Systems:     Review of Systems   Constitutional: Negative for chills, fatigue, fever and unexpected weight change  HENT: Negative for rhinorrhea  Respiratory: Negative for cough and shortness of breath  Cardiovascular: Negative for chest pain and palpitations  Gastrointestinal: Negative for abdominal pain, constipation, diarrhea, nausea and vomiting  Genitourinary: Negative for difficulty urinating  Musculoskeletal: Negative for arthralgias and myalgias  Neurological: Negative for dizziness and headaches  Psychiatric/Behavioral: Negative for dysphoric mood and sleep disturbance  The patient is not nervous/anxious  Past Medical History:     Past Medical History:   Diagnosis Date    ADHD (attention deficit hyperactivity disorder)     Dysmenorrhea     Last Assessed: 12/22/2015    Postcoital bleeding     Last Assessed: 2/19/2015     Preop examination 12/7/2020    Vaginal pruritus     Resolved: 11/2/2017       Past Surgical History:     Past Surgical History:   Procedure Laterality Date    AUGMENTATION BREAST      CERVICAL BIOPSY  W/ LOOP ELECTRODE EXCISION      CHOLECYSTECTOMY      COLPOSCOPY      FOOT SURGERY      right foot lis franc fracture    RECONSTRUCTION / CORRECTION OF NIPPLE / Kirill Rouge      SURGERY OF LIP        Social History:     Social History     Socioeconomic History    Marital status: Single     Spouse name: None    Number of children: None    Years of education: None    Highest education level: None   Occupational History    None   Tobacco Use    Smoking status: Never Smoker    Smokeless tobacco: Never Used   Vaping Use    Vaping Use: Never used   Substance and Sexual Activity    Alcohol use: Yes     Comment: occassional, per allscripts: Being A Social Drinker     Drug use: No    Sexual activity: Yes     Partners: Male     Birth control/protection: Ring   Other Topics Concern    None   Social History Narrative          Social Determinants of Health     Financial Resource Strain:     Difficulty of Paying Living Expenses:    Food Insecurity:     Worried About Running Out of Food in the Last Year:     Ran Out of Food in the Last Year:    Transportation Needs:     Lack of Transportation (Medical):      Lack of Transportation (Non-Medical):    Physical Activity:     Days of Exercise per Week:     Minutes of Exercise per Session:    Stress:     Feeling of Stress :    Social Connections:     Frequency of Communication with Friends and Family:     Frequency of Social Gatherings with Friends and Family:     Attends Scientologist Services:     Active Member of Clubs or Organizations:     Attends Club or Organization Meetings:     Marital Status:    Intimate Partner Violence:     Fear of Current or Ex-Partner:     Emotionally Abused:     Physically Abused:     Sexually Abused:       Family History:     Family History   Problem Relation Age of Onset    Esophageal cancer Father     Crohn's disease Mother     Diabetes Family     Cancer Family       Current Medications:     Current Outpatient Medications   Medication Sig Dispense Refill    amphetamine-dextroamphetamine (ADDERALL XR) 20 MG 24 hr capsule Take 1 capsule (20 mg total) by mouth dailyMax Daily Amount: 20 mg 30 capsule 0    etonogestrel-ethinyl estradiol (NUVARING) 0 12-0 015 MG/24HR vaginal ring INSERT 1 RING VAGINALLY EVERY 28 DAYS 3 each 3     No current facility-administered medications for this visit  Allergies: Allergies   Allergen Reactions    Hydromorphone Hives    Levofloxacin Other (See Comments)     Legs go numb      Physical Exam:     /78   Pulse 82   Temp 97 5 °F (36 4 °C) (Temporal)   Ht 5' 4" (1 626 m)   Wt 65 8 kg (145 lb)   SpO2 95%   BMI 24 89 kg/m²     Physical Exam  Constitutional:       General: She is not in acute distress  Appearance: She is well-developed  She is not ill-appearing, toxic-appearing or diaphoretic  HENT:      Head: Normocephalic and atraumatic  Right Ear: External ear normal  There is no impacted cerumen  Left Ear: External ear normal  There is no impacted cerumen  Eyes:      Conjunctiva/sclera: Conjunctivae normal    Cardiovascular:      Rate and Rhythm: Normal rate and regular rhythm  Heart sounds: Normal heart sounds  No murmur heard       Pulmonary:      Effort: Pulmonary effort is normal  No respiratory distress  Breath sounds: Normal breath sounds  No stridor  No wheezing or rales  Abdominal:      General: There is no distension  Palpations: Abdomen is soft  There is no mass  Tenderness: There is no abdominal tenderness  There is no guarding or rebound  Musculoskeletal:      Cervical back: Neck supple  Right lower leg: No edema  Left lower leg: No edema  Skin:     General: Skin is warm and dry  Neurological:      Mental Status: She is alert and oriented to person, place, and time  Psychiatric:         Mood and Affect: Mood normal          Behavior: Behavior normal          Thought Content:  Thought content normal          Judgment: Judgment normal           Janine Franco MD   [facility]

## 2021-10-13 ENCOUNTER — TELEPHONE (OUTPATIENT)
Dept: INTERNAL MEDICINE CLINIC | Facility: CLINIC | Age: 31
End: 2021-10-13

## 2021-11-08 ENCOUNTER — DOCUMENTATION (OUTPATIENT)
Dept: GYNECOLOGY | Facility: CLINIC | Age: 31
End: 2021-11-08

## 2021-11-09 ENCOUNTER — OFFICE VISIT (OUTPATIENT)
Dept: GYNECOLOGY | Facility: CLINIC | Age: 31
End: 2021-11-09
Payer: COMMERCIAL

## 2021-11-09 VITALS — DIASTOLIC BLOOD PRESSURE: 88 MMHG | BODY MASS INDEX: 25.16 KG/M2 | SYSTOLIC BLOOD PRESSURE: 132 MMHG | WEIGHT: 146.6 LBS

## 2021-11-09 DIAGNOSIS — Z30.44 ENCOUNTER FOR SURVEILLANCE OF VAGINAL RING HORMONAL CONTRACEPTIVE DEVICE: ICD-10-CM

## 2021-11-09 DIAGNOSIS — D06.9 HIGH GRADE SQUAMOUS INTRAEPITHELIAL LESION (HGSIL), GRADE 3 CIN, ON BIOPSY OF CERVIX: Primary | ICD-10-CM

## 2021-11-09 DIAGNOSIS — B37.3 MONILIAL VULVOVAGINITIS: ICD-10-CM

## 2021-11-09 DIAGNOSIS — B37.9 YEAST INFECTION: ICD-10-CM

## 2021-11-09 PROCEDURE — 1036F TOBACCO NON-USER: CPT | Performed by: OBSTETRICS & GYNECOLOGY

## 2021-11-09 PROCEDURE — 99213 OFFICE O/P EST LOW 20 MIN: CPT | Performed by: OBSTETRICS & GYNECOLOGY

## 2021-11-09 RX ORDER — ETONOGESTREL AND ETHINYL ESTRADIOL 11.7; 2.7 MG/1; MG/1
1 INSERT, EXTENDED RELEASE VAGINAL
Qty: 3 EACH | Refills: 3 | Status: SHIPPED | OUTPATIENT
Start: 2021-11-09 | End: 2022-06-22 | Stop reason: SDUPTHER

## 2021-11-09 RX ORDER — FLUCONAZOLE 150 MG/1
150 TABLET ORAL ONCE
Qty: 2 TABLET | Refills: 0 | Status: SHIPPED | OUTPATIENT
Start: 2021-11-09 | End: 2021-11-09

## 2021-11-12 ENCOUNTER — PROCEDURE VISIT (OUTPATIENT)
Dept: GYNECOLOGY | Facility: CLINIC | Age: 31
End: 2021-11-12
Payer: COMMERCIAL

## 2021-11-12 DIAGNOSIS — D06.9 CIN III (CERVICAL INTRAEPITHELIAL NEOPLASIA GRADE III) WITH SEVERE DYSPLASIA: Primary | ICD-10-CM

## 2021-11-12 PROCEDURE — 88307 TISSUE EXAM BY PATHOLOGIST: CPT | Performed by: PATHOLOGY

## 2021-11-12 PROCEDURE — 57522 CONIZATION OF CERVIX: CPT | Performed by: OBSTETRICS & GYNECOLOGY

## 2021-11-17 ENCOUNTER — TELEPHONE (OUTPATIENT)
Dept: GYNECOLOGY | Facility: CLINIC | Age: 31
End: 2021-11-17

## 2021-11-18 ENCOUNTER — APPOINTMENT (OUTPATIENT)
Dept: LAB | Facility: MEDICAL CENTER | Age: 31
End: 2021-11-18
Payer: COMMERCIAL

## 2021-11-18 DIAGNOSIS — Z01.84 IMMUNITY STATUS TESTING: ICD-10-CM

## 2021-11-18 PROCEDURE — 86769 SARS-COV-2 COVID-19 ANTIBODY: CPT

## 2021-11-18 PROCEDURE — 36415 COLL VENOUS BLD VENIPUNCTURE: CPT

## 2021-11-19 ENCOUNTER — TELEPHONE (OUTPATIENT)
Dept: OTHER | Facility: OTHER | Age: 31
End: 2021-11-19

## 2021-11-19 DIAGNOSIS — F90.9 ATTENTION DEFICIT HYPERACTIVITY DISORDER (ADHD), UNSPECIFIED ADHD TYPE: ICD-10-CM

## 2021-11-19 LAB
SARS-COV-2 IGG SERPL QL IA: REACTIVE
SARS-COV-2 IGG+IGM SERPL QL IA: REACTIVE

## 2021-11-19 RX ORDER — DEXTROAMPHETAMINE SACCHARATE, AMPHETAMINE ASPARTATE MONOHYDRATE, DEXTROAMPHETAMINE SULFATE AND AMPHETAMINE SULFATE 5; 5; 5; 5 MG/1; MG/1; MG/1; MG/1
20 CAPSULE, EXTENDED RELEASE ORAL DAILY
Qty: 30 CAPSULE | Refills: 0 | Status: SHIPPED | OUTPATIENT
Start: 2021-11-19 | End: 2021-12-27 | Stop reason: SDUPTHER

## 2021-11-22 ENCOUNTER — DOCUMENTATION (OUTPATIENT)
Dept: GYNECOLOGY | Facility: CLINIC | Age: 31
End: 2021-11-22

## 2021-11-26 ENCOUNTER — OFFICE VISIT (OUTPATIENT)
Dept: GYNECOLOGY | Facility: CLINIC | Age: 31
End: 2021-11-26

## 2021-11-26 VITALS
DIASTOLIC BLOOD PRESSURE: 72 MMHG | HEIGHT: 64 IN | SYSTOLIC BLOOD PRESSURE: 122 MMHG | BODY MASS INDEX: 24.92 KG/M2 | HEART RATE: 86 BPM | WEIGHT: 146 LBS

## 2021-11-26 DIAGNOSIS — Z48.89 POSTOPERATIVE VISIT: Primary | ICD-10-CM

## 2021-11-26 PROCEDURE — 3008F BODY MASS INDEX DOCD: CPT | Performed by: OBSTETRICS & GYNECOLOGY

## 2021-11-26 PROCEDURE — 99024 POSTOP FOLLOW-UP VISIT: CPT | Performed by: OBSTETRICS & GYNECOLOGY

## 2021-12-16 ENCOUNTER — OFFICE VISIT (OUTPATIENT)
Dept: URGENT CARE | Facility: MEDICAL CENTER | Age: 31
End: 2021-12-16
Payer: COMMERCIAL

## 2021-12-16 VITALS
HEIGHT: 64 IN | RESPIRATION RATE: 16 BRPM | TEMPERATURE: 98.6 F | OXYGEN SATURATION: 100 % | WEIGHT: 145 LBS | BODY MASS INDEX: 24.75 KG/M2 | SYSTOLIC BLOOD PRESSURE: 119 MMHG | DIASTOLIC BLOOD PRESSURE: 77 MMHG | HEART RATE: 85 BPM

## 2021-12-16 DIAGNOSIS — R30.9 PAINFUL URINATION: Primary | ICD-10-CM

## 2021-12-16 LAB
SL AMB  POCT GLUCOSE, UA: ABNORMAL
SL AMB LEUKOCYTE ESTERASE,UA: ABNORMAL
SL AMB POCT BILIRUBIN,UA: ABNORMAL
SL AMB POCT BLOOD,UA: ABNORMAL
SL AMB POCT CLARITY,UA: ABNORMAL
SL AMB POCT COLOR,UA: ABNORMAL
SL AMB POCT KETONES,UA: ABNORMAL
SL AMB POCT NITRITE,UA: POSITIVE
SL AMB POCT PH,UA: 6
SL AMB POCT SPECIFIC GRAVITY,UA: 1.02
SL AMB POCT URINE PROTEIN: ABNORMAL
SL AMB POCT UROBILINOGEN: 0.2

## 2021-12-16 PROCEDURE — G0382 LEV 3 HOSP TYPE B ED VISIT: HCPCS | Performed by: PHYSICIAN ASSISTANT

## 2021-12-16 PROCEDURE — 87086 URINE CULTURE/COLONY COUNT: CPT | Performed by: PHYSICIAN ASSISTANT

## 2021-12-16 PROCEDURE — 81002 URINALYSIS NONAUTO W/O SCOPE: CPT | Performed by: PHYSICIAN ASSISTANT

## 2021-12-16 RX ORDER — PHENAZOPYRIDINE HYDROCHLORIDE 200 MG/1
200 TABLET, FILM COATED ORAL
Qty: 10 TABLET | Refills: 0 | Status: SHIPPED | OUTPATIENT
Start: 2021-12-16

## 2021-12-16 RX ORDER — CEPHALEXIN 500 MG/1
500 CAPSULE ORAL EVERY 12 HOURS SCHEDULED
Qty: 14 CAPSULE | Refills: 0 | Status: SHIPPED | OUTPATIENT
Start: 2021-12-16 | End: 2021-12-23

## 2021-12-17 LAB — BACTERIA UR CULT: NORMAL

## 2021-12-27 DIAGNOSIS — F90.9 ATTENTION DEFICIT HYPERACTIVITY DISORDER (ADHD), UNSPECIFIED ADHD TYPE: ICD-10-CM

## 2021-12-27 RX ORDER — DEXTROAMPHETAMINE SACCHARATE, AMPHETAMINE ASPARTATE MONOHYDRATE, DEXTROAMPHETAMINE SULFATE AND AMPHETAMINE SULFATE 5; 5; 5; 5 MG/1; MG/1; MG/1; MG/1
20 CAPSULE, EXTENDED RELEASE ORAL DAILY
Qty: 30 CAPSULE | Refills: 0 | Status: SHIPPED | OUTPATIENT
Start: 2021-12-27 | End: 2022-01-26 | Stop reason: SDUPTHER

## 2022-01-26 DIAGNOSIS — F90.9 ATTENTION DEFICIT HYPERACTIVITY DISORDER (ADHD), UNSPECIFIED ADHD TYPE: ICD-10-CM

## 2022-01-27 RX ORDER — DEXTROAMPHETAMINE SACCHARATE, AMPHETAMINE ASPARTATE MONOHYDRATE, DEXTROAMPHETAMINE SULFATE AND AMPHETAMINE SULFATE 5; 5; 5; 5 MG/1; MG/1; MG/1; MG/1
20 CAPSULE, EXTENDED RELEASE ORAL DAILY
Qty: 30 CAPSULE | Refills: 0 | Status: SHIPPED | OUTPATIENT
Start: 2022-01-27 | End: 2022-03-03 | Stop reason: SDUPTHER

## 2022-01-31 ENCOUNTER — TELEMEDICINE (OUTPATIENT)
Dept: INTERNAL MEDICINE CLINIC | Facility: CLINIC | Age: 32
End: 2022-01-31
Payer: COMMERCIAL

## 2022-01-31 DIAGNOSIS — J02.0 ACUTE STREPTOCOCCAL PHARYNGITIS: Primary | ICD-10-CM

## 2022-01-31 PROCEDURE — 99213 OFFICE O/P EST LOW 20 MIN: CPT | Performed by: NURSE PRACTITIONER

## 2022-01-31 RX ORDER — AMOXICILLIN AND CLAVULANATE POTASSIUM 875; 125 MG/1; MG/1
1 TABLET, FILM COATED ORAL EVERY 12 HOURS SCHEDULED
Qty: 14 TABLET | Refills: 0 | Status: SHIPPED | OUTPATIENT
Start: 2022-01-31 | End: 2022-02-07

## 2022-01-31 NOTE — ASSESSMENT & PLAN NOTE
Start augmentin  Drink lots of water and fluids  Take ibuprofen for pain  Call back if not improving

## 2022-01-31 NOTE — PROGRESS NOTES
Virtual Regular Visit    Verification of patient location:    Patient is located in the following state in which I hold an active license [place]      Assessment/Plan:    Problem List Items Addressed This Visit        Digestive    Acute streptococcal pharyngitis - Primary     Start augmentin  Drink lots of water and fluids  Take ibuprofen for pain  Call back if not improving  Relevant Medications    amoxicillin-clavulanate (AUGMENTIN) 875-125 mg per tablet               Reason for visit is   Chief Complaint   Patient presents with    Follow-up     poss strep throat - symptom started x2 days    Virtual Regular Visit        Encounter provider CARLEY Collins    Provider located at [address] [place] [phone]      Recent Visits  No visits were found meeting these conditions  Showing recent visits within past 7 days and meeting all other requirements  Today's Visits  Date Type Provider Dept   01/31/22 Telemedicine Verna Collins today's visits and meeting all other requirements  Future Appointments  No visits were found meeting these conditions  Showing future appointments within next 150 days and meeting all other requirements       The patient was identified by name and date of birth  Tom Garcia was informed that this is a telemedicine visit and that the visit is being conducted through [facility] and patient was informed this is a secure, HIPAA-complaint platform  She agrees to proceed     My office door was closed  No one else was in the room  She acknowledged consent and understanding of privacy and security of the video platform  The patient has agreed to participate and understands they can discontinue the visit at any time  Patient is aware this is a billable service  Subjective  Tom Garcia is a 32 y o  female          Patient is here for a very sore throat on the left side with swollen tonsils  There are white spots on the left tonsil and it is painful to swallow  Denies fever chills cough shortness of breath  She has prominent tonsils and is prone to getting throat infections       Past Medical History:   Diagnosis Date    ADHD (attention deficit hyperactivity disorder)     Dysmenorrhea     Last Assessed: 12/22/2015    Postcoital bleeding     Last Assessed: 2/19/2015     Preop examination 12/7/2020    Vaginal pruritus     Resolved: 11/2/2017        Past Surgical History:   Procedure Laterality Date    AUGMENTATION BREAST      CERVICAL BIOPSY  W/ LOOP ELECTRODE EXCISION      CHOLECYSTECTOMY      COLPOSCOPY      FOOT SURGERY      right foot lis franc fracture    RECONSTRUCTION / CORRECTION OF NIPPLE / AEROLA      SURGERY OF LIP         Current Outpatient Medications   Medication Sig Dispense Refill    amphetamine-dextroamphetamine (ADDERALL XR) 20 MG 24 hr capsule Take 1 capsule (20 mg total) by mouth daily Max Daily Amount: 20 mg 30 capsule 0    etonogestrel-ethinyl estradiol (NUVARING) 0 12-0 015 MG/24HR vaginal ring Insert 1 each into the vagina every 30 (thirty) days Insert vaginally and leave in place for 3 consecutive weeks, then remove for 1 week  3 each 3    loratadine (CLARITIN) 10 mg tablet Take 1 tablet (10 mg total) by mouth daily 30 tablet 2    amoxicillin-clavulanate (AUGMENTIN) 875-125 mg per tablet Take 1 tablet by mouth every 12 (twelve) hours for 7 days 14 tablet 0    phenazopyridine (PYRIDIUM) 200 mg tablet Take 1 tablet (200 mg total) by mouth 3 (three) times a day with meals 10 tablet 0     No current facility-administered medications for this visit  Allergies   Allergen Reactions    Hydromorphone Hives    Levofloxacin Other (See Comments)     Legs go numb       Review of Systems   Constitutional: Negative  HENT: Positive for sore throat  Eyes: Negative  Respiratory: Negative  Cardiovascular: Negative  Gastrointestinal: Negative  Musculoskeletal: Negative  Neurological: Negative  Video Exam    There were no vitals filed for this visit  Physical Exam  Vitals reviewed  Neurological:      Mental Status: She is alert  I spent 15 minutes directly with the patient during this visit    VIRTUAL VISIT DISCLAIMER      Torin Ohms verbally agrees to participate in [employer]  Pt is aware that [employer] could be limited without vital signs or the ability to perform a full hands-on physical exam  Di Adame understands she or the provider may request at any time to terminate the video visit and request the patient to seek care or treatment in person

## 2022-01-31 NOTE — PROGRESS NOTES
Virtual Regular Visit    Verification of patient location:    Patient is located in the following state in which I hold an active license { amb virtual patient location:[address]}      Assessment/Plan:    Problem List Items Addressed This Visit     None               Reason for visit is   Chief Complaint   Patient presents with    Follow-up     poss strep throat - symptom started x2 days    Virtual Regular Visit    Health Maintenance reviewed - {health maintenance:476209}  Encounter provider Tanner Lambert [facility]    Provider located at [address]  [address] [place] [phone]      Recent Visits  No visits were found meeting these conditions  Showing recent visits within past 7 days and meeting all other requirements  Today's Visits  Date Type Provider Dept   01/31/22 Telemedicine Verna Lambert today's visits and meeting all other requirements  Future Appointments  No visits were found meeting these conditions  Showing future appointments within next 150 days and meeting all other requirements       The patient was identified by name and date of birth  Fco Barker was informed that this is a telemedicine visit and that the visit is being conducted through {AMB VIRTUAL VISIT GSBKTW:09325}  {Telemedicine confidentiality :88753} {Telemedicine participants:30145}  She acknowledged consent and understanding of privacy and security of the video platform  The patient has agreed to participate and understands they can discontinue the visit at any time  Patient is aware this is a billable service  Subjective  Fco Barker is a 32 y o  female ***         HPI     Past Medical History:   Diagnosis Date    ADHD (attention deficit hyperactivity disorder)     Dysmenorrhea     Last Assessed: 12/22/2015    Postcoital bleeding     Last Assessed: 2/19/2015     Preop examination 12/7/2020    Vaginal pruritus     Resolved: 11/2/2017 Past Surgical History:   Procedure Laterality Date    AUGMENTATION BREAST      CERVICAL BIOPSY  W/ LOOP ELECTRODE EXCISION      CHOLECYSTECTOMY      COLPOSCOPY      FOOT SURGERY      right foot lis franc fracture    RECONSTRUCTION / CORRECTION OF NIPPLE / Lorretta Galas      SURGERY OF LIP         Current Outpatient Medications   Medication Sig Dispense Refill    amphetamine-dextroamphetamine (ADDERALL XR) 20 MG 24 hr capsule Take 1 capsule (20 mg total) by mouth daily Max Daily Amount: 20 mg 30 capsule 0    etonogestrel-ethinyl estradiol (NUVARING) 0 12-0 015 MG/24HR vaginal ring Insert 1 each into the vagina every 30 (thirty) days Insert vaginally and leave in place for 3 consecutive weeks, then remove for 1 week  3 each 3    loratadine (CLARITIN) 10 mg tablet Take 1 tablet (10 mg total) by mouth daily 30 tablet 2    phenazopyridine (PYRIDIUM) 200 mg tablet Take 1 tablet (200 mg total) by mouth 3 (three) times a day with meals 10 tablet 0     No current facility-administered medications for this visit  Allergies   Allergen Reactions    Hydromorphone Hives    Levofloxacin Other (See Comments)     Legs go numb       Review of Systems    Video Exam    There were no vitals filed for this visit  Physical Exam     {Time Spent:79081}    VIRTUAL VISIT DISCLAIMER      Di Zaragoza verbally agrees to participate in [employer]  Pt is aware that [employer] could be limited without vital signs or the ability to perform a full hands-on physical exam  Di Zaragoza understands she or the provider may request at any time to terminate the video visit and request the patient to seek care or treatment in person

## 2022-02-01 ENCOUNTER — TELEPHONE (OUTPATIENT)
Dept: GYNECOLOGY | Facility: CLINIC | Age: 32
End: 2022-02-01

## 2022-02-01 NOTE — TELEPHONE ENCOUNTER
Patient is on antibiotic and now has yeast infection  Can you please call diflucan into [facility] in the [facility] on [address]?

## 2022-02-08 PROCEDURE — 87070 CULTURE OTHR SPECIMN AEROBIC: CPT | Performed by: OTOLARYNGOLOGY

## 2022-02-09 ENCOUNTER — LAB REQUISITION (OUTPATIENT)
Dept: LAB | Facility: HOSPITAL | Age: 32
End: 2022-02-09
Payer: COMMERCIAL

## 2022-02-09 DIAGNOSIS — J03.91 ACUTE RECURRENT TONSILLITIS, UNSPECIFIED: ICD-10-CM

## 2022-02-11 LAB — BACTERIA THROAT CULT: NORMAL

## 2022-03-03 DIAGNOSIS — F90.9 ATTENTION DEFICIT HYPERACTIVITY DISORDER (ADHD), UNSPECIFIED ADHD TYPE: ICD-10-CM

## 2022-03-03 RX ORDER — DEXTROAMPHETAMINE SACCHARATE, AMPHETAMINE ASPARTATE MONOHYDRATE, DEXTROAMPHETAMINE SULFATE AND AMPHETAMINE SULFATE 5; 5; 5; 5 MG/1; MG/1; MG/1; MG/1
20 CAPSULE, EXTENDED RELEASE ORAL DAILY
Qty: 30 CAPSULE | Refills: 0 | Status: SHIPPED | OUTPATIENT
Start: 2022-03-03 | End: 2022-04-22 | Stop reason: SDUPTHER

## 2022-03-08 ENCOUNTER — TELEMEDICINE (OUTPATIENT)
Dept: INTERNAL MEDICINE CLINIC | Facility: CLINIC | Age: 32
End: 2022-03-08
Payer: COMMERCIAL

## 2022-03-08 DIAGNOSIS — Z11.2 SCREENING FOR STREPTOCOCCAL INFECTION: ICD-10-CM

## 2022-03-08 DIAGNOSIS — J02.9 PHARYNGOTONSILLITIS: Primary | ICD-10-CM

## 2022-03-08 DIAGNOSIS — J03.90 PHARYNGOTONSILLITIS: Primary | ICD-10-CM

## 2022-03-08 LAB — S PYO AG THROAT QL: NEGATIVE

## 2022-03-08 PROCEDURE — 99213 OFFICE O/P EST LOW 20 MIN: CPT | Performed by: INTERNAL MEDICINE

## 2022-03-08 PROCEDURE — 87070 CULTURE OTHR SPECIMN AEROBIC: CPT | Performed by: INTERNAL MEDICINE

## 2022-03-08 PROCEDURE — 1036F TOBACCO NON-USER: CPT | Performed by: INTERNAL MEDICINE

## 2022-03-08 RX ORDER — SULFACETAMIDE SODIUM AND SULFUR 10; 5 MG/G; MG/G
RINSE TOPICAL
COMMUNITY
Start: 2022-02-28

## 2022-03-08 RX ORDER — IVERMECTIN 10 MG/G
CREAM TOPICAL
COMMUNITY
Start: 2022-02-28

## 2022-03-08 RX ORDER — FLUCONAZOLE 150 MG/1
TABLET ORAL
COMMUNITY
Start: 2022-02-28 | End: 2022-07-25 | Stop reason: SDUPTHER

## 2022-03-08 NOTE — PROGRESS NOTES
Virtual Regular Visit    Verification of patient location:    Patient is located in the following state in which I hold an active license [place]      Assessment/Plan:    She is asked to come in today for a throat swab and culture  She understands this could be negative since she has taken 3 doses of Augmentin  Explained that the frequency of her symptoms does not fulfill the criteria yet for a tonsillectomy  (three episodes yearly for ? three years; five episodes yearly for two years; or seven episodes in one year )  Will complete treatment for strep throat based on symptoms and history    Problem List Items Addressed This Visit     None      Visit Diagnoses     Pharyngotonsillitis    -  Primary               Reason for visit is   Chief Complaint   Patient presents with    Virtual Regular Visit        Encounter provider Lacho Haynes MD    Provider located at [address] [place], [place] [id number]      Recent Visits  No visits were found meeting these conditions  Showing recent visits within past 7 days and meeting all other requirements  Today's Visits  Date Type Provider Dept   03/08/22 Telemedicine Lacho Haynes MD [address] today's visits and meeting all other requirements  Future Appointments  No visits were found meeting these conditions  Showing future appointments within next 150 days and meeting all other requirements       The patient was identified by name and date of birth  Danita Palomino was informed that this is a telemedicine visit and that the visit is being conducted through [facility] and patient was informed this is a secure, HIPAA-complaint platform  She agrees to proceed     My office door was closed  No one else was in the room  She acknowledged consent and understanding of privacy and security of the video platform   The patient has agreed to participate and understands they can discontinue the visit at any time     Patient is aware this is a billable service  Subjective  Daphne Flynn is a 32 y o  female with a sore throat   HPI   3 days ago started with a sore throat swollen glands and a cough  Tonsils are swollen without exudates  She had the same symptoms in January and took Augmentin  Symptoms persisted and she saw ENT to discuss tonsillectomy  Throat culture at that point was negative  She has left over Augmentin so started it yesterday  She had a +rapid strep in July  She feels fine between episodes  Interested in having a tonsillectomy because of frequency of episodes and severity of symptoms disrupting work, sleep  Past Medical History:   Diagnosis Date    ADHD (attention deficit hyperactivity disorder)     Dysmenorrhea     Last Assessed: 12/22/2015    Postcoital bleeding     Last Assessed: 2/19/2015     Vaginal pruritus     Resolved: 11/2/2017        Past Surgical History:   Procedure Laterality Date    AUGMENTATION BREAST      CERVICAL BIOPSY  W/ LOOP ELECTRODE EXCISION      CHOLECYSTECTOMY      COLPOSCOPY      FOOT SURGERY      right foot lis franc fracture    RECONSTRUCTION / CORRECTION OF NIPPLE / AEROLA      RHINOPLASTY         Current Outpatient Medications   Medication Sig Dispense Refill    amphetamine-dextroamphetamine (ADDERALL XR) 20 MG 24 hr capsule Take 1 capsule (20 mg total) by mouth daily Max Daily Amount: 20 mg 30 capsule 0    etonogestrel-ethinyl estradiol (NUVARING) 0 12-0 015 MG/24HR vaginal ring Insert 1 each into the vagina every 30 (thirty) days Insert vaginally and leave in place for 3 consecutive weeks, then remove for 1 week  3 each 3    fluconazole (DIFLUCAN) 150 mg tablet Take one pill BY MOUTH WEEKLY FOR FOUR WEEKS   If taking A statin medication, please hold statin ON THE DAYS taking fluconazole      loratadine (CLARITIN) 10 mg tablet Take 1 tablet (10 mg total) by mouth daily 30 tablet 2    phenazopyridine (PYRIDIUM) 200 mg tablet Take 1 tablet (200 mg total) by mouth 3 (three) times a day with meals 10 tablet 0    Soolantra 1 % CREA Apply TO face ONCE daily      Sulfacetamide Sodium-Sulfur 10-5 % LIQD WASH face EVERY MORNING       No current facility-administered medications for this visit  Allergies   Allergen Reactions    Hydromorphone Hives    Levofloxacin Other (See Comments)     Legs go numb       Review of Systems   Constitutional: Negative for chills and fever  HENT: Positive for sore throat  Respiratory: Positive for cough  Video Exam    There were no vitals filed for this visit  Physical Exam  Constitutional:       General: She is not in acute distress  Appearance: She is not ill-appearing, toxic-appearing or diaphoretic  Pulmonary:      Effort: No respiratory distress  Psychiatric:         Mood and Affect: Mood normal          Behavior: Behavior normal           I spent 15 minutes directly with the patient during this visit    VIRTUAL VISIT Ana Laura Irving verbally agrees to participate in [facility]  Pt is aware that [facility] could be limited without vital signs or the ability to perform a full hands-on physical exam  Di Irving understands she or the provider may request at any time to terminate the video visit and request the patient to seek care or treatment in person

## 2022-03-10 LAB — BACTERIA THROAT CULT: NORMAL

## 2022-04-06 ENCOUNTER — DOCUMENTATION (OUTPATIENT)
Dept: GYNECOLOGY | Facility: CLINIC | Age: 32
End: 2022-04-06

## 2022-04-06 NOTE — PROGRESS NOTES
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

## 2022-04-06 NOTE — PROGRESS NOTES
I spoke with patient  I did not see the results on my own, but patient read the results to me and HSV 1 negative  HSV2 IgM negative  HSV2 IgG positive  She has never had an outbreak  Explained genital herpes at length and stressed that this diagnosis must be disclosed to any future partners before any sexual contact  Explained that virus can be spread with and without outbreak, suppression, condoms  Discussed suppression and would like to start  Valtrex sent to pharmacy and explained  She will call if she has any outbreaks  Of note, patient was tested because current partner of 2 years had an outbreak  Emotional support given  She was encouraged to call with any questions

## 2022-04-07 ENCOUNTER — DOCUMENTATION (OUTPATIENT)
Dept: GYNECOLOGY | Facility: CLINIC | Age: 32
End: 2022-04-07

## 2022-04-08 ENCOUNTER — DOCUMENTATION (OUTPATIENT)
Dept: GYNECOLOGY | Facility: CLINIC | Age: 32
End: 2022-04-08

## 2022-04-08 NOTE — PROGRESS NOTES
Pt sent the HSV labs and it was reviewed by Dr Zacarias Miguelito   IgG was elevated at 20 09  IgM was in normal      Pt was given an explaination    And wants to have this done again in 3-4 months   Will put in reminder qeue

## 2022-04-22 DIAGNOSIS — F90.9 ATTENTION DEFICIT HYPERACTIVITY DISORDER (ADHD), UNSPECIFIED ADHD TYPE: ICD-10-CM

## 2022-04-22 RX ORDER — DEXTROAMPHETAMINE SACCHARATE, AMPHETAMINE ASPARTATE MONOHYDRATE, DEXTROAMPHETAMINE SULFATE AND AMPHETAMINE SULFATE 5; 5; 5; 5 MG/1; MG/1; MG/1; MG/1
20 CAPSULE, EXTENDED RELEASE ORAL DAILY
Qty: 30 CAPSULE | Refills: 0 | Status: SHIPPED | OUTPATIENT
Start: 2022-04-22 | End: 2022-07-08 | Stop reason: SDUPTHER

## 2022-04-22 NOTE — TELEPHONE ENCOUNTER
Pt had last OV in person 09/22/2021  Controlled Substance Review    Controlled Substance Review    [place]MP or [place]  reviewed: No red flags were identified; safe to proceed with prescription  Jaxson Grimes [id number] 03/03/2022 03/03/2022 Adderall Xr (Capsule, Extended Release) 30 0 30 20 MG NA [facility] [place] [facility]  [employer] 00 / 0 [place]   2 [id number] 01/27/2022 01/27/2022 Adderall Xr (Capsule, Extended Release) 30 0 30 20 MG NA [facility] [place] [facility] [employer] 00 / 0 [place]   1 [id number] 12/27/2021 12/27/2021 Adderall Xr (Capsule, Extended Release) 30 0 30 20 MG NA [facility] [place] [facility]  [employer]    Pdmp checked   Order sent to provider  For approval    [facility]

## 2022-05-23 ENCOUNTER — ANNUAL EXAM (OUTPATIENT)
Dept: GYNECOLOGY | Facility: CLINIC | Age: 32
End: 2022-05-23
Payer: COMMERCIAL

## 2022-05-23 VITALS
SYSTOLIC BLOOD PRESSURE: 98 MMHG | HEIGHT: 64 IN | BODY MASS INDEX: 26.29 KG/M2 | WEIGHT: 154 LBS | DIASTOLIC BLOOD PRESSURE: 68 MMHG

## 2022-05-23 DIAGNOSIS — B00.9 HSV-2 INFECTION: ICD-10-CM

## 2022-05-23 DIAGNOSIS — Z01.419 ENCOUNTER FOR GYNECOLOGICAL EXAMINATION WITH PAPANICOLAOU SMEAR OF CERVIX: Primary | ICD-10-CM

## 2022-05-23 DIAGNOSIS — Z01.411 ENCOUNTER FOR GYNECOLOGICAL EXAMINATION (GENERAL) (ROUTINE) WITH ABNORMAL FINDINGS: Primary | ICD-10-CM

## 2022-05-23 PROCEDURE — G0145 SCR C/V CYTO,THINLAYER,RESCR: HCPCS | Performed by: OBSTETRICS & GYNECOLOGY

## 2022-05-23 PROCEDURE — G0476 HPV COMBO ASSAY CA SCREEN: HCPCS

## 2022-05-23 PROCEDURE — 99395 PREV VISIT EST AGE 18-39: CPT | Performed by: OBSTETRICS & GYNECOLOGY

## 2022-05-23 NOTE — PROGRESS NOTES
Assessment/Plan:    The patient likes current contraceptive method and desires to continue [employer], denies need for refill  Recommended monthly SBE and annual CBE  ASCCP guidelines reviewed and pap collected today  The patient declines STI testing at this time  She is aware that condoms are recommended with all sexual contact for STI prevention  Instructed on need to notify all partners of HSV prior to any sexual contact    Return to the office in one year for routine annual gyn exam or sooner PRN  Diagnoses and all orders for this visit:    Encounter for gynecological examination (general) (routine) with abnormal findings    HSV-2 infection        Subjective:      Patient ID: Bunny Dee is a 32 y o  female  This patient presents for routine annual gyn exam    She is s/p LEEP 11/12/21 with the following pathology:   A  Uterine Cervix, Transformation Zone, LEEP:  - Benign transformation zone mucosa with acute and chronic inflammation, microglandular hyperplasia, and reactive changes      B  Uterine Cervix, Cap Endocervical Canal, Excision:  - Benign     She did well post op  She has hx of positive HSV Type 2 IgG  She is taking Valtrex daily  She has never had an outbreak  She declines repeat testing  She is aware that she must inform any partner of diagnosis prior to any sexual contact  She denies acute gyn complaints  Menses are light and regular on Nuva Ring  She denies breast concerns, has B/L breast implants and right breast lift  She denies abn discharge, pelvic pain, bowel/bladder dysfunction, depression/anxiety  She reports 2 partners in the last 12 months with condom use  Denies STI concerns  Likes current contraception and desires to continue  She has completed Gardasil           The following portions of the patient's history were reviewed and updated as appropriate: allergies, current medications, past family history, past medical history, past social history, past surgical history and problem list     Review of Systems   Constitutional: Negative  HENT: Negative  Respiratory: Negative  Cardiovascular: Negative  Gastrointestinal: Negative  Endocrine: Negative  Genitourinary: Negative for difficulty urinating, dyspareunia, dysuria, frequency, menstrual problem, pelvic pain, urgency, vaginal bleeding, vaginal discharge and vaginal pain  Musculoskeletal: Negative  Skin: Negative  Neurological: Negative  Psychiatric/Behavioral: Negative  Objective:      BP 98/68   Ht 5' 4" (1 626 m)   Wt 69 9 kg (154 lb)   LMP 05/02/2022   BMI 26 43 kg/m²          Physical Exam  Vitals and nursing note reviewed  Exam conducted with a chaperone present  Constitutional:       Appearance: Normal appearance  She is well-developed  HENT:      Head: Normocephalic and atraumatic  Neck:      Thyroid: No thyroid mass or thyromegaly  Cardiovascular:      Rate and Rhythm: Normal rate and regular rhythm  Heart sounds: Normal heart sounds  Pulmonary:      Effort: Pulmonary effort is normal       Breath sounds: Normal breath sounds  Chest:   Breasts: Breasts are symmetrical       Right: Skin change (reconstruction scar noted) present  No inverted nipple, mass, nipple discharge or tenderness  Left: No inverted nipple, mass, nipple discharge, skin change or tenderness  Comments: B/L implants noted  Abdominal:      General: Bowel sounds are normal       Palpations: Abdomen is soft  Tenderness: There is no abdominal tenderness  Hernia: There is no hernia in the left inguinal area or right inguinal area  Genitourinary:     General: Normal vulva  Exam position: Supine  Pubic Area: No rash  Labia:         Right: No rash, tenderness, lesion or injury  Left: No rash, tenderness, lesion or injury  Urethra: No prolapse, urethral pain, urethral swelling or urethral lesion  Vagina: Normal  No signs of injury and foreign body   No vaginal discharge, erythema, tenderness, bleeding, lesions or prolapsed vaginal walls  Cervix: No cervical motion tenderness, discharge, friability, lesion, erythema, cervical bleeding or eversion  Uterus: Not deviated, not enlarged, not fixed, not tender and no uterine prolapse  Adnexa:         Right: No mass, tenderness or fullness  Left: No mass, tenderness or fullness  Rectum: No external hemorrhoid  Comments: Urethra normal without lesions  No bladder tenderness  Musculoskeletal:         General: Normal range of motion  Cervical back: Normal range of motion and neck supple  Lymphadenopathy:      Lower Body: No right inguinal adenopathy  No left inguinal adenopathy  Skin:     General: Skin is warm and dry  Neurological:      Mental Status: She is alert and oriented to person, place, and time  Psychiatric:         Speech: Speech normal          Behavior: Behavior normal  Behavior is cooperative

## 2022-05-27 LAB
HPV HR 12 DNA CVX QL NAA+PROBE: NEGATIVE
HPV16 DNA CVX QL NAA+PROBE: NEGATIVE
HPV18 DNA CVX QL NAA+PROBE: NEGATIVE

## 2022-05-30 LAB
LAB AP GYN PRIMARY INTERPRETATION: NORMAL
Lab: NORMAL

## 2022-06-03 ENCOUNTER — DOCUMENTATION (OUTPATIENT)
Dept: GYNECOLOGY | Facility: CLINIC | Age: 32
End: 2022-06-03

## 2022-06-03 NOTE — PROGRESS NOTES
Pt called she put in a generic Nuvaring for the first time last night  She has noticed more cramps and wanted to no if it was a higher dose ,  Told her it is not higher strength just a different make up   regis monitor over the weekend if no better will try to get brand name for her

## 2022-06-08 ENCOUNTER — TELEPHONE (OUTPATIENT)
Dept: GYNECOLOGY | Facility: CLINIC | Age: 32
End: 2022-06-08

## 2022-06-08 NOTE — TELEPHONE ENCOUNTER
Patient does not feel good taking birth control pill  She would like a prior auth to be started for nuva ring

## 2022-06-15 DIAGNOSIS — Z30.44 ENCOUNTER FOR SURVEILLANCE OF VAGINAL RING HORMONAL CONTRACEPTIVE DEVICE: ICD-10-CM

## 2022-06-22 DIAGNOSIS — Z30.44 ENCOUNTER FOR SURVEILLANCE OF VAGINAL RING HORMONAL CONTRACEPTIVE DEVICE: ICD-10-CM

## 2022-06-22 RX ORDER — ETONOGESTREL AND ETHINYL ESTRADIOL 11.7; 2.7 MG/1; MG/1
1 INSERT, EXTENDED RELEASE VAGINAL
Qty: 3 EACH | Refills: 3 | Status: SHIPPED | OUTPATIENT
Start: 2022-06-22

## 2022-06-24 ENCOUNTER — DOCUMENTATION (OUTPATIENT)
Dept: GYNECOLOGY | Facility: CLINIC | Age: 32
End: 2022-06-24

## 2022-07-08 DIAGNOSIS — F90.9 ATTENTION DEFICIT HYPERACTIVITY DISORDER (ADHD), UNSPECIFIED ADHD TYPE: ICD-10-CM

## 2022-07-09 RX ORDER — DEXTROAMPHETAMINE SACCHARATE, AMPHETAMINE ASPARTATE MONOHYDRATE, DEXTROAMPHETAMINE SULFATE AND AMPHETAMINE SULFATE 5; 5; 5; 5 MG/1; MG/1; MG/1; MG/1
20 CAPSULE, EXTENDED RELEASE ORAL DAILY
Qty: 30 CAPSULE | Refills: 0 | Status: SHIPPED | OUTPATIENT
Start: 2022-07-09 | End: 2022-09-19 | Stop reason: SDUPTHER

## 2022-07-25 DIAGNOSIS — B37.9 YEAST INFECTION: Primary | ICD-10-CM

## 2022-07-25 RX ORDER — FLUCONAZOLE 150 MG/1
TABLET ORAL
Qty: 2 TABLET | Refills: 0 | Status: SHIPPED | OUTPATIENT
Start: 2022-07-25 | End: 2022-07-28

## 2022-07-28 ENCOUNTER — TELEPHONE (OUTPATIENT)
Dept: GYNECOLOGY | Facility: CLINIC | Age: 32
End: 2022-07-28

## 2022-07-28 DIAGNOSIS — B37.9 YEAST INFECTION: Primary | ICD-10-CM

## 2022-07-28 RX ORDER — OXYCODONE HYDROCHLORIDE AND ACETAMINOPHEN 5; 325 MG/1; MG/1
1 TABLET ORAL EVERY 6 HOURS PRN
COMMUNITY
Start: 2022-06-01

## 2022-07-28 RX ORDER — CLOCORTOLONE PIVALATE 0.1 %
CREAM (GRAM) TOPICAL
COMMUNITY
Start: 2022-07-21

## 2022-07-28 RX ORDER — PIMECROLIMUS 10 MG/G
CREAM TOPICAL
COMMUNITY
Start: 2022-07-21

## 2022-07-28 RX ORDER — SCOLOPAMINE TRANSDERMAL SYSTEM 1 MG/1
PATCH, EXTENDED RELEASE TRANSDERMAL
COMMUNITY
Start: 2022-06-01

## 2022-07-28 NOTE — TELEPHONE ENCOUNTER
Pt advised to set up appt but she has her period  When she started with the yeast symptoms on Sat  She was not able to start meds until Monday  Then she had to get cream  For external irritation, and just get worse so fast     She usually gets another 2 pills and it clears up for her   If the 2 pills dont work she will call back when period is done  The 2 pills did help her at first did get some relief from them but because she waited it got out of hand

## 2022-07-28 NOTE — TELEPHONE ENCOUNTER
Patient called and had yeast infection and she took pills but still has symptoms  Can we send another script?   Please advise

## 2022-07-29 RX ORDER — FLUCONAZOLE 150 MG/1
150 TABLET ORAL
Qty: 2 TABLET | Refills: 1 | Status: SHIPPED | OUTPATIENT
Start: 2022-07-29 | End: 2022-10-25 | Stop reason: SDUPTHER

## 2022-08-02 ENCOUNTER — APPOINTMENT (OUTPATIENT)
Dept: LAB | Facility: MEDICAL CENTER | Age: 32
End: 2022-08-02
Payer: COMMERCIAL

## 2022-08-02 ENCOUNTER — CONSULT (OUTPATIENT)
Dept: INTERNAL MEDICINE CLINIC | Facility: CLINIC | Age: 32
End: 2022-08-02
Payer: COMMERCIAL

## 2022-08-02 VITALS
BODY MASS INDEX: 25.74 KG/M2 | HEART RATE: 108 BPM | WEIGHT: 150.8 LBS | DIASTOLIC BLOOD PRESSURE: 84 MMHG | SYSTOLIC BLOOD PRESSURE: 124 MMHG | OXYGEN SATURATION: 99 % | HEIGHT: 64 IN

## 2022-08-02 DIAGNOSIS — N89.8 VAGINAL ITCHING: ICD-10-CM

## 2022-08-02 DIAGNOSIS — Z01.818 PRE-OPERATIVE CLEARANCE: Primary | ICD-10-CM

## 2022-08-02 DIAGNOSIS — Z01.818 PRE-OPERATIVE CLEARANCE: ICD-10-CM

## 2022-08-02 LAB
ANION GAP SERPL CALCULATED.3IONS-SCNC: 3 MMOL/L (ref 4–13)
BUN SERPL-MCNC: 7 MG/DL (ref 5–25)
CALCIUM SERPL-MCNC: 9.2 MG/DL (ref 8.3–10.1)
CHLORIDE SERPL-SCNC: 107 MMOL/L (ref 96–108)
CO2 SERPL-SCNC: 25 MMOL/L (ref 21–32)
CREAT SERPL-MCNC: 0.63 MG/DL (ref 0.6–1.3)
ERYTHROCYTE [DISTWIDTH] IN BLOOD BY AUTOMATED COUNT: 12.1 % (ref 11.6–15.1)
GFR SERPL CREATININE-BSD FRML MDRD: 119 ML/MIN/1.73SQ M
GLUCOSE P FAST SERPL-MCNC: 94 MG/DL (ref 65–99)
HCT VFR BLD AUTO: 41.4 % (ref 34.8–46.1)
HGB BLD-MCNC: 14 G/DL (ref 11.5–15.4)
INR PPP: 0.92 (ref 0.84–1.19)
MCH RBC QN AUTO: 30.4 PG (ref 26.8–34.3)
MCHC RBC AUTO-ENTMCNC: 33.8 G/DL (ref 31.4–37.4)
MCV RBC AUTO: 90 FL (ref 82–98)
PLATELET # BLD AUTO: 313 THOUSANDS/UL (ref 149–390)
PMV BLD AUTO: 10.2 FL (ref 8.9–12.7)
POTASSIUM SERPL-SCNC: 3.9 MMOL/L (ref 3.5–5.3)
PROTHROMBIN TIME: 12.6 SECONDS (ref 11.6–14.5)
RBC # BLD AUTO: 4.61 MILLION/UL (ref 3.81–5.12)
SODIUM SERPL-SCNC: 135 MMOL/L (ref 135–147)
WBC # BLD AUTO: 8.55 THOUSAND/UL (ref 4.31–10.16)

## 2022-08-02 PROCEDURE — 87491 CHLMYD TRACH DNA AMP PROBE: CPT

## 2022-08-02 PROCEDURE — 99215 OFFICE O/P EST HI 40 MIN: CPT | Performed by: INTERNAL MEDICINE

## 2022-08-02 PROCEDURE — 87591 N.GONORRHOEAE DNA AMP PROB: CPT

## 2022-08-02 PROCEDURE — 36415 COLL VENOUS BLD VENIPUNCTURE: CPT

## 2022-08-02 PROCEDURE — 85610 PROTHROMBIN TIME: CPT

## 2022-08-02 PROCEDURE — 81514 NFCT DS BV&VAGINITIS DNA ALG: CPT | Performed by: INTERNAL MEDICINE

## 2022-08-02 PROCEDURE — 80048 BASIC METABOLIC PNL TOTAL CA: CPT

## 2022-08-02 PROCEDURE — 85027 COMPLETE CBC AUTOMATED: CPT

## 2022-08-02 RX ORDER — CEPHALEXIN 500 MG/1
500 CAPSULE ORAL EVERY 4 HOURS
COMMUNITY
Start: 2022-08-01 | End: 2022-09-21 | Stop reason: ALTCHOICE

## 2022-08-02 NOTE — PROGRESS NOTES
INTERNAL MEDICINE OFFICE VISIT  [facility]       ASSESSMENT/PLAN:    Diagnoses and all orders for this visit:      Vaginal itching  -     Chlamydia/GC amplified DNA by PCR; Future  -     Molecular Vaginal Panel; Future  - Will obtain these in the clinic and send for testing    SUBJECTIVE     Patient ID: Amanda Shaw is a 32 y o  female  Patient reports vaginal itching  Has recurrent yeast infections but this prior infection lasted longer than usual  Had a new sexual partner  No deep cervical tenderness, N/V/D, vaginal discharge or abnormal bleeding, fever, chills  Interested in Chlamydia and GC testing  Declines HIV, herpes testing  The following portions of the patient's history were reviewed and updated as appropriate: allergies, current medications, past family history, past medical history, past social history, past surgical history and problem list     Review of Systems   Genitourinary: Positive for vaginal pain  OBJECTIVE:      /84   Pulse (!) 108   Ht 5' 4" (1 626 m)   Wt 68 4 kg (150 lb 12 8 oz)   SpO2 99%   BMI 25 88 kg/m²        Physical Exam  Constitutional:       General: She is not in acute distress  Appearance: Normal appearance  HENT:      Head: Normocephalic and atraumatic  Cardiovascular:      Rate and Rhythm: Normal rate and regular rhythm  Pulmonary:      Effort: Pulmonary effort is normal       Breath sounds: Normal breath sounds  Abdominal:      General: Bowel sounds are normal       Palpations: Abdomen is soft  Tenderness: There is no abdominal tenderness  Musculoskeletal:      Right lower leg: No edema  Left lower leg: No edema  Skin:     General: Skin is warm and dry  Neurological:      General: No focal deficit present  Mental Status: She is alert and oriented to person, place, and time     Psychiatric:         Mood and Affect: Mood normal          Behavior: Behavior normal

## 2022-08-02 NOTE — PROGRESS NOTES
INTERNAL MEDICINE PRE-OPERATIVE EVALUATION  [facility]    NAME: Tom Garcia  AGE: 32 y o  SEX: female  : 1990     DATE: 2022     Internal Medicine Pre-Operative Evaluation:     Chief Complaint: Pre-operative Evaluation     Surgery: Abdominoplasty, adipose injections in her abdomen   Anticipated Date of Surgery: 2022  Referring Provider: Isela Pizano MD        History of Present Illness:     Tom Garcia is a 32 y o  female who presents to the office today for a preoperative consultation at the request of surgeon, Shailesh Larsen MD, who plans on performing abdominoplasty/adipose injections on 22  Planned anesthesia is general  Patient has a bleeding risk of: no recent abnormal bleeding  Patient does not have objections to receiving blood products if needed  Current anti-platelet/anti-coagulation medications that the patient is prescribed includes: none  Assessment of Chronic Conditions:   Medical history of ADHD, GERD, seasonal allergies     Assessment of Cardiac Risk:  · Denies unstable or severe angina or MI in the last 6 weeks or history of stent placement in the last year   · Denies decompensated heart failure (e g  New onset heart failure, NYHA functional class IV heart failure, or worsening existing heart failure)  · Denies significant arrhythmias such as high grade AV block, symptomatic ventricular arrhythmia, newly recognized ventricular tachycardia, supraventricular tachycardia with resting heart rate >100, or symptomatic bradycardia  · Denies severe heart valve disease including aortic stenosis or symptomatic mitral stenosis     Exercise Capacity:  · Able to walk 4 blocks without symptoms?: Yes  · Able to walk 2 flights without symptoms?: Yes    Prior Anesthesia Reactions: No     Personal history of venous thromboembolic disease? No    History of steroid use for >2 weeks within last year?  No    STOP-BANG Sleep Apnea Screening Questionnaire:      Do you SNORE loudly (louder than talking or loud enough to be heard through closed doors)? Yes = 1 point   Do you often feel TIRED, fatigued, or sleepy during daytime? No = 0 point   Has anyone OBSERVED you stop breathing during your sleep? No = 0 point   Do you have or are you being treated for high blood pressure? No = 0 point   BMI more than 35 kg/m2? No = 0 point   AGE over 48years old? No = 0 point   NECK circumference > 16 inches (40 cm)? No = 0 point   Male GENDER? No = 0 point   TOTAL SCORE 1 = LOW risk of GOGO       Review of Systems:     Review of Systems   Constitutional: Negative for chills and fever  HENT: Negative for congestion and ear pain  Respiratory: Negative for shortness of breath  Cardiovascular: Negative for chest pain  Gastrointestinal: Negative for abdominal pain  Genitourinary: Negative for difficulty urinating  Musculoskeletal: Negative for back pain  Skin: Negative for rash  Neurological: Negative for syncope and light-headedness  Psychiatric/Behavioral: Negative for agitation  Problem List:     Patient Active Problem List   Diagnosis    Attention-deficit/hyperactivity disorder    Heartburn    Encounter for annual routine gynecological examination    Family history of esophageal cancer    Gastroesophageal reflux disease without esophagitis    Seasonal allergies    Acute streptococcal pharyngitis        Allergies:      Allergies   Allergen Reactions    Hydromorphone Hives    Levofloxacin Other (See Comments)     Legs go numb        Current Medications:       Current Outpatient Medications:     amphetamine-dextroamphetamine (ADDERALL XR) 20 MG 24 hr capsule, Take 1 capsule (20 mg total) by mouth daily Max Daily Amount: 20 mg (Patient taking differently: Take 20 mg by mouth if needed), Disp: 30 capsule, Rfl: 0    etonogestrel-ethinyl estradiol (NUVARING) 0 12-0 015 MG/24HR vaginal ring, Insert 1 each into the vagina every 30 (thirty) days Insert vaginally and leave in place for 3 consecutive weeks, then remove for 1 week , Disp: 3 each, Rfl: 3    cephalexin (KEFLEX) 500 mg capsule, Take 500 mg by mouth every 4 (four) hours, Disp: , Rfl:     Cloderm 0 1 % cream, Apply TO AFFECTED AREA(S) TWICE A DAY FOR flares (Patient not taking: Reported on 8/2/2022), Disp: , Rfl:     fluconazole (DIFLUCAN) 150 mg tablet, Take 1 tablet (150 mg total) by mouth every 3 (three) days for 2 doses (Patient not taking: Reported on 8/2/2022), Disp: 2 tablet, Rfl: 1    loratadine (CLARITIN) 10 mg tablet, Take 1 tablet (10 mg total) by mouth daily (Patient not taking: Reported on 8/2/2022), Disp: 30 tablet, Rfl: 2    oxyCODONE-acetaminophen (PERCOCET) 5-325 mg per tablet, Take 1 tablet by mouth every 6 (six) hours as needed (Patient not taking: Reported on 8/2/2022), Disp: , Rfl:     phenazopyridine (PYRIDIUM) 200 mg tablet, Take 1 tablet (200 mg total) by mouth 3 (three) times a day with meals (Patient not taking: Reported on 8/2/2022), Disp: 10 tablet, Rfl: 0    pimecrolimus (ELIDEL) 1 % cream, Apply twice daily TO rash  (Patient not taking: Reported on 8/2/2022), Disp: , Rfl:     scopolamine (TRANSDERM-SCOP) 1 mg/3 days TD 72 hr patch, APPLY BEHIND EAR NIGHT BEFORE SURGERY, REMOVE AFTER 72 HOURS   (Patient not taking: Reported on 8/2/2022), Disp: , Rfl:     Soolantra 1 % CREA, Apply TO face ONCE daily (Patient not taking: Reported on 8/2/2022), Disp: , Rfl:     Sulfacetamide Sodium-Sulfur 10-5 % LIQD, WASH face EVERY MORNING (Patient not taking: Reported on 8/2/2022), Disp: , Rfl:     valACYclovir (VALTREX) 500 mg tablet, Take 1 tablet (500 mg total) by mouth daily, Disp: 30 tablet, Rfl: 6     Past History:     Past Medical History:   Diagnosis Date    ADHD (attention deficit hyperactivity disorder)     Dysmenorrhea     Last Assessed: 12/22/2015    HSV-2 infection     Postcoital bleeding     Last Assessed: 2/19/2015     Vaginal pruritus     Resolved: 11/2/2017         Past Surgical History:   Procedure Laterality Date    AUGMENTATION BREAST      CERVICAL BIOPSY  W/ LOOP ELECTRODE EXCISION      CHOLECYSTECTOMY      COLPOSCOPY      FOOT SURGERY      right foot lis franc fracture    RECONSTRUCTION / CORRECTION OF NIPPLE / Steen Farhad      REVISION OF SCAR N/A 08/01/2022    RHINOPLASTY          Family History   Problem Relation Age of Onset    Esophageal cancer Father     Crohn's disease Mother     Diabetes Family     Cancer Family         Social History     Socioeconomic History    Marital status: Single     Spouse name: Not on file    Number of children: Not on file    Years of education: Not on file    Highest education level: Not on file   Occupational History    Not on file   Tobacco Use    Smoking status: Never Smoker    Smokeless tobacco: Never Used   Vaping Use    Vaping Use: Never used   Substance and Sexual Activity    Alcohol use: Yes     Alcohol/week: 2 0 standard drinks     Types: 2 Glasses of wine per week    Drug use: No    Sexual activity: Yes     Partners: Male     Birth control/protection: Ring   Other Topics Concern    Not on file   Social History Narrative         Consumes 1-2 cups of coffee per day     Social Determinants of Health     Financial Resource Strain: Not on file   Food Insecurity: Not on file   Transportation Needs: Not on file   Physical Activity: Not on file   Stress: Not on file   Social Connections: Not on file   Intimate Partner Violence: Not on file   Housing Stability: Not on file        Physical Exam:      /84   Pulse (!) 108   Ht 5' 4" (1 626 m)   Wt 68 4 kg (150 lb 12 8 oz)   SpO2 99%   BMI 25 88 kg/m²     Physical Exam  Constitutional:       General: She is not in acute distress  Appearance: Normal appearance  HENT:      Head: Normocephalic and atraumatic  Cardiovascular:      Rate and Rhythm: Normal rate and regular rhythm     Pulmonary:      Effort: Pulmonary effort is normal  Breath sounds: Normal breath sounds  Abdominal:      General: Bowel sounds are normal       Palpations: Abdomen is soft  Tenderness: There is no abdominal tenderness  Musculoskeletal:      Right lower leg: No edema  Left lower leg: No edema  Skin:     General: Skin is warm and dry  Neurological:      General: No focal deficit present  Mental Status: She is alert and oriented to person, place, and time  Psychiatric:         Mood and Affect: Mood normal          Behavior: Behavior normal            Data:     Pre-operative work-up    Laboratory Results: Ordered a BMP, CBC, PT/INR for patient    EKG: Not required per surgeon paperwork      Previous cardiopulmonary studies within the past year:  · Echocardiogram: None  · Cardiac Catheterization: None  · Stress Test: None   · Pulmonary Function Testing: None       Assessment:     1  Pre-operative clearance  CBC and Platelet    Basic metabolic panel    Protime-INR   2  Vaginal itching  Chlamydia/GC amplified DNA by PCR    Molecular Vaginal Panel        Plan:     32 y o  female with planned surgery: Abdominoplasty, adipose injections into abdomen  Cardiac Risk Estimation: per the Revised Cardiac Risk Index (Circ  [id number], 1999), the patient's risk factors for cardiac complications include None, putting her in: RCI RISK CLASS I (0 risk factors, risk of major cardiac compl  appr  0 5%)    1  Further preoperative workup as follows:   - Complete blood count  - Basic metabolic profile  - Coagulation studies    2  Medication Management/Recommendations:   - Patient has been instructed to avoid herbs or non-directed vitamins the week prior to surgery to ensure no drug interactions with perioperative surgical and anesthetic medications  - Patient has been instructed to avoid aspirin containing medications or non-steroidal anti-inflammatory drugs for the week preceding surgery  - Patient reports she currently has Nuvaring and takes Adderall  Advised her to hold Adderall on day of surgery   -Recently completed cephalexin for a minor skin repair procedure, site is well healing per patient  3  Prophylaxis for cardiac events with perioperative beta-blockers: not indicated  4  Patient requires further consultation with: None    Clearance  Patient is CLEARED for surgery without any additional cardiac testing       Eloina Reynaga DO  [facility] [id number]  Phone#  [phone]  Fax#  [phone]

## 2022-08-03 LAB
C GLABRATA DNA VAG QL NAA+PROBE: NEGATIVE
C KRUSEI DNA VAG QL NAA+PROBE: NEGATIVE
C TRACH DNA SPEC QL NAA+PROBE: NEGATIVE
CANDIDA SP 6 PNL VAG NAA+PROBE: NEGATIVE
N GONORRHOEA DNA SPEC QL NAA+PROBE: NEGATIVE
T VAGINALIS DNA VAG QL NAA+PROBE: NEGATIVE
VAGINOSIS/ITIS DNA PNL VAG PROBE+SIG AMP: NEGATIVE

## 2022-08-04 ENCOUNTER — TELEPHONE (OUTPATIENT)
Dept: INTERNAL MEDICINE CLINIC | Facility: CLINIC | Age: 32
End: 2022-08-04

## 2022-08-05 NOTE — TELEPHONE ENCOUNTER
Patient called, she states he never sent over the patients lab results over to the Surgeons office in [place]    Please Advise  Fax: [phone]

## 2022-08-16 ENCOUNTER — NURSE TRIAGE (OUTPATIENT)
Dept: OTHER | Facility: OTHER | Age: 32
End: 2022-08-16

## 2022-08-16 ENCOUNTER — TELEMEDICINE (OUTPATIENT)
Dept: INTERNAL MEDICINE CLINIC | Facility: CLINIC | Age: 32
End: 2022-08-16
Payer: COMMERCIAL

## 2022-08-16 DIAGNOSIS — J02.0 ACUTE STREPTOCOCCAL PHARYNGITIS: Primary | ICD-10-CM

## 2022-08-16 LAB — S PYO AG THROAT QL: POSITIVE

## 2022-08-16 PROCEDURE — 99212 OFFICE O/P EST SF 10 MIN: CPT | Performed by: INTERNAL MEDICINE

## 2022-08-16 PROCEDURE — 87880 STREP A ASSAY W/OPTIC: CPT | Performed by: INTERNAL MEDICINE

## 2022-08-16 NOTE — PROGRESS NOTES
Virtual Regular Visit    Verification of patient location:    Patient is located in the following state in which I hold an active license [place]      Assessment/Plan:    Problem List Items Addressed This Visit        Digestive    Acute streptococcal pharyngitis - Primary     Reported sore throat for a day, and this is the third time she had sore throat, possibly strep A pharyngitis this year  She reported she already started amoxicillin for 2 doses   Will order strep throat A test for her   Follow up if symptoms were to get worse  Instruct Denay to contact us if worsens or other concerns         Relevant Orders    Strep A PCR               Reason for visit is   Chief Complaint   Patient presents with    Virtual Regular Visit        Encounter provider Germania Castillo MD    Provider located at [address] [place] [id number]      Recent Visits  No visits were found meeting these conditions  Showing recent visits within past 7 days and meeting all other requirements  Today's Visits  Date Type Provider Dept   08/16/22 Telemedicine Angus Soto MD [address] [facility] today's visits and meeting all other requirements  Future Appointments  No visits were found meeting these conditions  Showing future appointments within next 150 days and meeting all other requirements       The patient was identified by name and date of birth  Roque Roe was informed that this is a telemedicine visit and that the visit is being conducted through [address] [place] [address] Now and patient was informed that this is a secure, HIPAA-compliant platform  She agrees to proceed     My office door was closed  No one else was in the room  She acknowledged consent and understanding of privacy and security of the video platform  The patient has agreed to participate and understands they can discontinue the visit at any time  Patient is aware this is a billable service  Hali Cruz is a 28 y o  female       This is a 28year old female patient with concern of sore throat and requesting strep throat test    She stated sore throat started yesterday, and COVID test was negative at home  She denied other symptoms such as fever, chills, cough, or SOB  She stated that this is the third time this year that she had sore throat and possibly strep throat  She can see white spots on her tonsils, even though wasn't able to visualize on the video  She does not appear to be in acute distress  Past Medical History:   Diagnosis Date    ADHD (attention deficit hyperactivity disorder)     Dysmenorrhea     Last Assessed: 12/22/2015    HSV-2 infection     Postcoital bleeding     Last Assessed: 2/19/2015     Vaginal pruritus     Resolved: 11/2/2017        Past Surgical History:   Procedure Laterality Date    AUGMENTATION BREAST      CERVICAL BIOPSY  W/ LOOP ELECTRODE EXCISION      CHOLECYSTECTOMY      COLPOSCOPY      FOOT SURGERY      right foot lis franc fracture    RECONSTRUCTION / CORRECTION OF NIPPLE / AEROLA      REVISION OF SCAR N/A 08/01/2022    RHINOPLASTY         Current Outpatient Medications   Medication Sig Dispense Refill    amphetamine-dextroamphetamine (ADDERALL XR) 20 MG 24 hr capsule Take 1 capsule (20 mg total) by mouth daily Max Daily Amount: 20 mg (Patient taking differently: Take 20 mg by mouth if needed) 30 capsule 0    cephalexin (KEFLEX) 500 mg capsule Take 500 mg by mouth every 4 (four) hours      Cloderm 0 1 % cream Apply TO AFFECTED AREA(S) TWICE A DAY FOR flares (Patient not taking: Reported on 8/2/2022)      etonogestrel-ethinyl estradiol (NUVARING) 0 12-0 015 MG/24HR vaginal ring Insert 1 each into the vagina every 30 (thirty) days Insert vaginally and leave in place for 3 consecutive weeks, then remove for 1 week   3 each 3    loratadine (CLARITIN) 10 mg tablet Take 1 tablet (10 mg total) by mouth daily (Patient not taking: Reported on 8/2/2022) 30 tablet 2    oxyCODONE-acetaminophen (PERCOCET) 5-325 mg per tablet Take 1 tablet by mouth every 6 (six) hours as needed (Patient not taking: Reported on 8/2/2022)      phenazopyridine (PYRIDIUM) 200 mg tablet Take 1 tablet (200 mg total) by mouth 3 (three) times a day with meals (Patient not taking: Reported on 8/2/2022) 10 tablet 0    pimecrolimus (ELIDEL) 1 % cream Apply twice daily TO rash  (Patient not taking: Reported on 8/2/2022)      scopolamine (TRANSDERM-SCOP) 1 mg/3 days TD 72 hr patch APPLY BEHIND EAR NIGHT BEFORE SURGERY, REMOVE AFTER 72 HOURS  (Patient not taking: Reported on 8/2/2022)      Soolantra 1 % CREA Apply TO face ONCE daily (Patient not taking: Reported on 8/2/2022)      Sulfacetamide Sodium-Sulfur 10-5 % LIQD [address] face EVERY MORNING (Patient not taking: Reported on 8/2/2022)      valACYclovir (VALTREX) 500 mg tablet Take 1 tablet (500 mg total) by mouth daily 30 tablet 6     No current facility-administered medications for this visit  Allergies   Allergen Reactions    Hydromorphone Hives    Levofloxacin Other (See Comments)     Legs go numb       Review of Systems   Constitutional: Negative for chills and fever  HENT: Negative for ear pain, rhinorrhea and sore throat  Eyes: Negative for pain and visual disturbance  Respiratory: Negative for cough and shortness of breath  Cardiovascular: Negative for chest pain and palpitations  Gastrointestinal: Negative for abdominal pain and vomiting  Genitourinary: Negative for dysuria and hematuria  Musculoskeletal: Negative for arthralgias and back pain  Skin: Negative for color change and rash  Neurological: Negative for seizures and syncope  All other systems reviewed and are negative  Video Exam    There were no vitals filed for this visit  Physical Exam  Constitutional:       Appearance: Normal appearance  HENT:      Head: Normocephalic and atraumatic     Eyes: Conjunctiva/sclera: Conjunctivae normal       Pupils: Pupils are equal, round, and reactive to light  Pulmonary:      Effort: Pulmonary effort is normal  No respiratory distress  Abdominal:      General: Abdomen is flat  Musculoskeletal:      Cervical back: Normal range of motion  Skin:     General: Skin is warm and dry  Findings: No erythema  Neurological:      Mental Status: She is alert and oriented to person, place, and time     Psychiatric:         Behavior: Behavior normal           I spent 15 minutes directly with the patient during this visit

## 2022-08-16 NOTE — ASSESSMENT & PLAN NOTE
Reported sore throat for a day, and this is the third time she had sore throat, possibly strep A pharyngitis this year     She reported she already started amoxicillin for 2 doses   Will order strep throat A test for her   Follow up if symptoms were to get worse  Instruct Denay to contact us if worsens or other concerns

## 2022-08-16 NOTE — TELEPHONE ENCOUNTER
Regarding: Strep throat swab  ----- Message from Yvette Matt sent at 8/16/2022  8:25 AM EDT -----  "I have strep throat and it's ongoing   I need to be swabbed for proof because if I do this 6 times in one year, I can have my tonsils removed "

## 2022-08-16 NOTE — TELEPHONE ENCOUNTER
Patient reports frequent episodes of strep throat this year  ENT prescribed ABX - pt has taken 2 doses already and is requesting a strep test  Virt  appt was made for today  Reason for Disposition   Patient requesting a strep throat test    Answer Assessment - Initial Assessment Questions  1  ONSET: "When did the throat start hurting?" (Hours or days ago)       yesterday 8/15/22  2  SEVERITY: "How bad is the sore throat?" (Scale 1-10; mild, moderate or severe)    - MILD (1-3):  doesn't interfere with eating or normal activities    - MODERATE (4-7): interferes with eating some solids and normal activities    - SEVERE (8-10):  excruciating pain, interferes with most normal activities    - SEVERE DYSPHAGIA: can't swallow liquids, drooling      Moderate - 7  3  STREP EXPOSURE: "Has there been any exposure to strep within the past week?" If Yes, ask: "What type of contact occurred?"       denies  4  VIRAL SYMPTOMS: "Are there any symptoms of a cold, such as a runny nose, cough, hoarse voice or red eyes?"       inflammed throat  5  FEVER: "Do you have a fever?" If Yes, ask: "What is your temperature, how was it measured, and when did it start?"      99 9  -improved with tylenol  6  PUS ON THE TONSILS: "Is there pus on the tonsils in the back of your throat?"      White spots noted  7  OTHER SYMPTOMS: "Do you have any other symptoms?" (e g , difficulty breathing, headache, rash)      denies  8   PREGNANCY: "Is there any chance you are pregnant?" "When was your last menstrual period?"       denies    Protocols used: SORE THROAT-ADULT-OH

## 2022-09-19 DIAGNOSIS — F90.9 ATTENTION DEFICIT HYPERACTIVITY DISORDER (ADHD), UNSPECIFIED ADHD TYPE: ICD-10-CM

## 2022-09-19 RX ORDER — DEXTROAMPHETAMINE SACCHARATE, AMPHETAMINE ASPARTATE MONOHYDRATE, DEXTROAMPHETAMINE SULFATE AND AMPHETAMINE SULFATE 5; 5; 5; 5 MG/1; MG/1; MG/1; MG/1
20 CAPSULE, EXTENDED RELEASE ORAL DAILY
Qty: 30 CAPSULE | Refills: 0 | Status: SHIPPED | OUTPATIENT
Start: 2022-09-19

## 2022-09-21 ENCOUNTER — OFFICE VISIT (OUTPATIENT)
Dept: INTERNAL MEDICINE CLINIC | Facility: CLINIC | Age: 32
End: 2022-09-21
Payer: COMMERCIAL

## 2022-09-21 VITALS
HEIGHT: 64 IN | RESPIRATION RATE: 16 BRPM | DIASTOLIC BLOOD PRESSURE: 76 MMHG | TEMPERATURE: 99.8 F | BODY MASS INDEX: 24.92 KG/M2 | WEIGHT: 146 LBS | SYSTOLIC BLOOD PRESSURE: 128 MMHG | HEART RATE: 84 BPM

## 2022-09-21 DIAGNOSIS — Z00.00 ANNUAL PHYSICAL EXAM: Primary | ICD-10-CM

## 2022-09-21 DIAGNOSIS — R21 RASH: ICD-10-CM

## 2022-09-21 PROCEDURE — 3725F SCREEN DEPRESSION PERFORMED: CPT | Performed by: INTERNAL MEDICINE

## 2022-09-21 PROCEDURE — 99395 PREV VISIT EST AGE 18-39: CPT | Performed by: INTERNAL MEDICINE

## 2022-09-21 NOTE — PROGRESS NOTES
Mini    NAME: Danita Palomino  AGE: 28 y o  SEX: female  : 1990     DATE: 2022     Assessment and Plan:     Problem List Items Addressed This Visit    None     Visit Diagnoses     Annual physical exam    -  Primary    Rash        Relevant Orders    LEBRON Screen w/ Reflex to Titer/Pattern    Sedimentation rate, automated    C-reactive protein          Immunizations and preventive care screenings were discussed with patient today  Appropriate education was printed on patient's after visit summary  Counseling:  Exercise: the importance of regular exercise/physical activity was discussed  Recommend exercise 3-5 times per week for at least 30 minutes  BMI Counseling: Body mass index is 25 06 kg/m²  The BMI is above normal  Nutrition recommendations include encouraging healthy choices of fruits and vegetables  Exercise recommendations include exercising 3-5 times per week  Rationale for BMI follow-up plan is due to patient being overweight or obese  Return in about 1 year (around 2023)  Chief Complaint:     Chief Complaint   Patient presents with    Physical Exam      History of Present Illness:     Adult Annual Physical   Patient here for a comprehensive physical exam  The patient reports problems - "rash" crusty red on the forehead, cheeks x a year with multipel topical agents from derm tried; concerned about lupus  She has photos on her phone to show me    Diet and Physical Activity  Diet/Nutrition: well balanced diet  Exercise: regular  Depression Screening  PHQ-2/9 Depression Screening    Little interest or pleasure in doing things: 0 - not at all  Feeling down, depressed, or hopeless: 0 - not at all  PHQ-2 Score: 0  PHQ-2 Interpretation: Negative depression screen       General Health  Sleep: sleeps poorly     Hearing: normal - bilateral   Vision: goes for regular eye exams and wears contacts  Dental: regular dental visits  /GYN Health  Last menstrual period: regular  Contraceptive method: NUVARING  Review of Systems:     Review of Systems   Constitutional: Negative for fatigue, fever and unexpected weight change  HENT: Negative for hearing loss  Respiratory: Negative for cough and shortness of breath  Cardiovascular: Negative for chest pain and palpitations  Gastrointestinal: Negative for abdominal pain, constipation, diarrhea, nausea and vomiting  Recent abdominoplasty-still with hardening of the abdominal wall, little indentations   Genitourinary: Negative for difficulty urinating  Musculoskeletal: Negative for arthralgias, joint swelling and myalgias  Skin: Positive for rash (face)  Neurological: Negative for dizziness and headaches  Past Medical History:     Past Medical History:   Diagnosis Date    ADHD (attention deficit hyperactivity disorder)     Dysmenorrhea     Last Assessed: 12/22/2015    HSV-2 infection     Postcoital bleeding     Last Assessed: 2/19/2015     Vaginal pruritus     Resolved: 11/2/2017       Past Surgical History:     Past Surgical History:   Procedure Laterality Date    AUGMENTATION BREAST      CERVICAL BIOPSY  W/ LOOP ELECTRODE EXCISION      CHOLECYSTECTOMY      COLPOSCOPY      FOOT SURGERY      right foot lis franc fracture    RECONSTRUCTION / CORRECTION OF NIPPLE / Yeni Argue      REVISION OF SCAR N/A 08/01/2022    RHINOPLASTY        Social History:     Social History     Socioeconomic History    Marital status: Single     Spouse name: None    Number of children: None    Years of education: None    Highest education level: None   Occupational History    None   Tobacco Use    Smoking status: Never Smoker    Smokeless tobacco: Never Used   Vaping Use    Vaping Use: Never used   Substance and Sexual Activity    Alcohol use:  Yes     Alcohol/week: 2 0 standard drinks     Types: 2 Glasses of wine per week    Drug use: No    Sexual activity: Yes     Partners: Male     Birth control/protection: Ring   Other Topics Concern    None   Social History Narrative         Consumes 1-2 cups of coffee per day     Social Determinants of Health     Financial Resource Strain: Not on file   Food Insecurity: Not on file   Transportation Needs: Not on file   Physical Activity: Not on file   Stress: Not on file   Social Connections: Not on file   Intimate Partner Violence: Not on file   Housing Stability: Not on file      Family History:     Family History   Problem Relation Age of Onset    Esophageal cancer Father     Crohn's disease Mother     Diabetes Family     Cancer Family       Current Medications:     Current Outpatient Medications   Medication Sig Dispense Refill    amphetamine-dextroamphetamine (ADDERALL XR) 20 MG 24 hr capsule Take 1 capsule (20 mg total) by mouth daily Max Daily Amount: 20 mg 30 capsule 0    etonogestrel-ethinyl estradiol (NUVARING) 0 12-0 015 MG/24HR vaginal ring Insert 1 each into the vagina every 30 (thirty) days Insert vaginally and leave in place for 3 consecutive weeks, then remove for 1 week  3 each 3    Cloderm 0 1 % cream Apply TO AFFECTED AREA(S) TWICE A DAY FOR flares (Patient not taking: No sig reported)      loratadine (CLARITIN) 10 mg tablet Take 1 tablet (10 mg total) by mouth daily (Patient not taking: No sig reported) 30 tablet 2    oxyCODONE-acetaminophen (PERCOCET) 5-325 mg per tablet Take 1 tablet by mouth every 6 (six) hours as needed (Patient not taking: No sig reported)      phenazopyridine (PYRIDIUM) 200 mg tablet Take 1 tablet (200 mg total) by mouth 3 (three) times a day with meals (Patient not taking: No sig reported) 10 tablet 0    pimecrolimus (ELIDEL) 1 % cream Apply twice daily TO rash  (Patient not taking: No sig reported)      scopolamine (TRANSDERM-SCOP) 1 mg/3 days TD 72 hr patch APPLY BEHIND EAR NIGHT BEFORE SURGERY, REMOVE AFTER 72 HOURS   (Patient not taking: No sig reported)      Soolantra 1 % CREA Apply TO face ONCE daily (Patient not taking: No sig reported)      Sulfacetamide Sodium-Sulfur 10-5 % LIQD WASH face EVERY MORNING (Patient not taking: No sig reported)      valACYclovir (VALTREX) 500 mg tablet Take 1 tablet (500 mg total) by mouth daily 30 tablet 6     No current facility-administered medications for this visit  Allergies: Allergies   Allergen Reactions    Hydromorphone Hives    Levofloxacin Other (See Comments)     Legs go numb      Physical Exam:     /76   Pulse 84   Temp 99 8 °F (37 7 °C)   Resp 16   Ht 5' 4" (1 626 m)   Wt 66 2 kg (146 lb)   BMI 25 06 kg/m²     Physical Exam  Constitutional:       General: She is not in acute distress  Appearance: She is well-developed  She is not ill-appearing, toxic-appearing or diaphoretic  HENT:      Head: Normocephalic and atraumatic  Right Ear: External ear normal  There is no impacted cerumen  Left Ear: External ear normal  There is no impacted cerumen  Eyes:      Conjunctiva/sclera: Conjunctivae normal    Cardiovascular:      Rate and Rhythm: Normal rate and regular rhythm  Heart sounds: Normal heart sounds  No murmur heard  Pulmonary:      Effort: Pulmonary effort is normal  No respiratory distress  Breath sounds: Normal breath sounds  No stridor  No wheezing or rales  Abdominal:      General: There is no distension  Palpations: Abdomen is soft  There is no mass  Tenderness: There is no abdominal tenderness  There is no guarding or rebound  Comments: Hardening of the mid abdominal wall with ecchymosis   Musculoskeletal:      Cervical back: Neck supple  Right lower leg: No edema  Left lower leg: No edema  Skin:     Comments: Rash on face not appreciated bec of makeup    Neurological:      Mental Status: She is alert and oriented to person, place, and time     Psychiatric:         Mood and Affect: Mood normal  Behavior: Behavior normal          Thought Content:  Thought content normal          Judgment: Judgment normal           Josy Couch MD   [facility]

## 2022-09-21 NOTE — PATIENT INSTRUCTIONS

## 2022-09-22 ENCOUNTER — APPOINTMENT (OUTPATIENT)
Dept: LAB | Facility: MEDICAL CENTER | Age: 32
End: 2022-09-22
Payer: COMMERCIAL

## 2022-09-22 DIAGNOSIS — R21 RASH: ICD-10-CM

## 2022-09-22 LAB
CRP SERPL QL: 4.2 MG/L
ERYTHROCYTE [SEDIMENTATION RATE] IN BLOOD: 7 MM/HOUR (ref 0–19)

## 2022-09-22 PROCEDURE — 36415 COLL VENOUS BLD VENIPUNCTURE: CPT

## 2022-09-22 PROCEDURE — 86140 C-REACTIVE PROTEIN: CPT

## 2022-09-22 PROCEDURE — 85652 RBC SED RATE AUTOMATED: CPT

## 2022-09-22 PROCEDURE — 86038 ANTINUCLEAR ANTIBODIES: CPT

## 2022-09-23 LAB — RYE IGE QN: NEGATIVE

## 2022-10-07 DIAGNOSIS — B00.9 HSV (HERPES SIMPLEX VIRUS) INFECTION: ICD-10-CM

## 2022-10-10 RX ORDER — VALACYCLOVIR HYDROCHLORIDE 500 MG/1
500 TABLET, FILM COATED ORAL DAILY
Qty: 90 TABLET | Refills: 2 | Status: SHIPPED | OUTPATIENT
Start: 2022-10-10 | End: 2022-11-09

## 2022-10-12 PROBLEM — J02.0 ACUTE STREPTOCOCCAL PHARYNGITIS: Status: RESOLVED | Noted: 2022-01-31 | Resolved: 2022-10-12

## 2022-10-25 ENCOUNTER — TELEPHONE (OUTPATIENT)
Dept: INTERNAL MEDICINE CLINIC | Facility: CLINIC | Age: 32
End: 2022-10-25

## 2022-10-25 ENCOUNTER — CLINICAL SUPPORT (OUTPATIENT)
Dept: INTERNAL MEDICINE CLINIC | Facility: CLINIC | Age: 32
End: 2022-10-25

## 2022-10-25 DIAGNOSIS — B37.9 YEAST INFECTION: ICD-10-CM

## 2022-10-25 DIAGNOSIS — J02.9 SORE THROAT: Primary | ICD-10-CM

## 2022-10-25 DIAGNOSIS — J02.9 PHARYNGITIS, UNSPECIFIED ETIOLOGY: ICD-10-CM

## 2022-10-25 DIAGNOSIS — J02.9 PHARYNGITIS, UNSPECIFIED ETIOLOGY: Primary | ICD-10-CM

## 2022-10-25 DIAGNOSIS — J03.91 RECURRENT ACUTE TONSILLITIS: ICD-10-CM

## 2022-10-25 LAB — S PYO AG THROAT QL: NEGATIVE

## 2022-10-25 PROCEDURE — 87070 CULTURE OTHR SPECIMN AEROBIC: CPT | Performed by: INTERNAL MEDICINE

## 2022-10-25 PROCEDURE — 87880 STREP A ASSAY W/OPTIC: CPT | Performed by: INTERNAL MEDICINE

## 2022-10-25 RX ORDER — AMOXICILLIN AND CLAVULANATE POTASSIUM 875; 125 MG/1; MG/1
1 TABLET, FILM COATED ORAL EVERY 12 HOURS SCHEDULED
Qty: 14 TABLET | Refills: 0 | Status: SHIPPED | OUTPATIENT
Start: 2022-10-25 | End: 2022-11-01

## 2022-10-25 RX ORDER — FLUCONAZOLE 150 MG/1
150 TABLET ORAL ONCE
Qty: 1 TABLET | Refills: 1 | Status: SHIPPED | OUTPATIENT
Start: 2022-10-25 | End: 2022-10-25

## 2022-10-25 RX ORDER — FLUCONAZOLE 150 MG/1
150 TABLET ORAL ONCE
Qty: 1 TABLET | Refills: 0 | Status: CANCELLED | OUTPATIENT
Start: 2022-10-25 | End: 2022-10-25

## 2022-10-25 RX ORDER — AMOXICILLIN AND CLAVULANATE POTASSIUM 875; 125 MG/1; MG/1
1 TABLET, FILM COATED ORAL EVERY 12 HOURS SCHEDULED
Qty: 20 TABLET | Refills: 0 | Status: CANCELLED | OUTPATIENT
Start: 2022-10-25 | End: 2022-11-04

## 2022-10-25 NOTE — TELEPHONE ENCOUNTER
Scheduled, pt states she had left over antibiotics from having strep last time and took them this weekend, FYI - says this is very frequent for her

## 2022-10-25 NOTE — TELEPHONE ENCOUNTER
Pt came in to be swabbed today for a sore throat  She was negative for rapid strep  Culture sent out  Pt stated she started to take Augmentin for 3 days that she had on hand and is requesting the same antibiotic be called in  She is also requesting Diflucan   Orders created and pending provider approval  Oracio Barajas

## 2022-10-25 NOTE — TELEPHONE ENCOUNTER
Pt believes she has strep throat again, requesting to come by the office so we can do a swab, please advise ty

## 2022-10-27 LAB — BACTERIA THROAT CULT: NORMAL

## 2022-11-01 ENCOUNTER — TELEPHONE (OUTPATIENT)
Dept: GYNECOLOGY | Facility: CLINIC | Age: 32
End: 2022-11-01

## 2022-11-01 DIAGNOSIS — B37.9 YEAST INFECTION: Primary | ICD-10-CM

## 2022-11-01 RX ORDER — FLUCONAZOLE 150 MG/1
150 TABLET ORAL ONCE
Qty: 2 TABLET | Refills: 0 | Status: SHIPPED | OUTPATIENT
Start: 2022-11-01 | End: 2022-11-01

## 2022-11-01 NOTE — TELEPHONE ENCOUNTER
Patient has yeast infection and would like diflucan sent to [facility] on [address]

## 2022-11-08 DIAGNOSIS — B37.9 YEAST INFECTION: Primary | ICD-10-CM

## 2022-11-08 RX ORDER — FLUCONAZOLE 150 MG/1
150 TABLET ORAL ONCE
Qty: 1 TABLET | Refills: 0 | Status: SHIPPED | OUTPATIENT
Start: 2022-11-08 | End: 2022-11-08

## 2022-11-09 DIAGNOSIS — F90.9 ATTENTION DEFICIT HYPERACTIVITY DISORDER (ADHD), UNSPECIFIED ADHD TYPE: ICD-10-CM

## 2022-11-09 RX ORDER — DEXTROAMPHETAMINE SACCHARATE, AMPHETAMINE ASPARTATE MONOHYDRATE, DEXTROAMPHETAMINE SULFATE AND AMPHETAMINE SULFATE 5; 5; 5; 5 MG/1; MG/1; MG/1; MG/1
20 CAPSULE, EXTENDED RELEASE ORAL DAILY
Qty: 30 CAPSULE | Refills: 0 | Status: SHIPPED | OUTPATIENT
Start: 2022-11-09

## 2022-11-10 ENCOUNTER — TELEPHONE (OUTPATIENT)
Dept: GYNECOLOGY | Facility: CLINIC | Age: 32
End: 2022-11-10

## 2022-11-10 ENCOUNTER — APPOINTMENT (OUTPATIENT)
Dept: LAB | Facility: MEDICAL CENTER | Age: 32
End: 2022-11-10

## 2022-11-10 DIAGNOSIS — Z11.3 SCREENING EXAMINATION FOR STD (SEXUALLY TRANSMITTED DISEASE): Primary | ICD-10-CM

## 2022-11-10 DIAGNOSIS — Z11.3 SCREENING EXAMINATION FOR STD (SEXUALLY TRANSMITTED DISEASE): ICD-10-CM

## 2022-11-10 NOTE — PROGRESS NOTES
Maia Argueta called and would like STD testing only GC/CHL  Pt  Denies other STD testing at this time

## 2022-11-11 LAB
C TRACH DNA SPEC QL NAA+PROBE: NEGATIVE
N GONORRHOEA DNA SPEC QL NAA+PROBE: NEGATIVE

## 2022-11-25 ENCOUNTER — TELEMEDICINE (OUTPATIENT)
Dept: INTERNAL MEDICINE CLINIC | Facility: CLINIC | Age: 32
End: 2022-11-25

## 2022-11-25 VITALS — TEMPERATURE: 100.5 F | SYSTOLIC BLOOD PRESSURE: 122 MMHG | HEART RATE: 90 BPM | DIASTOLIC BLOOD PRESSURE: 78 MMHG

## 2022-11-25 DIAGNOSIS — R50.9 FEVER AND CHILLS: ICD-10-CM

## 2022-11-25 DIAGNOSIS — J02.9 EXUDATIVE PHARYNGITIS: Primary | ICD-10-CM

## 2022-11-25 DIAGNOSIS — R05.1 ACUTE COUGH: ICD-10-CM

## 2022-11-25 LAB — S PYO AG THROAT QL: NEGATIVE

## 2022-11-25 RX ORDER — AZITHROMYCIN 250 MG/1
TABLET, FILM COATED ORAL
Qty: 6 TABLET | Refills: 0 | Status: SHIPPED | OUTPATIENT
Start: 2022-11-25 | End: 2022-11-29

## 2022-11-25 NOTE — PROGRESS NOTES
Assessment/Plan:    1  Exudative pharyngitis  -     POCT rapid strepA  -     azithromycin (ZITHROMAX) 250 mg tablet; Take 2 tablets today then 1 tablet daily x 4 days  -     Throat culture; Future  -     Covid/Flu- Office Collect    2  Acute cough  -     Covid/Flu- Office Collect    3  Fever and chills  -     Covid/Flu- Office Collect        The case discussed with patient using patient centered shared decision making  The patient was counseled regarding instructions for management,-- risk factor reductions,-- prognosis,-- impressions,-- risks and benefits of treatment options,-- importance of compliance with treatment  I have reviewed the instructions with the patient, answering all questions to her satisfaction  Cont supportive care per discussion  Will f/u with testing results    There are no Patient Instructions on file for this visit  No follow-ups on file  Subjective:      Patient ID: Kenji Kirk is a 28 y o  female  Chief Complaint   Patient presents with   • Sore Throat     White spots on tonsils; x 1 day   • Cough     Hoarse; phlegm       Sore Throat   This is a new (pt reports frequent strep infections) problem  The current episode started in the past 7 days  The maximum temperature recorded prior to her arrival was 100 4 - 100 9 F  Associated symptoms include congestion, coughing, headaches, a hoarse voice and swollen glands  Pertinent negatives include no abdominal pain, diarrhea, ear discharge, ear pain, plugged ear sensation, neck pain, shortness of breath, trouble swallowing or vomiting  She has had no exposure to strep or mono  Treatments tried: otc cold and flu  The treatment provided mild relief         The following portions of the patient's history were reviewed and updated as appropriate: allergies, current medications, past family history, past medical history, past social history, past surgical history and problem list     Review of Systems   Constitutional: Positive for chills, fatigue and fever  HENT: Positive for congestion, hoarse voice and sore throat  Negative for ear discharge, ear pain and trouble swallowing  Respiratory: Positive for cough  Negative for shortness of breath  Cardiovascular: Negative for chest pain  Gastrointestinal: Negative for abdominal pain, diarrhea and vomiting  Musculoskeletal: Negative for neck pain  Neurological: Positive for headaches  Negative for dizziness and weakness  Current Outpatient Medications   Medication Sig Dispense Refill   • amphetamine-dextroamphetamine (ADDERALL XR) 20 MG 24 hr capsule Take 1 capsule (20 mg total) by mouth daily Max Daily Amount: 20 mg 30 capsule 0   • azithromycin (ZITHROMAX) 250 mg tablet Take 2 tablets today then 1 tablet daily x 4 days 6 tablet 0   • etonogestrel-ethinyl estradiol (NUVARING) 0 12-0 015 MG/24HR vaginal ring Insert 1 each into the vagina every 30 (thirty) days Insert vaginally and leave in place for 3 consecutive weeks, then remove for 1 week  3 each 3   • Cloderm 0 1 % cream Apply TO AFFECTED AREA(S) TWICE A DAY FOR flares (Patient not taking: Reported on 8/2/2022)     • loratadine (CLARITIN) 10 mg tablet Take 1 tablet (10 mg total) by mouth daily (Patient not taking: Reported on 8/2/2022) 30 tablet 2   • oxyCODONE-acetaminophen (PERCOCET) 5-325 mg per tablet Take 1 tablet by mouth every 6 (six) hours as needed (Patient not taking: Reported on 8/2/2022)     • phenazopyridine (PYRIDIUM) 200 mg tablet Take 1 tablet (200 mg total) by mouth 3 (three) times a day with meals (Patient not taking: Reported on 8/2/2022) 10 tablet 0   • pimecrolimus (ELIDEL) 1 % cream Apply twice daily TO rash  (Patient not taking: Reported on 8/2/2022)     • scopolamine (TRANSDERM-SCOP) 1 mg/3 days TD 72 hr patch APPLY BEHIND EAR NIGHT BEFORE SURGERY, REMOVE AFTER 72 HOURS   (Patient not taking: Reported on 8/2/2022)     • Soolantra 1 % CREA Apply TO face ONCE daily (Patient not taking: Reported on 8/2/2022) • Sulfacetamide Sodium-Sulfur 10-5 % LIQD [address] face EVERY MORNING (Patient not taking: Reported on 8/2/2022)     • valACYclovir (VALTREX) 500 mg tablet TAKE 1 TABLET (500 MG TOTAL) BY MOUTH DAILY  90 tablet 2     No current facility-administered medications for this visit  Objective:    /78   Pulse 90   Temp 100 5 °F (38 1 °C)        Physical Exam  Vitals and nursing note reviewed  Constitutional:       General: She is not in acute distress  Appearance: She is well-developed  She is not ill-appearing  HENT:      Right Ear: Tympanic membrane normal       Left Ear: Tympanic membrane normal       Nose: Congestion present  Mouth/Throat:      Mouth: Mucous membranes are moist       Pharynx: Posterior oropharyngeal erythema present  No oropharyngeal exudate or uvula swelling  Tonsils: No tonsillar exudate  1+ on the right  1+ on the left  Cardiovascular:      Rate and Rhythm: Normal rate and regular rhythm  Heart sounds: Normal heart sounds  Pulmonary:      Effort: Pulmonary effort is normal       Breath sounds: Normal breath sounds  Abdominal:      Palpations: Abdomen is soft  Tenderness: There is no abdominal tenderness  Lymphadenopathy:      Cervical: No cervical adenopathy  Neurological:      Mental Status: She is alert                  Vivian Patiño, [address]

## 2022-11-26 LAB
FLUAV RNA RESP QL NAA+PROBE: POSITIVE
FLUBV RNA RESP QL NAA+PROBE: NEGATIVE
SARS-COV-2 RNA RESP QL NAA+PROBE: NEGATIVE

## 2022-11-27 LAB — BACTERIA THROAT CULT: NORMAL

## 2022-12-21 DIAGNOSIS — F90.9 ATTENTION DEFICIT HYPERACTIVITY DISORDER (ADHD), UNSPECIFIED ADHD TYPE: ICD-10-CM

## 2022-12-21 RX ORDER — DEXTROAMPHETAMINE SACCHARATE, AMPHETAMINE ASPARTATE MONOHYDRATE, DEXTROAMPHETAMINE SULFATE AND AMPHETAMINE SULFATE 5; 5; 5; 5 MG/1; MG/1; MG/1; MG/1
20 CAPSULE, EXTENDED RELEASE ORAL DAILY
Qty: 30 CAPSULE | Refills: 0 | Status: SHIPPED | OUTPATIENT
Start: 2022-12-21

## 2023-01-09 NOTE — PROGRESS NOTES
Assessment/Plan:    TVU ordered  Will notify pt of results  Education provided on ovarian cysts and fibroids  Diagnoses and all orders for this visit:    Pelvic pain  -     US pelvis complete w transvaginal; Future        Subjective:      Patient ID: Daphne Flynn is a 28 y o  female  Pt presents for pelvic pain  Neg GC/CT testing 11/10/22  History of genital herpes  S/P LEEP 11/12/21  With normal pap with -HPV, 5/23/22  She states she has "random cramping" in pelvic area R>L  She's concerned because there is a family hx of ovarian cysts and fibroids and she wants an ultrasound to make sure she is not having either  She does not wish to be examined  She is using Nuva Ring  Denies dysuria  Declines STI testing, stating she just had it done and has had no new partners  The following portions of the patient's history were reviewed and updated as appropriate: allergies, current medications, past family history, past medical history, past social history, past surgical history and problem list     Review of Systems   Constitutional: Negative  HENT: Negative  Respiratory: Negative  Cardiovascular: Negative  Gastrointestinal: Negative  Endocrine: Negative  Genitourinary: Positive for pelvic pain  Negative for difficulty urinating, dysuria, frequency, menstrual problem, urgency, vaginal bleeding, vaginal discharge and vaginal pain  Musculoskeletal: Negative  Skin: Negative  Neurological: Negative  Psychiatric/Behavioral: Negative  Objective:      /68 (BP Location: Right arm, Patient Position: Sitting, Cuff Size: Standard)   Ht 5' 4" (1 626 m)   Wt 67 1 kg (148 lb)   LMP 12/27/2022 (Exact Date)   BMI 25 40 kg/m²          Physical Exam  Vitals and nursing note reviewed  Exam conducted with a chaperone present  Constitutional:       Appearance: Normal appearance  HENT:      Head: Normocephalic and atraumatic     Pulmonary:      Effort: Pulmonary effort is normal    Musculoskeletal:         General: Normal range of motion  Cervical back: Normal range of motion  Skin:     General: Skin is warm and dry  Neurological:      Mental Status: She is alert and oriented to person, place, and time  Psychiatric:         Mood and Affect: Mood normal          Behavior: Behavior normal          Thought Content:  Thought content normal          Judgment: Judgment normal

## 2023-01-13 ENCOUNTER — OFFICE VISIT (OUTPATIENT)
Dept: GYNECOLOGY | Facility: CLINIC | Age: 33
End: 2023-01-13

## 2023-01-13 VITALS
DIASTOLIC BLOOD PRESSURE: 68 MMHG | WEIGHT: 148 LBS | HEIGHT: 64 IN | SYSTOLIC BLOOD PRESSURE: 126 MMHG | BODY MASS INDEX: 25.27 KG/M2

## 2023-01-13 DIAGNOSIS — R10.2 PELVIC PAIN: Primary | ICD-10-CM

## 2023-01-16 ENCOUNTER — CONSULT (OUTPATIENT)
Dept: GASTROENTEROLOGY | Facility: AMBULARY SURGERY CENTER | Age: 33
End: 2023-01-16

## 2023-01-16 VITALS
BODY MASS INDEX: 25.33 KG/M2 | WEIGHT: 148.4 LBS | SYSTOLIC BLOOD PRESSURE: 116 MMHG | HEIGHT: 64 IN | OXYGEN SATURATION: 95 % | DIASTOLIC BLOOD PRESSURE: 74 MMHG | HEART RATE: 86 BPM

## 2023-01-16 DIAGNOSIS — K62.5 RECTAL BLEEDING: Primary | ICD-10-CM

## 2023-01-16 DIAGNOSIS — R19.5 LOOSE BOWEL MOVEMENT: ICD-10-CM

## 2023-01-16 RX ORDER — SODIUM PICOSULFATE, MAGNESIUM OXIDE, AND ANHYDROUS CITRIC ACID 10; 3.5; 12 MG/160ML; G/160ML; G/160ML
LIQUID ORAL
Qty: 320 ML | Refills: 0 | Status: SHIPPED | OUTPATIENT
Start: 2023-01-16

## 2023-01-16 NOTE — LETTER
January 16, 2023     Poncho Martinez MD  [address]  [address] [place] [address]    Patient: Shin Nicole   YOB: 1990   Date of Visit: 1/16/2023       Dear Dr Ofelia Myrick: Thank you for referring Liat De Leon to me for evaluation  Below are my notes for this consultation  If you have questions, please do not hesitate to call me  I look forward to following your patient along with you  Sincerely,        Vikas Chin MD        CC: No Recipients  Vikas Chin MD  1/16/2023  8:48 AM  Sign when Signing Visit  Consultation - [address] [facility]  Shin Nicole 28 y o  female MRN: [id number]  Unit/Bed#:  Encounter: [id number]        Consults    ASSESSMENT/PLAN:     1  Loose stools with blood-possibly distal proctitis versus IBS with hemorrhoidal bleeding-patient does have family history of inflammatory bowel disease and was noted to have mild elevation of sed rate on recent blood work  She also has family history of colon cancer in grandmother  -Given elevation of inflammatory marker, loose stools containing blood, family history of inflammatory bowel disease, may be reasonable to proceed withcolonoscopy to rule out inflammatory bowel disease  Other less likely possibilities include colon polyps however hemorrhoids are a possibility   -We did discuss avoiding bearing down, avoiding straining, increasing dietary fiber   - Patient was explained about the risks and benefits of the procedure  Risks including but not limited to bleeding, infection, perforation were explained in detail    ______________________________________________________________________    Reason for Consult / Principal Problem: [unfilled]    HPI: Shin Nicole is a 28y o  year old very pleasant female with history of dysmenorrhea, pelvic pain, presents for evaluation of hematochezia  Patient reports that she has had intermittent rectal bleeding mixed with stool for the past 7 months    She reports that symptoms are typically triggered with loose bowel movements  Patient reports that her bowel habits are also somewhat irregular, reports that she often has loose stools and however thinks that this may be dietary related  She does have family history of Crohn's disease in her mother  She also reports that she has family history of colon cancer in her grandmother at 58  She denies any abdominal pain  She does also notice a bulge from her rectum Which she thinks is a hemorrhoid  Denies constipation  She reports rare symptoms of reflux, denies any hematemesis, coffee emesis or melena  Denies dysphagia  Most recent blood work is notable for normal AST and ALT  Hemoglobin was normal at 14, platelets normal     Review of Systems: The remainder of the review of systems was negative except for the pertinent positives noted in HPI       Historical Information   Past Medical History:   Diagnosis Date   • ADHD (attention deficit hyperactivity disorder)    • Dysmenorrhea     Last Assessed: 12/22/2015   • HSV-2 infection    • Postcoital bleeding     Last Assessed: 2/19/2015    • Vaginal pruritus     Resolved: 11/2/2017      Past Surgical History:   Procedure Laterality Date   • AUGMENTATION BREAST     • CERVICAL BIOPSY  W/ LOOP ELECTRODE EXCISION     • CHOLECYSTECTOMY     • COLPOSCOPY     • FOOT SURGERY      right foot lis franc fracture   • RECONSTRUCTION / CORRECTION OF NIPPLE / Ashley Massed     • REVISION OF SCAR N/A 08/01/2022   • RHINOPLASTY       Social History   Social History     Substance and Sexual Activity   Alcohol Use Yes   • Alcohol/week: 2 0 standard drinks   • Types: 2 Glasses of wine per week     Social History     Substance and Sexual Activity   Drug Use No     Social History     Tobacco Use   Smoking Status Never   Smokeless Tobacco Never     Family History   Problem Relation Age of Onset   • Esophageal cancer Father    • Crohn's disease Mother    • Diabetes Family    • Cancer Family Meds/Allergies      (Not in a hospital admission)    No current facility-administered medications for this visit  Allergies   Allergen Reactions   • Hydromorphone Hives   • Levofloxacin Other (See Comments)     Legs go numb       Objective      Last menstrual period 12/27/2022, not currently breastfeeding  [unfilled]    PHYSICAL EXAM     GEN: well nourished, well developed, no acute distress  HEENT: anicteric, MMM, no cervical or supraclavicular lymphadenopathy  CV: RRR, no m/r/g  CHEST: CTA b/l, no WRR  ABD: +BS, soft, NT/ND, no hepatosplenomegaly  EXT: no c/c/e  SKIN: no rashes,  NEURO: aaox3    Lab Results:   No visits with results within 1 Day(s) from this visit     Latest known visit with results is:   Telemedicine on 11/25/2022   Component Date Value   •  RAPID STREP A 11/25/2022 Negative    • SARS-CoV-2 11/25/2022 Negative    • INFLUENZA A PCR 11/25/2022 Positive (A)    • INFLUENZA B PCR 11/25/2022 Negative    • Throat Culture 11/25/2022 Negative for beta-hemolytic Streptococcus      Imaging Studies: I have personally reviewed pertinent films in PACS

## 2023-01-16 NOTE — PROGRESS NOTES
Consultation - [facility]  Sarah Platt 28 y o  female MRN: [id number]  Unit/Bed#:  Encounter: [id number]        Consults    ASSESSMENT/PLAN:     1  Loose stools with blood-possibly distal proctitis versus IBS with hemorrhoidal bleeding-patient does have family history of inflammatory bowel disease and was noted to have mild elevation of sed rate on recent blood work  She also has family history of colon cancer in grandmother  -Given elevation of inflammatory marker, loose stools containing blood, family history of inflammatory bowel disease, may be reasonable to proceed withcolonoscopy to rule out inflammatory bowel disease  Other less likely possibilities include colon polyps however hemorrhoids are a possibility   -We did discuss avoiding bearing down, avoiding straining, increasing dietary fiber   - Patient was explained about the risks and benefits of the procedure  Risks including but not limited to bleeding, infection, perforation were explained in detail    ______________________________________________________________________    Reason for Consult / Principal Problem: [unfilled]    HPI: Sarah Platt is a 28y o  year old very pleasant female with history of dysmenorrhea, pelvic pain, presents for evaluation of hematochezia  Patient reports that she has had intermittent rectal bleeding mixed with stool for the past 7 months  She reports that symptoms are typically triggered with loose bowel movements  Patient reports that her bowel habits are also somewhat irregular, reports that she often has loose stools and however thinks that this may be dietary related  She does have family history of Crohn's disease in her mother  She also reports that she has family history of colon cancer in her grandmother at 58  She denies any abdominal pain  She does also notice a bulge from her rectum Which she thinks is a hemorrhoid  Denies constipation    She reports rare symptoms of reflux, denies any hematemesis, coffee emesis or melena  Denies dysphagia  Most recent blood work is notable for normal AST and ALT  Hemoglobin was normal at 14, platelets normal     Review of Systems: The remainder of the review of systems was negative except for the pertinent positives noted in HPI  Historical Information   Past Medical History:   Diagnosis Date   • ADHD (attention deficit hyperactivity disorder)    • Dysmenorrhea     Last Assessed: 12/22/2015   • HSV-2 infection    • Postcoital bleeding     Last Assessed: 2/19/2015    • Vaginal pruritus     Resolved: 11/2/2017      Past Surgical History:   Procedure Laterality Date   • AUGMENTATION BREAST     • CERVICAL BIOPSY  W/ LOOP ELECTRODE EXCISION     • CHOLECYSTECTOMY     • COLPOSCOPY     • FOOT SURGERY      right foot lis franc fracture   • RECONSTRUCTION / CORRECTION OF NIPPLE / Chris Ramming     • REVISION OF SCAR N/A 08/01/2022   • RHINOPLASTY       Social History   Social History     Substance and Sexual Activity   Alcohol Use Yes   • Alcohol/week: 2 0 standard drinks   • Types: 2 Glasses of wine per week     Social History     Substance and Sexual Activity   Drug Use No     Social History     Tobacco Use   Smoking Status Never   Smokeless Tobacco Never     Family History   Problem Relation Age of Onset   • Esophageal cancer Father    • Crohn's disease Mother    • Diabetes Family    • Cancer Family        Meds/Allergies     (Not in a hospital admission)    No current facility-administered medications for this visit  Allergies   Allergen Reactions   • Hydromorphone Hives   • Levofloxacin Other (See Comments)     Legs go numb       Objective     Last menstrual period 12/27/2022, not currently breastfeeding      [unfilled]    PHYSICAL EXAM     GEN: well nourished, well developed, no acute distress  HEENT: anicteric, MMM, no cervical or supraclavicular lymphadenopathy  CV: RRR, no m/r/g  CHEST: CTA b/l, no WRR  ABD: +BS, soft, NT/ND, no hepatosplenomegaly  EXT: no c/c/e  SKIN: no rashes,  NEURO: aaox3    Lab Results:   No visits with results within 1 Day(s) from this visit     Latest known visit with results is:   Telemedicine on 11/25/2022   Component Date Value   •  RAPID STREP A 11/25/2022 Negative    • SARS-CoV-2 11/25/2022 Negative    • INFLUENZA A PCR 11/25/2022 Positive (A)    • INFLUENZA B PCR 11/25/2022 Negative    • Throat Culture 11/25/2022 Negative for beta-hemolytic Streptococcus      Imaging Studies: I have personally reviewed pertinent films in PACS

## 2023-01-16 NOTE — PATIENT INSTRUCTIONS
Scheduled date of colonoscopy (as of today):3/29/2023  Physician performing colonoscopy:DR MALLORY  Location of colonoscopy:[facility]  Bowel prep reviewed with patient:CLENPIQ 1ST CHOICE IF NOT COVERED THEN MIRALAX/DULCOLAX PER DR LIND  Instructions reviewed with patient by:RU BAXTER  Clearances:

## 2023-01-25 ENCOUNTER — HOSPITAL ENCOUNTER (OUTPATIENT)
Dept: ULTRASOUND IMAGING | Facility: MEDICAL CENTER | Age: 33
Discharge: HOME | End: 2023-01-25

## 2023-01-25 DIAGNOSIS — R10.2 PELVIC PAIN: ICD-10-CM

## 2023-01-30 ENCOUNTER — TELEPHONE (OUTPATIENT)
Dept: GYNECOLOGY | Facility: CLINIC | Age: 33
End: 2023-01-30

## 2023-01-30 NOTE — TELEPHONE ENCOUNTER
Pt made aware of normal TVU results  Will continue to monitor cramping  Advised to monitor and follow up with PCP to r/o GI etiology

## 2023-01-31 DIAGNOSIS — F90.9 ATTENTION DEFICIT HYPERACTIVITY DISORDER (ADHD), UNSPECIFIED ADHD TYPE: ICD-10-CM

## 2023-01-31 RX ORDER — DEXTROAMPHETAMINE SACCHARATE, AMPHETAMINE ASPARTATE MONOHYDRATE, DEXTROAMPHETAMINE SULFATE AND AMPHETAMINE SULFATE 5; 5; 5; 5 MG/1; MG/1; MG/1; MG/1
20 CAPSULE, EXTENDED RELEASE ORAL DAILY
Qty: 30 CAPSULE | Refills: 0 | Status: SHIPPED | OUTPATIENT
Start: 2023-01-31

## 2023-02-02 ENCOUNTER — TELEPHONE (OUTPATIENT)
Dept: OTHER | Facility: OTHER | Age: 33
End: 2023-02-02

## 2023-02-02 NOTE — TELEPHONE ENCOUNTER
Spoke with patient, reports external hemorrhoids with small amount of bright red blood when wiping  She has rectal discomfort/ burning pain when having BMs for the last 3 weeks  BMs are loose, no straining  She tried OTC prep H, witch hazel pads- which burn the area, and rectal creams OTC  She is asking if there is a prescription medication we can send instead to help heal the area  Recs:   Sitz baths   Keep area clean   If provider agrees Anusol rectal cream - looks like it may be covered with her insurance

## 2023-02-02 NOTE — TELEPHONE ENCOUNTER
Patient stated she was seen on 1/16/2023  Patient stated she is still experiencing rectal bleeding and is requesting a call back for care advice

## 2023-02-02 NOTE — TELEPHONE ENCOUNTER
Called and spoke to patient  She reports burning with over-the-counter Preparation H/witch hazel pads as well as when urinating  She reports possibly noticing a tear between the anus and vagina  Symptoms seem more consistent with a possible fissure therefore would recommend holding off on steroid cream at this time  For now, would recommend sitz bath with warm water 1-2 times a day for 10 to 15 minutes  Would recommend avoiding over-the-counter products that appear to be irritating the area more  She is requesting office visit tomorrow for this to be evaluated  I discussed that there is no availabilities with [place] or Dr Jerri Connolly however appears there may be some next week in [place] office  She is agreeable to go there if covered by insurance  I sent a message to Christine Nelson to call patient and set this up

## 2023-02-06 ENCOUNTER — CLINICAL SUPPORT (OUTPATIENT)
Dept: INTERNAL MEDICINE CLINIC | Facility: CLINIC | Age: 33
End: 2023-02-06

## 2023-02-06 ENCOUNTER — TELEPHONE (OUTPATIENT)
Dept: INTERNAL MEDICINE CLINIC | Facility: CLINIC | Age: 33
End: 2023-02-06

## 2023-02-06 ENCOUNTER — TELEMEDICINE (OUTPATIENT)
Dept: INTERNAL MEDICINE CLINIC | Facility: CLINIC | Age: 33
End: 2023-02-06

## 2023-02-06 VITALS — TEMPERATURE: 97.9 F | BODY MASS INDEX: 24.75 KG/M2 | HEIGHT: 64 IN | WEIGHT: 145 LBS

## 2023-02-06 DIAGNOSIS — J02.9 SORE THROAT: Primary | ICD-10-CM

## 2023-02-06 DIAGNOSIS — J02.9 PHARYNGITIS, UNSPECIFIED ETIOLOGY: Primary | ICD-10-CM

## 2023-02-06 DIAGNOSIS — N76.0 ACUTE VAGINITIS: ICD-10-CM

## 2023-02-06 DIAGNOSIS — J03.01 RECURRENT STREPTOCOCCAL TONSILLITIS: ICD-10-CM

## 2023-02-06 DIAGNOSIS — J02.0 STREP THROAT: Primary | ICD-10-CM

## 2023-02-06 LAB — S PYO AG THROAT QL: POSITIVE

## 2023-02-06 RX ORDER — AMOXICILLIN 500 MG/1
500 CAPSULE ORAL EVERY 8 HOURS SCHEDULED
Qty: 30 CAPSULE | Refills: 0 | Status: SHIPPED | OUTPATIENT
Start: 2023-02-06 | End: 2023-02-16

## 2023-02-06 RX ORDER — FLUCONAZOLE 150 MG/1
150 TABLET ORAL ONCE
Qty: 1 TABLET | Refills: 0 | Status: SHIPPED | OUTPATIENT
Start: 2023-02-06 | End: 2023-02-06

## 2023-02-06 NOTE — TELEPHONE ENCOUNTER
Started on Tuesday 1/31 with sore throat and it is still very painful wanted to know if she can come in for swab for strep? She reports it's not covid did a home swab and she had flu already   Please advise

## 2023-02-06 NOTE — TELEPHONE ENCOUNTER
Pt added to nurse schedule for 1030 and virtual with you this afternoon, would not schedule with anyone else and was addiment about having strep testing done ASAP as she gets strep frequently and did not want to wait until the afternoon - please enter order

## 2023-02-06 NOTE — PROGRESS NOTES
Virtual Regular Visit    Verification of patient location:    Patient is located in the following state in which I hold an active license [place]      Assessment/Plan:    Problem List Items Addressed This Visit    None  Visit Diagnoses     Strep throat    -  Primary    Relevant Medications    amoxicillin (AMOXIL) 500 mg capsule    Recurrent streptococcal tonsillitis        Relevant Medications    amoxicillin (AMOXIL) 500 mg capsule    Acute vaginitis        Relevant Medications    fluconazole (DIFLUCAN) 150 mg tablet               Reason for visit is   Chief Complaint   Patient presents with   • Sore Throat     Tested positive for strep-sore throat, runny nose, cough-for 1 week  Covid test negative   • Virtual Regular Visit        Encounter provider Malena Long MD    Provider located at [address] [place] [id number]      Recent Visits  No visits were found meeting these conditions  Showing recent visits within past 7 days and meeting all other requirements  Today's Visits  Date Type Provider Dept   02/06/23 Telemedicine Malena Long MD [place] [id number]   02/06/23 Telephone Malena Long MD [facility] today's visits and meeting all other requirements  Future Appointments  No visits were found meeting these conditions  Showing future appointments within next 150 days and meeting all other requirements   It was my intent to perform this visit via video technology but the patient was not able to do a video connection so the visit was completed via audio telephone only  The patient was identified by name and date of birth  Aris San was informed that this is a telemedicine visit and that the visit is being conducted through Telephone  My office door was closed  No one else was in the room  She acknowledged consent and understanding of privacy and security of the video platform   The patient has agreed to participate and understands they can discontinue the visit at any time  Patient is aware this is a billable service  Subjective  Lyubov Escalante is a 28 y o  female strep throat   HPI   A week of URI symptoms but the sore throat got extremely worse over the past 2-3 days  Rapid strep earlier today +  +recurrent strep throat  She has amox clav at home and took one dose today after the test came back +  Made appt with ENT Dr Veena Oh to discuss tonsillectomy    Past Medical History:   Diagnosis Date   • ADHD (attention deficit hyperactivity disorder)    • Dysmenorrhea     Last Assessed: 12/22/2015   • HSV-2 infection    • Postcoital bleeding     Last Assessed: 2/19/2015    • Vaginal pruritus     Resolved: 11/2/2017        Past Surgical History:   Procedure Laterality Date   • AUGMENTATION BREAST     • CERVICAL BIOPSY  W/ LOOP ELECTRODE EXCISION     • CHOLECYSTECTOMY     • COLPOSCOPY     • FOOT SURGERY      right foot lis franc fracture   • RECONSTRUCTION / CORRECTION OF NIPPLE / AEROLA     • REVISION OF SCAR N/A 08/01/2022   • RHINOPLASTY         Current Outpatient Medications   Medication Sig Dispense Refill   • amoxicillin (AMOXIL) 500 mg capsule Take 1 capsule (500 mg total) by mouth every 8 (eight) hours for 10 days 30 capsule 0   • amphetamine-dextroamphetamine (ADDERALL XR) 20 MG 24 hr capsule Take 1 capsule (20 mg total) by mouth daily Max Daily Amount: 20 mg 30 capsule 0   • etonogestrel-ethinyl estradiol (NUVARING) 0 12-0 015 MG/24HR vaginal ring Insert 1 each into the vagina every 30 (thirty) days Insert vaginally and leave in place for 3 consecutive weeks, then remove for 1 week  3 each 3   • fluconazole (DIFLUCAN) 150 mg tablet Take 1 tablet (150 mg total) by mouth once for 1 dose 1 tablet 0   • sodium picosulfate, magnesium oxide, citric acid (Clenpiq) 10-3 5-12 MG-GM -GM/160ML SOLN Follow instructions as given during the office   320 mL 0   • Cloderm 0 1 % cream Apply TO AFFECTED AREA(S) TWICE A DAY FOR flares (Patient not taking: Reported on 8/2/2022)     • loratadine (CLARITIN) 10 mg tablet Take 1 tablet (10 mg total) by mouth daily (Patient not taking: Reported on 8/2/2022) 30 tablet 2   • oxyCODONE-acetaminophen (PERCOCET) 5-325 mg per tablet Take 1 tablet by mouth every 6 (six) hours as needed (Patient not taking: Reported on 8/2/2022)     • phenazopyridine (PYRIDIUM) 200 mg tablet Take 1 tablet (200 mg total) by mouth 3 (three) times a day with meals (Patient not taking: Reported on 8/2/2022) 10 tablet 0   • pimecrolimus (ELIDEL) 1 % cream Apply twice daily TO rash  (Patient not taking: Reported on 8/2/2022)     • scopolamine (TRANSDERM-SCOP) 1 mg/3 days TD 72 hr patch APPLY BEHIND EAR NIGHT BEFORE SURGERY, REMOVE AFTER 72 HOURS  (Patient not taking: Reported on 8/2/2022)     • Soolantra 1 % CREA Apply TO face ONCE daily (Patient not taking: Reported on 8/2/2022)     • Sulfacetamide Sodium-Sulfur 10-5 % [facility]D [facility] face EVERY MORNING (Patient not taking: Reported on 8/2/2022)     • valACYclovir (VALTREX) 500 mg tablet TAKE 1 TABLET (500 MG TOTAL) BY MOUTH DAILY  90 tablet 2     No current facility-administered medications for this visit  Allergies   Allergen Reactions   • Hydromorphone Hives   • Levofloxacin Other (See Comments)     Legs go numb       Review of Systems   Constitutional: Positive for chills and fever  HENT: Positive for rhinorrhea and sore throat  Respiratory: Positive for cough          Video Exam    Vitals:    02/06/23 1546   Temp: 97 9 °F (36 6 °C)   TempSrc: Oral   Weight: 65 8 kg (145 lb)   Height: 5' 4" (1 626 m)       Physical Exam     I spent 15 minutes directly with the patient during this visit

## 2023-02-08 ENCOUNTER — TELEPHONE (OUTPATIENT)
Dept: GASTROENTEROLOGY | Facility: CLINIC | Age: 33
End: 2023-02-08

## 2023-02-08 ENCOUNTER — OFFICE VISIT (OUTPATIENT)
Dept: GASTROENTEROLOGY | Facility: CLINIC | Age: 33
End: 2023-02-08

## 2023-02-08 VITALS
WEIGHT: 148 LBS | SYSTOLIC BLOOD PRESSURE: 121 MMHG | HEART RATE: 78 BPM | BODY MASS INDEX: 25.27 KG/M2 | OXYGEN SATURATION: 91 % | HEIGHT: 64 IN | DIASTOLIC BLOOD PRESSURE: 81 MMHG

## 2023-02-08 DIAGNOSIS — K60.2 ANAL FISSURE: Primary | ICD-10-CM

## 2023-02-08 RX ORDER — FLUCONAZOLE 150 MG/1
TABLET ORAL
COMMUNITY
Start: 2023-02-06

## 2023-02-08 NOTE — TELEPHONE ENCOUNTER
Insurance asking for more clinicals, faxed 28 pages to Prior Authorization Request Form for Compound Medications, [facility]       [phone] fax

## 2023-02-08 NOTE — TELEPHONE ENCOUNTER
Patients GI provider:  BOBBY Vazquez    Number to return call: [phone]    Reason for call: Pt calling stating rectal ointment sent into pharmacy is not covered by insurance  She would like a call back      Scheduled procedure/appointment date if applicable: proc 4/66

## 2023-02-08 NOTE — PROGRESS NOTES
Assessment and Plan    #1  Anal fissure: patient has a posterior anal fissure about 3 cm in size and an anterior anal fissure about 1 cm in size, started a few days after last office visit, burning with BM and also itching constantly  Has his loose stools and bleeding with family hx Crohn's and is scheduled for colonoscopy next month  -start nifedipine-lidocaine ointment at least 1-2 times daily but can use up to 4 as needed  Sent to [facility]  -discussed SE of headache, dizziness, lightheadedness and low BP  -will reassess at colonoscopy next month    --------------------------------------------------------------------------------------------------------------------    Chief Complaint: anal itching/burning    HPI: Lobo Myers is a 28 y o  female with a history of ADHD, chronic soft stools, family history of Crohn's who presents today for follow up for anal itching and burning  She is not sure if it is a hemorrhoid however based on the description it was concerning for a fissure and she was prompted to come to the office for further examination prior to prescribing medications  She reports that a few days after seeing Dr Diamond Dimas in the office last month she began to have issues with anal itching as well as burning  She has tried many over-the-counter remedies which have not helped  She tried doing Preparation H as well as witch hazel pads which increased the burning  She reports that her bowel movements typically are 2-3 times daily and are soft  She has had bleeding in the past but none in the last several weeks  She reports that it typically worsens in regards to burning after bowel movements but is constantly itchy  She is scheduled to have a colonoscopy next month to rule out Crohn's disease due to her family history and chronic loose stools        Review of Systems:   General: negative for fatigue, fever, night sweats or unexpected weight loss  Psychological: negative for anxiety or depression  Ophthalmic: negative for blurry vision or scleral icterus  ENT: negative for headaches, oral lesions, sore throat, vocal changes or dysphagia  Hematological and Lymphatic: negative for pallor or swollen lymph nodes  Respiratory: negative for cough, shortness of breath or wheezing  Cardiovascular: negative for chest pain, edema or murmur  Gastrointestinal: as mentioned in HPI  Genito-Urinary: negative for dysuria or incontinence  Musculoskeletal: negative for joint pain, joint stiffness or joint swelling  Dermatological: negative for pruritus, rash, or jaundice    Current Medications  Current Outpatient Medications   Medication Sig Dispense Refill   • amoxicillin (AMOXIL) 500 mg capsule Take 1 capsule (500 mg total) by mouth every 8 (eight) hours for 10 days 30 capsule 0   • amphetamine-dextroamphetamine (ADDERALL XR) 20 MG 24 hr capsule Take 1 capsule (20 mg total) by mouth daily Max Daily Amount: 20 mg 30 capsule 0   • etonogestrel-ethinyl estradiol (NUVARING) 0 12-0 015 MG/24HR vaginal ring Insert 1 each into the vagina every 30 (thirty) days Insert vaginally and leave in place for 3 consecutive weeks, then remove for 1 week  3 each 3   • fluconazole (DIFLUCAN) 150 mg tablet      • NIFEdipine 0 3%-lidocaine 5% rectal ointment Apply a pea sized amount to the anal fissure as needed every 6 hours for pain and itching 30 g 1     No current facility-administered medications for this visit         Past Medical History  Past Medical History:   Diagnosis Date   • ADHD (attention deficit hyperactivity disorder)    • Dysmenorrhea     Last Assessed: 12/22/2015   • HSV-2 infection    • Postcoital bleeding     Last Assessed: 2/19/2015    • Vaginal pruritus     Resolved: 11/2/2017        Past Surgical History  Past Surgical History:   Procedure Laterality Date   • AUGMENTATION BREAST     • CERVICAL BIOPSY  W/ LOOP ELECTRODE EXCISION     • CHOLECYSTECTOMY     • COLPOSCOPY     • FOOT SURGERY      right foot lis franc fracture   • RECONSTRUCTION / CORRECTION OF NIPPLE / Major Leak     • REVISION OF SCAR N/A 08/01/2022   • RHINOPLASTY         Past Social History   Social History     Socioeconomic History   • Marital status: Single     Spouse name: None   • Number of children: None   • Years of education: None   • Highest education level: None   Occupational History   • None   Tobacco Use   • Smoking status: Never   • Smokeless tobacco: Never   Vaping Use   • Vaping Use: Never used   Substance and Sexual Activity   • Alcohol use: Yes     Alcohol/week: 2 0 standard drinks     Types: 2 Glasses of wine per week   • Drug use: No   • Sexual activity: Yes     Partners: Male     Birth control/protection: Ring   Other Topics Concern   • None   Social History Narrative         Consumes 1-2 cups of coffee per day     Social Determinants of Health     Financial Resource Strain: Not on file   Food Insecurity: Not on file   Transportation Needs: Not on file   Physical Activity: Not on file   Stress: Not on file   Social Connections: Not on file   Intimate Partner Violence: Not on file   Housing Stability: Not on file       The following portions of the patient's history were reviewed and updated as appropriate: allergies, current medications, past family history, past medical history, past social history, past surgical history and problem list     Vital Signs  Vitals:    02/08/23 0814   BP: 121/81   BP Location: Right arm   Patient Position: Sitting   Cuff Size: Standard   Pulse: 78   SpO2: 91%   Weight: 67 1 kg (148 lb)   Height: 5' 4" (1 626 m)       Physical Exam:  General appearance: alert, cooperative, no distress  HEENT: normocephalic, anicteric, no eye erythema or discharge  Neck: supple, trachea midline,  Rectal: performed with Ana JAMA in room, patient noted to have a 3 cm fissure posteriorly in the 6 oclock position extending from the anus to the perineal skin   She has a second fissure approx 1 cm in size in the 12 oclock position extended from anus to perineal skin  Extremities: no cyanosis, clubbing, or edema  Musculoskeletal: normal gait  Skin: color and texture normal, no jaundice, no rashes or lesions  Psychiatric: alert and oriented, normal affect and behavior

## 2023-02-08 NOTE — TELEPHONE ENCOUNTER
Patient is requesting a call back from the office regarding her request for her medication that needs prior authorization  She asking for the office to just sent the script over to her pharmacy [facility] and she will pay out of pocket if she has too

## 2023-02-08 NOTE — TELEPHONE ENCOUNTER
Just seeing this now, as it's only been two hours ago, and there are other things I've been working on  Will start this now and call pt to let her know of status once I get a status

## 2023-02-08 NOTE — TELEPHONE ENCOUNTER
(Pedraza: Thom Simmons)  Started for Compounded NIFEdipine-Lidocaine rectal ointment  Given 1-5 business days for response  Left VM for pt with that response time expectation

## 2023-02-09 DIAGNOSIS — K60.2 ANAL FISSURE: ICD-10-CM

## 2023-02-09 NOTE — TELEPHONE ENCOUNTER
Will call pt to let her know that that is the pharmacy that it was called to originally and she can probably just pay out of pocket for it, she'll have to check with the pharmacy on cost

## 2023-02-09 NOTE — TELEPHONE ENCOUNTER
Called back pt, apologized, explained that I am also rooming and doing pt care today in between phone calls  Advised med was called to [facility] and I am still working on authorization  Sent more clinicals this morning

## 2023-03-02 ENCOUNTER — TELEPHONE (OUTPATIENT)
Dept: INTERNAL MEDICINE CLINIC | Facility: CLINIC | Age: 33
End: 2023-03-02

## 2023-03-02 ENCOUNTER — TELEMEDICINE (OUTPATIENT)
Dept: INTERNAL MEDICINE CLINIC | Facility: CLINIC | Age: 33
End: 2023-03-02

## 2023-03-02 DIAGNOSIS — F51.04 PSYCHOPHYSIOLOGICAL INSOMNIA: Primary | ICD-10-CM

## 2023-03-02 DIAGNOSIS — F90.0 ATTENTION DEFICIT HYPERACTIVITY DISORDER (ADHD), PREDOMINANTLY INATTENTIVE TYPE: ICD-10-CM

## 2023-03-02 DIAGNOSIS — F90.0 ATTENTION DEFICIT HYPERACTIVITY DISORDER (ADHD), PREDOMINANTLY INATTENTIVE TYPE: Primary | ICD-10-CM

## 2023-03-02 RX ORDER — LORAZEPAM 0.5 MG/1
0.5 TABLET ORAL
Qty: 10 TABLET | Refills: 0 | Status: SHIPPED | OUTPATIENT
Start: 2023-03-02

## 2023-03-02 RX ORDER — DEXTROAMPHETAMINE SACCHARATE, AMPHETAMINE ASPARTATE, DEXTROAMPHETAMINE SULFATE AND AMPHETAMINE SULFATE 5; 5; 5; 5 MG/1; MG/1; MG/1; MG/1
20 TABLET ORAL DAILY
Qty: 30 TABLET | Refills: 0 | Status: SHIPPED | OUTPATIENT
Start: 2023-03-02 | End: 2023-04-27 | Stop reason: SDUPTHER

## 2023-03-02 NOTE — TELEPHONE ENCOUNTER
Pt calling in regarding her adderall, says the attached pharmacy only has the IR 20mg, requesting you send a script for the 20mg IR to the [address] [place] [address] please

## 2023-03-02 NOTE — PROGRESS NOTES
Virtual Regular Visit    Verification of patient location:    Patient is located in the following state in which I hold an active license [place]      Assessment/Plan:  Limited short term use of lorazepam  Instructed to not take with alcohol and only for sleep PRN  Problem List Items Addressed This Visit        Other    Attention-deficit/hyperactivity disorder    Relevant Medications    LORazepam (ATIVAN) 0 5 mg tablet   Other Visit Diagnoses     Psychophysiological insomnia    -  Primary    Relevant Medications    LORazepam (ATIVAN) 0 5 mg tablet               Reason for visit is   Chief Complaint   Patient presents with   • Virtual Regular Visit        Encounter provider Burke Rolon MD    Provider located at [address] [place] [id number]      Recent Visits  No visits were found meeting these conditions  Showing recent visits within past 7 days and meeting all other requirements  Today's Visits  Date Type Provider Dept   03/02/23 Telemedicine Burke Rolon MD [facility] today's visits and meeting all other requirements  Future Appointments  No visits were found meeting these conditions  Showing future appointments within next 150 days and meeting all other requirements     It was my intent to perform this visit via video technology but the patient was not able to do a video connection so the visit was completed via audio telephone only  The patient was identified by name and date of birth  Alex Bergman was informed that this is a telemedicine visit and that the visit is being conducted through Telephone  My office door was closed  No one else was in the room  She acknowledged consent and understanding of privacy and security of the video platform  The patient has agreed to participate and understands they can discontinue the visit at any time  Patient is aware this is a billable service       Subjective Denay D Aline Boxer is a 28 y o  female with trouble sleeping and anxiety   HPI   Reports trouble sleeping and anxiety over the past 2 weeks  Ave 5-6 hours making it difficult to work  She was arrested for a DUI 2 weeks ago and waiting for result of the BAL  Denies regular heavy alcohol use  She drinks socially and had 6-7 drinks that night she was pulled over and arrested  She is on Adderall XR 20mg which was backordered for a while so taking it sparingly  She has it now  Past Medical History:   Diagnosis Date   • ADHD (attention deficit hyperactivity disorder)    • Dysmenorrhea     Last Assessed: 12/22/2015   • HSV-2 infection    • Postcoital bleeding     Last Assessed: 2/19/2015    • Vaginal pruritus     Resolved: 11/2/2017        Past Surgical History:   Procedure Laterality Date   • AUGMENTATION BREAST     • CERVICAL BIOPSY  W/ LOOP ELECTRODE EXCISION     • CHOLECYSTECTOMY     • COLPOSCOPY     • FOOT SURGERY      right foot lis franc fracture   • RECONSTRUCTION / CORRECTION OF NIPPLE / AEROLA     • REVISION OF SCAR N/A 08/01/2022   • RHINOPLASTY         Current Outpatient Medications   Medication Sig Dispense Refill   • LORazepam (ATIVAN) 0 5 mg tablet Take 1 tablet (0 5 mg total) by mouth daily at bedtime as needed for sedation 10 tablet 0   • amphetamine-dextroamphetamine (ADDERALL XR) 20 MG 24 hr capsule Take 1 capsule (20 mg total) by mouth daily Max Daily Amount: 20 mg 30 capsule 0   • etonogestrel-ethinyl estradiol (NUVARING) 0 12-0 015 MG/24HR vaginal ring Insert 1 each into the vagina every 30 (thirty) days Insert vaginally and leave in place for 3 consecutive weeks, then remove for 1 week  3 each 3   • fluconazole (DIFLUCAN) 150 mg tablet      • NIFEdipine 0 3%-lidocaine 5% rectal ointment Apply a pea sized amount to the anal fissure as needed every 6 hours for pain and itching 30 g 1     No current facility-administered medications for this visit          Allergies   Allergen Reactions   • Hydromorphone Hives   • Levofloxacin Other (See Comments)     Legs go numb       Review of Systems    Video Exam    There were no vitals filed for this visit      Physical Exam     I spent 15 minutes directly with the patient during this visit

## 2023-03-16 ENCOUNTER — TELEPHONE (OUTPATIENT)
Dept: OTHER | Facility: OTHER | Age: 33
End: 2023-03-16

## 2023-03-16 DIAGNOSIS — K60.2 ANAL FISSURE: Primary | ICD-10-CM

## 2023-03-16 RX ORDER — HYDROCORTISONE 25 MG/G
CREAM TOPICAL 2 TIMES DAILY
Qty: 28 G | Refills: 1 | Status: SHIPPED | OUTPATIENT
Start: 2023-03-16 | End: 2023-04-27 | Stop reason: SDUPTHER

## 2023-03-16 NOTE — TELEPHONE ENCOUNTER
Spoke with patient, she request a message be sent to [facility]angle[facility] asking for an alterative to the prescribe compound  We are still waiting an approval from insurance company  Last appeal sent late February  She can not afford the compound with insurance

## 2023-03-16 NOTE — TELEPHONE ENCOUNTER
Pt would like a call back to schedule an apt with [facility]  Pt would not tell me the reason for the apt  Please call

## 2023-03-28 ENCOUNTER — TELEPHONE (OUTPATIENT)
Dept: OTHER | Facility: OTHER | Age: 33
End: 2023-03-28

## 2023-03-28 RX ORDER — SODIUM CHLORIDE, SODIUM LACTATE, POTASSIUM CHLORIDE, CALCIUM CHLORIDE 600; 310; 30; 20 MG/100ML; MG/100ML; MG/100ML; MG/100ML
125 INJECTION, SOLUTION INTRAVENOUS CONTINUOUS
Status: CANCELLED | OUTPATIENT
Start: 2023-03-28

## 2023-03-28 RX ORDER — LIDOCAINE HYDROCHLORIDE 10 MG/ML
0.5 INJECTION, SOLUTION EPIDURAL; INFILTRATION; INTRACAUDAL; PERINEURAL ONCE AS NEEDED
Status: CANCELLED | OUTPATIENT
Start: 2023-03-28

## 2023-03-28 NOTE — TELEPHONE ENCOUNTER
Patient was clarified that she should start first bottle of clenpiq between 0995-7881 pm today with 40 oz of clear liquids, the rest of the prep 5 hours before the procedure  Patient was advised that her procedure is scheduled for 1315 on 3/29/23 based on appointment record in a chart  Scheduling department will call in afternoon today to confirm the exact time and to go over the instructions  Patient verbalized understanding

## 2023-03-29 ENCOUNTER — ANESTHESIA (OUTPATIENT)
Dept: GASTROENTEROLOGY | Facility: AMBULARY SURGERY CENTER | Age: 33
End: 2023-03-29

## 2023-03-29 ENCOUNTER — HOSPITAL ENCOUNTER (OUTPATIENT)
Dept: GASTROENTEROLOGY | Facility: AMBULARY SURGERY CENTER | Age: 33
Setting detail: OUTPATIENT SURGERY
Discharge: HOME | End: 2023-03-29
Attending: INTERNAL MEDICINE

## 2023-03-29 ENCOUNTER — ANESTHESIA EVENT (OUTPATIENT)
Dept: GASTROENTEROLOGY | Facility: AMBULARY SURGERY CENTER | Age: 33
End: 2023-03-29

## 2023-03-29 VITALS
DIASTOLIC BLOOD PRESSURE: 69 MMHG | SYSTOLIC BLOOD PRESSURE: 115 MMHG | TEMPERATURE: 96.3 F | HEART RATE: 77 BPM | RESPIRATION RATE: 18 BRPM | WEIGHT: 147 LBS | OXYGEN SATURATION: 98 % | BODY MASS INDEX: 25.1 KG/M2 | HEIGHT: 64 IN

## 2023-03-29 DIAGNOSIS — K62.5 RECTAL BLEEDING: ICD-10-CM

## 2023-03-29 DIAGNOSIS — K60.2 ANAL FISSURE: Primary | ICD-10-CM

## 2023-03-29 DIAGNOSIS — R19.5 LOOSE BOWEL MOVEMENT: ICD-10-CM

## 2023-03-29 LAB
EXT PREGNANCY TEST URINE: NEGATIVE
EXT. CONTROL: NORMAL

## 2023-03-29 RX ORDER — PROPOFOL 10 MG/ML
INJECTION, EMULSION INTRAVENOUS CONTINUOUS PRN
Status: DISCONTINUED | OUTPATIENT
Start: 2023-03-29 | End: 2023-03-29

## 2023-03-29 RX ORDER — SODIUM CHLORIDE, SODIUM LACTATE, POTASSIUM CHLORIDE, CALCIUM CHLORIDE 600; 310; 30; 20 MG/100ML; MG/100ML; MG/100ML; MG/100ML
125 INJECTION, SOLUTION INTRAVENOUS CONTINUOUS
Status: DISCONTINUED | OUTPATIENT
Start: 2023-03-29 | End: 2023-04-02 | Stop reason: HOSPADM

## 2023-03-29 RX ORDER — LIDOCAINE HYDROCHLORIDE 10 MG/ML
0.5 INJECTION, SOLUTION EPIDURAL; INFILTRATION; INTRACAUDAL; PERINEURAL ONCE AS NEEDED
Status: DISCONTINUED | OUTPATIENT
Start: 2023-03-29 | End: 2023-04-02 | Stop reason: HOSPADM

## 2023-03-29 RX ORDER — LIDOCAINE HYDROCHLORIDE 10 MG/ML
INJECTION, SOLUTION EPIDURAL; INFILTRATION; INTRACAUDAL; PERINEURAL AS NEEDED
Status: DISCONTINUED | OUTPATIENT
Start: 2023-03-29 | End: 2023-03-29

## 2023-03-29 RX ORDER — PROPOFOL 10 MG/ML
INJECTION, EMULSION INTRAVENOUS AS NEEDED
Status: DISCONTINUED | OUTPATIENT
Start: 2023-03-29 | End: 2023-03-29

## 2023-03-29 RX ADMIN — PROPOFOL 100 MG: 10 INJECTION, EMULSION INTRAVENOUS at 07:32

## 2023-03-29 RX ADMIN — Medication 40 MG: at 07:40

## 2023-03-29 RX ADMIN — PROPOFOL 100 MCG/KG/MIN: 10 INJECTION, EMULSION INTRAVENOUS at 07:32

## 2023-03-29 RX ADMIN — LIDOCAINE HYDROCHLORIDE 300 MG: 10 INJECTION, SOLUTION EPIDURAL; INFILTRATION; INTRACAUDAL at 07:50

## 2023-03-29 RX ADMIN — SODIUM CHLORIDE, SODIUM LACTATE, POTASSIUM CHLORIDE, AND CALCIUM CHLORIDE: .6; .31; .03; .02 INJECTION, SOLUTION INTRAVENOUS at 07:28

## 2023-03-29 NOTE — ANESTHESIA PREPROCEDURE EVALUATION
Procedure:  COLONOSCOPY    Relevant Problems   GI/HEPATIC   (+) Gastroesophageal reflux disease without esophagitis   (+) Rectal bleeding        Physical Exam    Airway    Mallampati score: II  TM Distance: >3 FB  Neck ROM: full     Dental   No notable dental hx     Cardiovascular  Rhythm: regular, Rate: normal, Cardiovascular exam normal    Pulmonary  Pulmonary exam normal Breath sounds clear to auscultation,     Other Findings        Anesthesia Plan  ASA Score- 2     Anesthesia Type- IV sedation with anesthesia with ASA Monitors  Additional Monitors:   Airway Plan:     Comment: Discussed risks/benefits, including medication reactions, awareness, aspiration, and serious/life threatening complications  Plan to maintain native airway with IVGA, monitored with EtCO2  Plan Factors-Exercise tolerance (METS): >4 METS  Patient summary reviewed  Patient instructed to abstain from smoking on day of procedure  Patient did not smoke on day of surgery  Induction- intravenous  Postoperative Plan-     Informed Consent- Anesthetic plan and risks discussed with patient  I personally reviewed this patient with the CRNA  Discussed and agreed on the Anesthesia Plan with the CRNA  Vivien Mckoy

## 2023-03-29 NOTE — H&P
"History and Physical -  [facility]  Krystin Peralta 28 y o  female MRN: [id number]    HPI: Krystin Peralta is a 28y o  year old female who presents for evaluation of rectal bleeding and diarrhea        Review of Systems    Historical Information   Past Medical History:   Diagnosis Date   • ADHD (attention deficit hyperactivity disorder)    • Dysmenorrhea     Last Assessed: 12/22/2015   • HSV-2 infection    • Postcoital bleeding     Last Assessed: 2/19/2015    • Vaginal pruritus     Resolved: 11/2/2017      Past Surgical History:   Procedure Laterality Date   • AUGMENTATION BREAST     • CERVICAL BIOPSY  W/ LOOP ELECTRODE EXCISION     • CHOLECYSTECTOMY     • COLPOSCOPY     • FOOT SURGERY      right foot lis franc fracture   • RECONSTRUCTION / CORRECTION OF NIPPLE / Lia Heft     • REVISION OF SCAR N/A 08/01/2022   • RHINOPLASTY       Social History   Social History     Substance and Sexual Activity   Alcohol Use Yes   • Alcohol/week: 2 0 standard drinks   • Types: 2 Glasses of wine per week    Comment: social     Social History     Substance and Sexual Activity   Drug Use No     Social History     Tobacco Use   Smoking Status Never   Smokeless Tobacco Never     Family History   Problem Relation Age of Onset   • Crohn's disease Mother    • Esophageal cancer Father    • No Known Problems Brother    • No Known Problems Brother    • COPD Maternal Grandmother    • Diabetes Family    • Cancer Family        Meds/Allergies     (Not in a hospital admission)      Allergies   Allergen Reactions   • Hydromorphone Hives   • Levofloxacin Other (See Comments)     Legs go numb       Objective     /63   Pulse 87   Temp (!) 97 °F (36 1 °C) (Temporal)   Resp 18   Ht 5' 4\" (1 626 m)   Wt 66 7 kg (147 lb)   LMP 02/23/2023 (Exact Date)   SpO2 97%   BMI 25 23 kg/m²       PHYSICAL EXAM    Gen: NAD  CV: RRR  CHEST: Clear  ABD: soft, NT/ND  EXT: no edema  Neuro: AAO      ASSESSMENT/PLAN:  This is a 28y o  year old " female here for evaluation of rectal bleeding  PLAN:   Procedure: Colonoscopy

## 2023-03-29 NOTE — ANESTHESIA POSTPROCEDURE EVALUATION
Post-Op Assessment Note    CV Status:  Stable  Pain Score: 0    Pain management: adequate     Mental Status:  Alert and awake   Hydration Status:  Stable   PONV Controlled:  None   Airway Patency:  Patent      Post Op Vitals Reviewed: Yes      Staff: CRNA         No notable events documented      /68 (03/29/23 0753)    Temp (!) 96 3 °F (35 7 °C) (03/29/23 0753)    Pulse 86 (03/29/23 0753)   Resp 16 (03/29/23 0753)    SpO2 98 % (03/29/23 0753)

## 2023-04-27 DIAGNOSIS — F90.0 ATTENTION DEFICIT HYPERACTIVITY DISORDER (ADHD), PREDOMINANTLY INATTENTIVE TYPE: ICD-10-CM

## 2023-04-27 DIAGNOSIS — K60.2 ANAL FISSURE: ICD-10-CM

## 2023-04-27 RX ORDER — DEXTROAMPHETAMINE SACCHARATE, AMPHETAMINE ASPARTATE, DEXTROAMPHETAMINE SULFATE AND AMPHETAMINE SULFATE 5; 5; 5; 5 MG/1; MG/1; MG/1; MG/1
20 TABLET ORAL DAILY
Qty: 30 TABLET | Refills: 0 | Status: SHIPPED | OUTPATIENT
Start: 2023-04-27

## 2023-04-27 RX ORDER — HYDROCORTISONE 25 MG/G
CREAM TOPICAL 2 TIMES DAILY
Qty: 28 G | Refills: 1 | Status: SHIPPED | OUTPATIENT
Start: 2023-04-27

## 2023-05-10 ENCOUNTER — NURSE TRIAGE (OUTPATIENT)
Dept: OTHER | Facility: OTHER | Age: 33
End: 2023-05-10

## 2023-05-10 DIAGNOSIS — F51.04 PSYCHOPHYSIOLOGICAL INSOMNIA: ICD-10-CM

## 2023-05-10 RX ORDER — LORAZEPAM 0.5 MG/1
0.5 TABLET ORAL
Qty: 10 TABLET | Refills: 0 | Status: SHIPPED | OUTPATIENT
Start: 2023-05-10

## 2023-05-10 NOTE — TELEPHONE ENCOUNTER
"Medication queued up for refill as requested and sent to the office as a high priority for approval during office hours  Patient was informed medication was sent to her provider for approval      Reason for Disposition  • Prescription refill request for a CONTROLLED substance (e g , narcotics, ADHD medicines)    Answer Assessment - Initial Assessment Questions  1  NAME of MEDICATION: \"What medicine are you calling about? \"      Ativan   2  QUESTION: Danika Rockingham is your question? \" (e g , medication refill, side effect)      Patient does not have enough medication for 3 days and will be on a plane tomorrow  3  PRESCRIBING HCP: \"Who prescribed it? \" Reason: if prescribed by specialist, call should be referred to that group        Dr Jon Ramirez    Protocols used: MEDICATION QUESTION CALL-ADULT-[place]    "

## 2023-05-10 NOTE — TELEPHONE ENCOUNTER
Regarding: Med refill  ----- Message from Ina Vazquez sent at 5/10/2023 12:51 PM EDT -----  Medication Refill Request     Name Lorazepam   Dose/Frequency 0 5mg daily at bedtime PRN  Quantity 10  Verified pharmacy   YES  Verified ordering Provider   YES  Does patient have enough for the next 3 days?  NO     [facility] [place] [address] [place] [address] [facility]   Phone:  [phone]  Fax:  [phone]

## 2023-05-31 DIAGNOSIS — F90.0 ATTENTION DEFICIT HYPERACTIVITY DISORDER (ADHD), PREDOMINANTLY INATTENTIVE TYPE: ICD-10-CM

## 2023-05-31 RX ORDER — DEXTROAMPHETAMINE SACCHARATE, AMPHETAMINE ASPARTATE, DEXTROAMPHETAMINE SULFATE AND AMPHETAMINE SULFATE 5; 5; 5; 5 MG/1; MG/1; MG/1; MG/1
20 TABLET ORAL DAILY
Qty: 30 TABLET | Refills: 0 | Status: SHIPPED | OUTPATIENT
Start: 2023-05-31

## 2023-06-16 ENCOUNTER — TELEPHONE (OUTPATIENT)
Dept: GYNECOLOGY | Facility: CLINIC | Age: 33
End: 2023-06-16

## 2023-06-16 DIAGNOSIS — B37.9 YEAST INFECTION: Primary | ICD-10-CM

## 2023-06-16 RX ORDER — FLUCONAZOLE 150 MG/1
150 TABLET ORAL ONCE
Qty: 2 TABLET | Refills: 0 | Status: SHIPPED | OUTPATIENT
Start: 2023-06-16 | End: 2023-06-16

## 2023-06-16 NOTE — TELEPHONE ENCOUNTER
Pt  States She was on ABX and now has a yeast infection and tested her self at home and would like to know if you can send in the 2 pills of diflucan   To [facility] [address] [phone]

## 2023-06-16 NOTE — TELEPHONE ENCOUNTER
Spoke to [facility] [employer] Pt   States She will teresa back in 20 minutes to schedule a yearly

## 2023-06-20 ENCOUNTER — TELEPHONE (OUTPATIENT)
Dept: GYNECOLOGY | Facility: CLINIC | Age: 33
End: 2023-06-20

## 2023-06-20 NOTE — TELEPHONE ENCOUNTER
[address]  Medical Assistant  Telephone Encounter     Signed  Encounter Date:  6/20/2023     Signed                  LM for Pt  To call the office and schedule an appt                Telephone on 6/20/2023    Telephone on 6/20/2023    Note shared with patient

## 2023-06-28 NOTE — PROGRESS NOTES
Assessment/Plan:      Will continue daily Valtrex  Pt made aware she must tell any partner of HSV before any sexual contact and that HSV can be transmitted with/without condoms/outbreak/suppression  The patient likes current contraceptive method and desires to continue, Nuva Ring  Recommended monthly SBE and annual CBE  ASCCP guidelines reviewed and pap collected today  STI testing ordered  She is aware that condoms are recommended with all sexual contact for STI prevention  Reviewed diet/activity recommendations  Return to the office in one year for routine annual gyn exam or sooner PRN  Diagnoses and all orders for this visit:    Encounter for gynecological examination (general) (routine) without abnormal findings        Subjective:      Patient ID: Keesha Gipson is a 28 y o  female  This patient presents for routine annual gyn exam    She is s/p LEEP 11/12/21      She has hx of positive HSV Type 2 IgG  She is taking Valtrex daily  She has never had an outbreak  She declines repeat testing  She is aware that she must inform any partner of diagnosis prior to any sexual contact  She denies acute gyn complaints  Menses are light and regular on Nuva Ring  She denies breast concerns, has B/L breast implants and right breast lift  She denies abn discharge, pelvic pain, bowel/bladder dysfunction, depression/anxiety  Sexually active, 2 partners in the last 12 months  Requests STI testing  Likes current contraception and desires to continue, Nuva RIng  She has completed Gardasil  The following portions of the patient's history were reviewed and updated as appropriate: allergies, current medications, past family history, past medical history, past social history, past surgical history and problem list     Review of Systems   Constitutional: Negative  HENT: Negative  Respiratory: Negative  Cardiovascular: Negative  Gastrointestinal: Negative  Endocrine: Negative      Genitourinary: "Negative for difficulty urinating, dyspareunia, dysuria, frequency, menstrual problem, pelvic pain, urgency, vaginal bleeding, vaginal discharge and vaginal pain  Musculoskeletal: Negative  Skin: Negative  Neurological: Negative  Psychiatric/Behavioral: Negative  Objective:      /70   Ht 5' 4\" (1 626 m)   Wt 65 8 kg (145 lb)   LMP 06/24/2023 (Exact Date)   BMI 24 89 kg/m²          Physical Exam  Vitals and nursing note reviewed  Exam conducted with a chaperone present  Constitutional:       Appearance: Normal appearance  She is well-developed  HENT:      Head: Normocephalic and atraumatic  Neck:      Thyroid: No thyroid mass or thyromegaly  Cardiovascular:      Rate and Rhythm: Normal rate and regular rhythm  Heart sounds: Normal heart sounds  Pulmonary:      Effort: Pulmonary effort is normal       Breath sounds: Normal breath sounds  Chest:   Breasts:     Breasts are symmetrical       Right: No inverted nipple, mass, nipple discharge, skin change or tenderness  Left: No inverted nipple, mass, nipple discharge, skin change or tenderness  Abdominal:      General: Bowel sounds are normal       Palpations: Abdomen is soft  Tenderness: There is no abdominal tenderness  Hernia: There is no hernia in the left inguinal area or right inguinal area  Genitourinary:     General: Normal vulva  Exam position: Supine  Pubic Area: No rash  Labia:         Right: No rash, tenderness, lesion or injury  Left: No rash, tenderness, lesion or injury  Urethra: No prolapse, urethral pain, urethral swelling or urethral lesion  Vagina: Normal  No signs of injury and foreign body  No vaginal discharge, erythema, tenderness, bleeding, lesions or prolapsed vaginal walls  Cervix: No cervical motion tenderness, discharge, friability, lesion, erythema, cervical bleeding or eversion        Uterus: Not deviated, not enlarged, not fixed, not " tender and no uterine prolapse  Adnexa:         Right: No mass, tenderness or fullness  Left: No mass, tenderness or fullness  Rectum: No external hemorrhoid  Comments: Urethra normal without lesions  No bladder tenderness  Musculoskeletal:         General: Normal range of motion  Cervical back: Normal range of motion and neck supple  Lymphadenopathy:      Lower Body: No right inguinal adenopathy  No left inguinal adenopathy  Skin:     General: Skin is warm and dry  Neurological:      Mental Status: She is alert and oriented to person, place, and time  Psychiatric:         Speech: Speech normal          Behavior: Behavior normal  Behavior is cooperative

## 2023-06-30 ENCOUNTER — APPOINTMENT (OUTPATIENT)
Dept: LAB | Facility: MEDICAL CENTER | Age: 33
End: 2023-06-30
Payer: COMMERCIAL

## 2023-06-30 ENCOUNTER — ANNUAL EXAM (OUTPATIENT)
Dept: GYNECOLOGY | Facility: CLINIC | Age: 33
End: 2023-06-30
Payer: COMMERCIAL

## 2023-06-30 VITALS
BODY MASS INDEX: 24.75 KG/M2 | SYSTOLIC BLOOD PRESSURE: 128 MMHG | HEIGHT: 64 IN | WEIGHT: 145 LBS | DIASTOLIC BLOOD PRESSURE: 70 MMHG

## 2023-06-30 DIAGNOSIS — Z11.3 SCREENING EXAMINATION FOR STD (SEXUALLY TRANSMITTED DISEASE): ICD-10-CM

## 2023-06-30 DIAGNOSIS — Z01.419 ENCOUNTER FOR GYNECOLOGICAL EXAMINATION (GENERAL) (ROUTINE) WITHOUT ABNORMAL FINDINGS: Primary | ICD-10-CM

## 2023-06-30 DIAGNOSIS — Z12.4 ENCOUNTER FOR PAPANICOLAOU SMEAR FOR CERVICAL CANCER SCREENING: ICD-10-CM

## 2023-06-30 LAB
HBV SURFACE AG SER QL: NORMAL
HIV 1+2 AB+HIV1 P24 AG SERPL QL IA: NORMAL
HIV 2 AB SERPL QL IA: NORMAL
HIV1 AB SERPL QL IA: NORMAL
HIV1 P24 AG SERPL QL IA: NORMAL
TREPONEMA PALLIDUM IGG+IGM AB [PRESENCE] IN SERUM OR PLASMA BY IMMUNOASSAY: NORMAL

## 2023-06-30 PROCEDURE — 87389 HIV-1 AG W/HIV-1&-2 AB AG IA: CPT

## 2023-06-30 PROCEDURE — G0145 SCR C/V CYTO,THINLAYER,RESCR: HCPCS | Performed by: OBSTETRICS & GYNECOLOGY

## 2023-06-30 PROCEDURE — 87340 HEPATITIS B SURFACE AG IA: CPT

## 2023-06-30 PROCEDURE — 87491 CHLMYD TRACH DNA AMP PROBE: CPT | Performed by: OBSTETRICS & GYNECOLOGY

## 2023-06-30 PROCEDURE — 86780 TREPONEMA PALLIDUM: CPT

## 2023-06-30 PROCEDURE — 36415 COLL VENOUS BLD VENIPUNCTURE: CPT

## 2023-06-30 PROCEDURE — 87591 N.GONORRHOEAE DNA AMP PROB: CPT | Performed by: OBSTETRICS & GYNECOLOGY

## 2023-06-30 PROCEDURE — 99395 PREV VISIT EST AGE 18-39: CPT | Performed by: OBSTETRICS & GYNECOLOGY

## 2023-07-05 LAB
C TRACH DNA SPEC QL NAA+PROBE: NEGATIVE
N GONORRHOEA DNA SPEC QL NAA+PROBE: NEGATIVE

## 2023-07-06 ENCOUNTER — TELEPHONE (OUTPATIENT)
Dept: GYNECOLOGY | Facility: CLINIC | Age: 33
End: 2023-07-06

## 2023-07-06 DIAGNOSIS — B37.9 YEAST INFECTION: Primary | ICD-10-CM

## 2023-07-06 RX ORDER — FLUCONAZOLE 150 MG/1
150 TABLET ORAL
Qty: 3 TABLET | Refills: 0 | Status: SHIPPED | OUTPATIENT
Start: 2023-07-06 | End: 2023-07-13

## 2023-07-06 NOTE — TELEPHONE ENCOUNTER
Patient has yeast infection and would like meds called to [facility] on [address]. She states she gets 3 diflucan, not 2. She says 2 does not help her.

## 2023-07-07 LAB
LAB AP GYN PRIMARY INTERPRETATION: NORMAL
Lab: NORMAL

## 2023-07-12 DIAGNOSIS — F90.0 ATTENTION DEFICIT HYPERACTIVITY DISORDER (ADHD), PREDOMINANTLY INATTENTIVE TYPE: ICD-10-CM

## 2023-07-12 DIAGNOSIS — F51.04 PSYCHOPHYSIOLOGICAL INSOMNIA: ICD-10-CM

## 2023-07-12 DIAGNOSIS — Z12.4 ENCOUNTER FOR PAPANICOLAOU SMEAR FOR CERVICAL CANCER SCREENING: Primary | ICD-10-CM

## 2023-07-12 RX ORDER — DEXTROAMPHETAMINE SACCHARATE, AMPHETAMINE ASPARTATE, DEXTROAMPHETAMINE SULFATE AND AMPHETAMINE SULFATE 5; 5; 5; 5 MG/1; MG/1; MG/1; MG/1
20 TABLET ORAL DAILY
Qty: 30 TABLET | Refills: 0 | Status: SHIPPED | OUTPATIENT
Start: 2023-07-12

## 2023-07-12 RX ORDER — LORAZEPAM 0.5 MG/1
0.5 TABLET ORAL
Qty: 10 TABLET | Refills: 0 | Status: SHIPPED | OUTPATIENT
Start: 2023-07-12

## 2023-07-17 DIAGNOSIS — Z12.4 ENCOUNTER FOR PAPANICOLAOU SMEAR FOR CERVICAL CANCER SCREENING: Primary | ICD-10-CM

## 2023-07-24 ENCOUNTER — TELEPHONE (OUTPATIENT)
Dept: INTERNAL MEDICINE CLINIC | Facility: CLINIC | Age: 33
End: 2023-07-24

## 2023-07-24 DIAGNOSIS — F90.9 ATTENTION DEFICIT HYPERACTIVITY DISORDER (ADHD), UNSPECIFIED ADHD TYPE: ICD-10-CM

## 2023-07-24 DIAGNOSIS — F90.0 ATTENTION DEFICIT HYPERACTIVITY DISORDER (ADHD), PREDOMINANTLY INATTENTIVE TYPE: ICD-10-CM

## 2023-07-24 RX ORDER — DEXTROAMPHETAMINE SACCHARATE, AMPHETAMINE ASPARTATE MONOHYDRATE, DEXTROAMPHETAMINE SULFATE AND AMPHETAMINE SULFATE 5; 5; 5; 5 MG/1; MG/1; MG/1; MG/1
20 CAPSULE, EXTENDED RELEASE ORAL DAILY
Qty: 30 CAPSULE | Refills: 0 | Status: SHIPPED | OUTPATIENT
Start: 2023-07-24 | End: 2023-08-16

## 2023-07-24 NOTE — TELEPHONE ENCOUNTER
Patient is asking if she can go back on the extended release of the adderall. States the instant release gives her a bad headache. Patient will be calling [facility] pharmacy to see which one has this available.         Please advise

## 2023-08-09 DIAGNOSIS — Z30.44 ENCOUNTER FOR SURVEILLANCE OF VAGINAL RING HORMONAL CONTRACEPTIVE DEVICE: ICD-10-CM

## 2023-08-09 RX ORDER — ETONOGESTREL/ETHINYL ESTRADIOL .12-.015MG
RING, VAGINAL VAGINAL
Qty: 3 EACH | Refills: 3 | Status: SHIPPED | OUTPATIENT
Start: 2023-08-09

## 2023-08-11 ENCOUNTER — TELEPHONE (OUTPATIENT)
Dept: INTERNAL MEDICINE CLINIC | Facility: CLINIC | Age: 33
End: 2023-08-11

## 2023-08-11 NOTE — TELEPHONE ENCOUNTER
Patient called in to report her medication (Adderall XR 20MG) had been changed to generic per insurance but it is causing her to feel sick and makes her feel like dry heaving. She was also given IR but this caused her HA. Patient is asking if a prior Barbara Rear can be started so she can get Brand Adderall XR 20MG? Please advise  Patient aware your out of office she is ok for this to be started when your back on 8/15/23.

## 2023-08-15 ENCOUNTER — TELEPHONE (OUTPATIENT)
Dept: INTERNAL MEDICINE CLINIC | Facility: CLINIC | Age: 33
End: 2023-08-15

## 2023-08-15 NOTE — TELEPHONE ENCOUNTER
She has to see gastroenterology first. She should make an appointment with Dr Mireille Guardado who recently performed her colonoscopy

## 2023-08-15 NOTE — TELEPHONE ENCOUNTER
Patient requesting order for Endoscopy? She reports she is having trouble with swallowing and regurgitation. Patient reports he has a family hx of esophagus issues (father and uncle).      Please advise

## 2023-08-16 DIAGNOSIS — F90.0 ATTENTION DEFICIT HYPERACTIVITY DISORDER (ADHD), PREDOMINANTLY INATTENTIVE TYPE: Primary | ICD-10-CM

## 2023-08-16 RX ORDER — DEXTROAMPHETAMINE SULFATE, DEXTROAMPHETAMINE SACCHARATE, AMPHETAMINE SULFATE AND AMPHETAMINE ASPARTATE 5; 5; 5; 5 MG/1; MG/1; MG/1; MG/1
20 CAPSULE, EXTENDED RELEASE ORAL EVERY MORNING
Qty: 30 CAPSULE | Refills: 0 | Status: SHIPPED | OUTPATIENT
Start: 2023-08-16 | End: 2023-09-01 | Stop reason: SDUPTHER

## 2023-08-23 ENCOUNTER — LAB REQUISITION (OUTPATIENT)
Dept: LAB | Facility: HOSPITAL | Age: 33
End: 2023-08-23
Payer: COMMERCIAL

## 2023-08-23 DIAGNOSIS — J35.01 CHRONIC TONSILLITIS: ICD-10-CM

## 2023-08-23 PROCEDURE — 88341 IMHCHEM/IMCYTCHM EA ADD ANTB: CPT | Performed by: STUDENT IN AN ORGANIZED HEALTH CARE EDUCATION/TRAINING PROGRAM

## 2023-08-23 PROCEDURE — 88360 TUMOR IMMUNOHISTOCHEM/MANUAL: CPT | Performed by: STUDENT IN AN ORGANIZED HEALTH CARE EDUCATION/TRAINING PROGRAM

## 2023-08-23 PROCEDURE — 88304 TISSUE EXAM BY PATHOLOGIST: CPT | Performed by: STUDENT IN AN ORGANIZED HEALTH CARE EDUCATION/TRAINING PROGRAM

## 2023-08-23 PROCEDURE — 88342 IMHCHEM/IMCYTCHM 1ST ANTB: CPT | Performed by: STUDENT IN AN ORGANIZED HEALTH CARE EDUCATION/TRAINING PROGRAM

## 2023-08-23 PROCEDURE — 88365 INSITU HYBRIDIZATION (FISH): CPT | Performed by: STUDENT IN AN ORGANIZED HEALTH CARE EDUCATION/TRAINING PROGRAM

## 2023-08-23 PROCEDURE — 88364 INSITU HYBRIDIZATION (FISH): CPT | Performed by: STUDENT IN AN ORGANIZED HEALTH CARE EDUCATION/TRAINING PROGRAM

## 2023-08-23 PROCEDURE — 88312 SPECIAL STAINS GROUP 1: CPT | Performed by: STUDENT IN AN ORGANIZED HEALTH CARE EDUCATION/TRAINING PROGRAM

## 2023-08-24 NOTE — TELEPHONE ENCOUNTER
Called patient and left message and advised to call back and let us know if previous medications has been tried before.

## 2023-08-24 NOTE — TELEPHONE ENCOUNTER
Inform the patient that the brand Adderall is denied. Has she been on anything else like Focalin Ritalin or Vyvanse before?

## 2023-09-01 DIAGNOSIS — F90.0 ATTENTION DEFICIT HYPERACTIVITY DISORDER (ADHD), PREDOMINANTLY INATTENTIVE TYPE: ICD-10-CM

## 2023-09-01 RX ORDER — DEXTROAMPHETAMINE SACCHARATE, AMPHETAMINE ASPARTATE MONOHYDRATE, DEXTROAMPHETAMINE SULFATE AND AMPHETAMINE SULFATE 5; 5; 5; 5 MG/1; MG/1; MG/1; MG/1
20 CAPSULE, EXTENDED RELEASE ORAL EVERY MORNING
Qty: 30 CAPSULE | Refills: 0 | Status: SHIPPED | OUTPATIENT
Start: 2023-09-01

## 2023-09-11 ENCOUNTER — TELEPHONE (OUTPATIENT)
Dept: INTERNAL MEDICINE CLINIC | Facility: CLINIC | Age: 33
End: 2023-09-11

## 2023-09-11 NOTE — TELEPHONE ENCOUNTER
EKG can be done at the visit.  I will likely be the one refilling the Adderall but I want to see that her EKG is normal first.

## 2023-09-11 NOTE — TELEPHONE ENCOUNTER
I scheduled patient with Dr. Tristen Dent for 9/13 she reports chest pain that started last night and hard to breath but fells better this morning. Reports she listen to her heart and there is irregularity so she wanted to get an EKG done? She also asked about her medication (adderall) would this be something Dr. Tristen Dent could fill at her appointment with him or it this strictly maintained by you?     Please advise

## 2023-09-12 ENCOUNTER — OFFICE VISIT (OUTPATIENT)
Dept: INTERNAL MEDICINE CLINIC | Facility: CLINIC | Age: 33
End: 2023-09-12
Payer: COMMERCIAL

## 2023-09-12 VITALS
HEIGHT: 64 IN | RESPIRATION RATE: 16 BRPM | SYSTOLIC BLOOD PRESSURE: 128 MMHG | OXYGEN SATURATION: 98 % | DIASTOLIC BLOOD PRESSURE: 78 MMHG | HEART RATE: 74 BPM | BODY MASS INDEX: 24.3 KG/M2 | WEIGHT: 142.3 LBS

## 2023-09-12 DIAGNOSIS — R07.9 CHEST PAIN, UNSPECIFIED TYPE: Primary | ICD-10-CM

## 2023-09-12 PROCEDURE — 93000 ELECTROCARDIOGRAM COMPLETE: CPT | Performed by: INTERNAL MEDICINE

## 2023-09-12 PROCEDURE — 99214 OFFICE O/P EST MOD 30 MIN: CPT | Performed by: INTERNAL MEDICINE

## 2023-09-12 RX ORDER — ACETAMINOPHEN AND CODEINE PHOSPHATE 120; 12 MG/5ML; MG/5ML
SOLUTION ORAL
COMMUNITY
Start: 2023-08-29

## 2023-09-12 NOTE — ASSESSMENT & PLAN NOTE
-Patient's pain appears to be noncardiac in etiology. It is reproducible on exam.  Her point-of-care EKG revealed sinus arrhythmia. On exam the arrhythmia is more pronounced with inspiration. I informed her that this is a respiratory sinus arrhythmia and that should not cause chest pain and it does not require additional cardiac work-up in and of itself. However given her reports of heart racing and palpitations that night we will check a 24-hour Holter monitor study as well as a CMP, TSH and magnesium level. Advised her to abstain from vaping and to minimize alcohol use.

## 2023-09-12 NOTE — PROGRESS NOTES
Name: Brooks Yo      : 1990      MRN: [id number]  Encounter Provider: Ramses Morales MD  Encounter Date: 2023   Encounter department: [facility]     1. Chest pain, unspecified type  Assessment & Plan:  -Patient's pain appears to be noncardiac in etiology. It is reproducible on exam.  Her point-of-care EKG revealed sinus arrhythmia. On exam the arrhythmia is more pronounced with inspiration. I informed her that this is a respiratory sinus arrhythmia and that should not cause chest pain and it does not require additional cardiac work-up in and of itself. However given her reports of heart racing and palpitations that night we will check a 24-hour Holter monitor study as well as a CMP, TSH and magnesium level. Advised her to abstain from vaping and to minimize alcohol use. Orders:  -     POCT ECG  -     Comprehensive metabolic panel; Future  -     Magnesium; Future  -     TSH, 3rd generation; Future  -     Holter monitor; Future; Expected date: 2023           Subjective      HPI  Patient presents today as an acute visit. The patient reports 2 days ago overnight the patient developed chest discomfort with difficulty breathing. She felt at this time her heart was irregular and that she was experiencing a racing sensation. She notes earlier that day she was at a country music concert where she had 3 mixed drinks. She also notes that was the first time she had ever vaped nicotine. The next day her symptoms improved and she called in requesting an EKG for further evaluation. All other systems negative except for pertinent findings noted in HPI.        Current Outpatient Medications on File Prior to Visit   Medication Sig   • amphetamine-dextroamphetamine (ADDERALL XR, 20MG,) 20 MG 24 hr capsule Take 1 capsule (20 mg total) by mouth every morning Max Daily Amount: 20 mg   • LORazepam (ATIVAN) 0.5 mg tablet Take 1 tablet (0.5 mg total) by mouth daily at bedtime as needed for sedation   • NuvaRing 0.12-0.015 MG/24HR vaginal ring INSERT 1 VAGINALLY EVERY 30 DAYS AND LEAVE IN PLACE FOR 3 CONSECUTIVE WEEKS, THEN REMOVE X1 WEEK.    • acetaminophen-codeine (TYLENOL WITH CODEINE) 120-12 mg/5 mL solution TAKE 20 ML BY MOUTH EVERY 4-6 HOURS AS NEEDED FOR PAIN (Patient not taking: Reported on 9/12/2023)   • hydrocortisone (ANUSOL-HC) 2.5 % rectal cream Apply topically 2 (two) times a day (Patient not taking: Reported on 6/30/2023)   • NIFEdipine 0.3%-lidocaine 5% rectal ointment Apply a pea sized amount to the anal fissure as needed every 6 hours for pain and itching (Patient not taking: Reported on 9/12/2023)       Objective     /78   Pulse 74   Resp 16   Ht 5' 4" (1.626 m)   Wt 64.5 kg (142 lb 4.8 oz)   SpO2 98%   BMI 24.43 kg/m²     BP Readings from Last 3 Encounters:   09/12/23 128/78   06/30/23 128/70   03/29/23 115/69        Wt Readings from Last 3 Encounters:   09/12/23 64.5 kg (142 lb 4.8 oz)   06/30/23 65.8 kg (145 lb)   03/29/23 66.7 kg (147 lb)       Physical Exam    General: NAD, Alert and oriented x3   HEENT: NCAT, EOMI, normal conjunctiva  Cardiovascular: RRR, normal S1 and S2, no m/r/g  Pulmonary: Normal respiratory effort, no wheezes, rales or rhonchi  GI: Soft, nontender, nondistended, normoactive bowel sounds  MSK: Normal bulk and tone, left-sided tenderness at the costochondral junction  Skin: Normal skin color, no rashes     Lon Hung MD

## 2023-09-14 DIAGNOSIS — F51.04 PSYCHOPHYSIOLOGICAL INSOMNIA: ICD-10-CM

## 2023-09-14 RX ORDER — LORAZEPAM 0.5 MG/1
0.5 TABLET ORAL
Qty: 10 TABLET | Refills: 0 | Status: SHIPPED | OUTPATIENT
Start: 2023-09-14

## 2023-09-15 ENCOUNTER — TELEPHONE (OUTPATIENT)
Dept: GYNECOLOGY | Facility: CLINIC | Age: 33
End: 2023-09-15

## 2023-09-15 DIAGNOSIS — B37.9 YEAST INFECTION: Primary | ICD-10-CM

## 2023-09-15 RX ORDER — FLUCONAZOLE 150 MG/1
150 TABLET ORAL ONCE
Qty: 2 TABLET | Refills: 0 | Status: SHIPPED | OUTPATIENT
Start: 2023-09-15 | End: 2023-09-15

## 2023-09-15 NOTE — TELEPHONE ENCOUNTER
Carlos Castellanos called and states She is starting a yeast infection again and would like Diflucan sent to [facility]

## 2023-09-18 ENCOUNTER — TELEPHONE (OUTPATIENT)
Dept: GYNECOLOGY | Facility: CLINIC | Age: 33
End: 2023-09-18

## 2023-09-18 DIAGNOSIS — B37.9 YEAST INFECTION: Primary | ICD-10-CM

## 2023-09-18 RX ORDER — FLUCONAZOLE 150 MG/1
150 TABLET ORAL ONCE
Qty: 2 TABLET | Refills: 0 | Status: SHIPPED | OUTPATIENT
Start: 2023-09-18 | End: 2023-09-18

## 2023-09-18 NOTE — TELEPHONE ENCOUNTER
Reema Moncada called and states She usually gets 4 Diflucan and would like 2 more tablets sent to [facility]

## 2023-10-10 DIAGNOSIS — F51.04 PSYCHOPHYSIOLOGICAL INSOMNIA: ICD-10-CM

## 2023-10-10 RX ORDER — LORAZEPAM 0.5 MG/1
0.5 TABLET ORAL
Qty: 10 TABLET | Refills: 0 | Status: SHIPPED | OUTPATIENT
Start: 2023-10-10

## 2023-10-24 ENCOUNTER — TELEPHONE (OUTPATIENT)
Dept: GYNECOLOGY | Facility: CLINIC | Age: 33
End: 2023-10-24

## 2023-10-24 DIAGNOSIS — B37.9 YEAST INFECTION: Primary | ICD-10-CM

## 2023-10-24 DIAGNOSIS — B00.9 HSV-2 (HERPES SIMPLEX VIRUS 2) INFECTION: Primary | ICD-10-CM

## 2023-10-24 RX ORDER — VALACYCLOVIR HYDROCHLORIDE 500 MG/1
500 TABLET, FILM COATED ORAL 2 TIMES DAILY
Qty: 14 TABLET | Refills: 4 | Status: SHIPPED | OUTPATIENT
Start: 2023-10-24 | End: 2023-10-31

## 2023-10-24 RX ORDER — FLUCONAZOLE 150 MG/1
150 TABLET ORAL ONCE
Qty: 2 TABLET | Refills: 1 | Status: SHIPPED | OUTPATIENT
Start: 2023-10-24 | End: 2023-10-24

## 2023-10-24 NOTE — TELEPHONE ENCOUNTER
Patient called and has yeast infection. Can you please send diflucan to [facility] on [address]. Please advise.

## 2023-11-03 DIAGNOSIS — F51.04 PSYCHOPHYSIOLOGICAL INSOMNIA: ICD-10-CM

## 2023-11-03 DIAGNOSIS — F90.0 ATTENTION DEFICIT HYPERACTIVITY DISORDER (ADHD), PREDOMINANTLY INATTENTIVE TYPE: ICD-10-CM

## 2023-11-05 RX ORDER — LORAZEPAM 0.5 MG/1
0.5 TABLET ORAL
Qty: 10 TABLET | Refills: 0 | Status: SHIPPED | OUTPATIENT
Start: 2023-11-05

## 2023-11-05 RX ORDER — DEXTROAMPHETAMINE SACCHARATE, AMPHETAMINE ASPARTATE MONOHYDRATE, DEXTROAMPHETAMINE SULFATE AND AMPHETAMINE SULFATE 5; 5; 5; 5 MG/1; MG/1; MG/1; MG/1
20 CAPSULE, EXTENDED RELEASE ORAL EVERY MORNING
Qty: 30 CAPSULE | Refills: 0 | Status: SHIPPED | OUTPATIENT
Start: 2023-11-05

## 2023-11-21 DIAGNOSIS — F51.04 PSYCHOPHYSIOLOGICAL INSOMNIA: ICD-10-CM

## 2023-11-21 RX ORDER — LORAZEPAM 0.5 MG/1
0.5 TABLET ORAL
Qty: 10 TABLET | Refills: 0 | Status: SHIPPED | OUTPATIENT
Start: 2023-11-21 | End: 2023-11-30 | Stop reason: SDUPTHER

## 2023-11-30 DIAGNOSIS — F51.04 PSYCHOPHYSIOLOGICAL INSOMNIA: ICD-10-CM

## 2023-11-30 RX ORDER — LORAZEPAM 0.5 MG/1
0.5 TABLET ORAL
Qty: 10 TABLET | Refills: 0 | Status: SHIPPED | OUTPATIENT
Start: 2023-11-30 | End: 2023-12-07

## 2023-12-07 ENCOUNTER — OFFICE VISIT (OUTPATIENT)
Dept: INTERNAL MEDICINE CLINIC | Facility: CLINIC | Age: 33
End: 2023-12-07
Payer: COMMERCIAL

## 2023-12-07 VITALS
SYSTOLIC BLOOD PRESSURE: 110 MMHG | HEART RATE: 79 BPM | OXYGEN SATURATION: 98 % | HEIGHT: 64 IN | DIASTOLIC BLOOD PRESSURE: 74 MMHG | WEIGHT: 147.2 LBS | BODY MASS INDEX: 25.13 KG/M2

## 2023-12-07 DIAGNOSIS — F41.9 ANXIETY: ICD-10-CM

## 2023-12-07 DIAGNOSIS — F90.0 ATTENTION DEFICIT HYPERACTIVITY DISORDER (ADHD), PREDOMINANTLY INATTENTIVE TYPE: ICD-10-CM

## 2023-12-07 DIAGNOSIS — F90.9 ATTENTION DEFICIT HYPERACTIVITY DISORDER (ADHD), UNSPECIFIED ADHD TYPE: ICD-10-CM

## 2023-12-07 DIAGNOSIS — J06.9 ACUTE URI: ICD-10-CM

## 2023-12-07 DIAGNOSIS — Z00.00 ANNUAL PHYSICAL EXAM: Primary | ICD-10-CM

## 2023-12-07 PROCEDURE — 99213 OFFICE O/P EST LOW 20 MIN: CPT | Performed by: INTERNAL MEDICINE

## 2023-12-07 PROCEDURE — 99395 PREV VISIT EST AGE 18-39: CPT | Performed by: INTERNAL MEDICINE

## 2023-12-07 RX ORDER — DEXTROAMPHETAMINE SACCHARATE, AMPHETAMINE ASPARTATE MONOHYDRATE, DEXTROAMPHETAMINE SULFATE AND AMPHETAMINE SULFATE 5; 5; 5; 5 MG/1; MG/1; MG/1; MG/1
20 CAPSULE, EXTENDED RELEASE ORAL EVERY MORNING
Qty: 30 CAPSULE | Refills: 0 | Status: SHIPPED | OUTPATIENT
Start: 2023-12-07

## 2023-12-07 RX ORDER — LORAZEPAM 0.5 MG/1
0.5 TABLET ORAL
Qty: 14 TABLET | Refills: 0 | Status: SHIPPED | OUTPATIENT
Start: 2023-12-07

## 2023-12-07 NOTE — ASSESSMENT & PLAN NOTE
-Likely viral in etiology  -Recommended Mucinex DM for cough and congestion  -Daily salt water gargles for sore throat  -Saline nasal spray for runny nose and postnasal drip

## 2023-12-07 NOTE — PROGRESS NOTES
[employer]    NAME: Brooks Yo  AGE: 35 y.o. SEX: female  : 1990     DATE: 2023     Assessment and Plan:     Problem List Items Addressed This Visit     Attention-deficit/hyperactivity disorder     -Stable  -Doing well on current dose of Adderall         Relevant Medications    LORazepam (ATIVAN) 0.5 mg tablet    amphetamine-dextroamphetamine (ADDERALL XR, 20MG,) 20 MG 24 hr capsule    Anxiety     -Refilled Ativan         Relevant Medications    LORazepam (ATIVAN) 0.5 mg tablet    Acute URI     -Likely viral in etiology  -Recommended Mucinex DM for cough and congestion  -Daily salt water gargles for sore throat  -Saline nasal spray for runny nose and postnasal drip        Other Visit Diagnoses     Annual physical exam    -  Primary          Immunizations and preventive care screenings were discussed with patient today. Appropriate education was printed on patient's after visit summary. Depression Screening and Follow-up Plan: Patient was screened for depression during today's encounter. They screened negative with a PHQ-2 score of 0. No follow-ups on file. Chief Complaint:     Chief Complaint   Patient presents with   • Cough     Pt has a cough, sore throat and headache , covid test negative   • Physical Exam      History of Present Illness:     Adult Annual Physical   Patient here for a comprehensive physical exam.  She complains of cough and cold symptoms that started 4 to 5 days ago. She endorses sore throat in addition to sinus congestion. She performed an at home COVID test which was negative. She notes she has been under more stress recently as she will be moving to [place] in  as result of a job transfer. Regarding her ADHD she feels it has been stable on her current dose of Adderall.   From an anxiety standpoint she notes it has been worse than usual due to everything she needs to accomplish prior to moving. She has been using her Ativan as needed. Depression Screening  PHQ-2/9 Depression Screening    Little interest or pleasure in doing things: 0 - not at all  Feeling down, depressed, or hopeless: 0 - not at all  PHQ-2 Score: 0  PHQ-2 Interpretation: Negative depression screen          Review of Systems:     Review of Systems  All other systems negative except for pertinent findings noted in HPI. Past Medical History:     Past Medical History:   Diagnosis Date   • ADHD (attention deficit hyperactivity disorder)    • Dysmenorrhea     Last Assessed: 12/22/2015   • HSV-2 infection    • Postcoital bleeding     Last Assessed: 2/19/2015    • Vaginal pruritus     Resolved: 11/2/2017       Past Surgical History:     Past Surgical History:   Procedure Laterality Date   • AUGMENTATION BREAST     • CERVICAL BIOPSY  W/ LOOP ELECTRODE EXCISION     • CHOLECYSTECTOMY     • COLPOSCOPY     • FOOT SURGERY      right foot lis franc fracture   • RECONSTRUCTION / CORRECTION OF NIPPLE / Dorethia Caesar     • REVISION OF SCAR N/A 08/01/2022   • RHINOPLASTY        Social History:     Social History     Socioeconomic History   • Marital status: Single     Spouse name: None   • Number of children: None   • Years of education: None   • Highest education level: None   Occupational History   • None   Tobacco Use   • Smoking status: Never   • Smokeless tobacco: Never   Vaping Use   • Vaping Use: Never used   Substance and Sexual Activity   • Alcohol use:  Yes     Alcohol/week: 2.0 standard drinks of alcohol     Types: 2 Glasses of wine per week     Comment: social   • Drug use: No   • Sexual activity: Yes     Partners: Male     Birth control/protection: Ring   Other Topics Concern   • None   Social History Narrative         Consumes 1-2 cups of coffee per day     Social Determinants of Health     Financial Resource Strain: Not on file   Food Insecurity: Not on file   Transportation Needs: Not on file   Physical Activity: Not on file   Stress: Not on file   Social Connections: Not on file   Intimate Partner Violence: Not on file   Housing Stability: Not on file      Family History:     Family History   Problem Relation Age of Onset   • Crohn's disease Mother    • Esophageal cancer Father    • No Known Problems Brother    • No Known Problems Brother    • COPD Maternal Grandmother    • Diabetes Family    • Cancer Family       Current Medications:     Current Outpatient Medications   Medication Sig Dispense Refill   • amphetamine-dextroamphetamine (ADDERALL XR, 20MG,) 20 MG 24 hr capsule Take 1 capsule (20 mg total) by mouth every morning Max Daily Amount: 20 mg 30 capsule 0   • LORazepam (ATIVAN) 0.5 mg tablet Take 1 tablet (0.5 mg total) by mouth daily at bedtime as needed for sedation or anxiety 14 tablet 0   • NuvaRing 0.12-0.015 MG/24HR vaginal ring INSERT 1 VAGINALLY EVERY 30 DAYS AND LEAVE IN PLACE FOR 3 CONSECUTIVE WEEKS, THEN REMOVE X1 WEEK. 3 each 3   • valACYclovir (VALTREX) 500 mg tablet Take 1 tablet (500 mg total) by mouth 2 (two) times a day for 7 days 14 tablet 4     No current facility-administered medications for this visit. Allergies:      Allergies   Allergen Reactions   • Hydromorphone Hives   • Levofloxacin Other (See Comments)     Legs go numb      Physical Exam:     /74   Pulse 79   Ht 5' 4" (1.626 m)   Wt 66.8 kg (147 lb 3.2 oz)   SpO2 98%   BMI 25.27 kg/m²     Physical Exam   General: NAD  HEENT: NCAT, EOMI, normal conjunctiva  Cardiovascular: RRR, normal S1 and S2, no m/r/g  Pulmonary: Normal respiratory effort, no wheezes, rales or rhonchi  GI: Soft, nontender, nondistended, normoactive bowel sounds  Musculoskeletal: Normal bulk and tone  Neuro: Non-focal, ambulating without difficulty, non-antalgic gait  Extremities: No lower extremity edema  Skin: Normal skin color, no rashes   Psychiatric: Normal mood and affect    Lon Levin MD   [facility]

## 2023-12-07 NOTE — PATIENT INSTRUCTIONS
-Refills for your Adderall and Ativan have been sent to your pharmacy  -For your cough and cold symptoms you may take over-the-counter Mucinex DM as needed  -For sore throat perform daily salt water gargles and use throat lozenges  -For runny nose and postnasal drip use a saline nasal spray as needed    Wellness Visit for Adults   AMBULATORY CARE:   A wellness visit  is when you see your healthcare provider to get screened for health problems. Your healthcare provider will also give you advice on how to stay healthy. Write down your questions so you remember to ask them. Ask your healthcare provider how often you should have a wellness visit. What happens at a wellness visit:  Your healthcare provider will ask about your health, and your family history of health problems. This includes high blood pressure, heart disease, and cancer. He or she will ask if you have symptoms that concern you, if you smoke, and about your mood. You may also be asked about your intake of medicines, supplements, food, and alcohol. Any of the following may be done: Your weight  will be checked. Your height may also be checked so your body mass index (BMI) can be calculated. Your BMI shows if you are at a healthy weight. Your blood pressure  and heart rate will be checked. Your temperature may also be checked. Blood and urine tests  may be done. Blood tests may be done to check your cholesterol levels. Abnormal cholesterol levels increase your risk for heart disease and stroke. You may also need a blood or urine test to check for diabetes if you are at increased risk. Urine tests may be done to look for signs of an infection or kidney disease. A physical exam  includes checking your heartbeat and lungs with a stethoscope. Your healthcare provider may also check your skin to look for sun damage. Screening tests  may be recommended. A screening test is done to check for diseases that may not cause symptoms.  The screening tests you may need depend on your age, gender, family history, and lifestyle habits. For example, colorectal screening may be recommended if you are 48years old or older. Screening tests you need if you are a woman:   A Pap smear  is used to screen for cervical cancer. Pap smears are usually done every 3 to 5 years depending on your age. You may need them more often if you have had abnormal Pap smear test results in the past. Ask your healthcare provider how often you should have a Pap smear. A mammogram  is an x-ray of your breasts to screen for breast cancer. Experts recommend mammograms every 2 years starting at age 48 years. You may need a mammogram at age 52 years or younger if you have an increased risk for breast cancer. Talk to your healthcare provider about when you should start having mammograms and how often you need them. Vaccines you may need:   Get an influenza vaccine  every year. The influenza vaccine protects you from the flu. Several types of viruses cause the flu. The viruses change over time, so new vaccines are made each year. Get a tetanus-diphtheria (Td) booster vaccine  every 10 years. This vaccine protects you against tetanus and diphtheria. Tetanus is a severe infection that may cause painful muscle spasms and lockjaw. Diphtheria is a severe bacterial infection that causes a thick covering in the back of your mouth and throat. Get a human papillomavirus (HPV) vaccine  if you are female and aged 23 to 32 or male 23 to 24 and never received it. This vaccine protects you from HPV infection. HPV is the most common infection spread by sexual contact. HPV may also cause vaginal, penile, and anal cancers. Get a pneumococcal vaccine  if you are aged 72 years or older. The pneumococcal vaccine is an injection given to protect you from pneumococcal disease. Pneumococcal disease is an infection caused by pneumococcal bacteria.  The infection may cause pneumonia, meningitis, or an ear infection. Get a shingles vaccine  if you are 60 or older, even if you have had shingles before. The shingles vaccine is an injection to protect you from the varicella-zoster virus. This is the same virus that causes chickenpox. Shingles is a painful rash that develops in people who had chickenpox or have been exposed to the virus. How to eat healthy:  My Plate is a model for planning healthy meals. It shows the types and amounts of foods that should go on your plate. Fruits and vegetables make up about half of your plate, and grains and protein make up the other half. A serving of dairy is included on the side of your plate. The amount of calories and serving sizes you need depends on your age, gender, weight, and height. Examples of healthy foods are listed below:  Eat a variety of vegetables  such as dark green, red, and orange vegetables. You can also include canned vegetables low in sodium (salt) and frozen vegetables without added butter or sauces. Eat a variety of fresh fruits , canned fruit in 100% juice, frozen fruit, and dried fruit. Include whole grains. At least half of the grains you eat should be whole grains. Examples include whole-wheat bread, wheat pasta, brown rice, and whole-grain cereals such as oatmeal.    Eat a variety of protein foods such as seafood (fish and shellfish), lean meat, and poultry without skin (turkey and chicken). Examples of lean meats include pork leg, shoulder, or tenderloin, and beef round, sirloin, tenderloin, and extra lean ground beef. Other protein foods include eggs and egg substitutes, beans, peas, soy products, nuts, and seeds. Choose low-fat dairy products such as skim or 1% milk or low-fat yogurt, cheese, and cottage cheese. Limit unhealthy fats  such as butter, hard margarine, and shortening. Exercise:  Exercise at least 30 minutes per day on most days of the week. Some examples of exercise include walking, biking, dancing, and swimming. You can also fit in more physical activity by taking the stairs instead of the elevator or parking farther away from stores. Include muscle strengthening activities 2 days each week. Regular exercise provides many health benefits. It helps you manage your weight, and decreases your risk for type 2 diabetes, heart disease, stroke, and high blood pressure. Exercise can also help improve your mood. Ask your healthcare provider about the best exercise plan for you. General health and safety guidelines:   Do not smoke. Nicotine and other chemicals in cigarettes and cigars can cause lung damage. Ask your healthcare provider for information if you currently smoke and need help to quit. E-cigarettes or smokeless tobacco still contain nicotine. Talk to your healthcare provider before you use these products. Limit alcohol. A drink of alcohol is 12 ounces of beer, 5 ounces of wine, or 1½ ounces of liquor. Lose weight, if needed. Being overweight increases your risk of certain health conditions. These include heart disease, high blood pressure, type 2 diabetes, and certain types of cancer. Protect your skin. Do not sunbathe or use tanning beds. Use sunscreen with a SPF 15 or higher. Apply sunscreen at least 15 minutes before you go outside. Reapply sunscreen every 2 hours. Wear protective clothing, hats, and sunglasses when you are outside. Drive safely. Always wear your seatbelt. Make sure everyone in your car wears a seatbelt. A seatbelt can save your life if you are in an accident. Do not use your cell phone when you are driving. This could distract you and cause an accident. Pull over if you need to make a call or send a text message. Practice safe sex. Use latex condoms if are sexually active and have more than one partner. Your healthcare provider may recommend screening tests for sexually transmitted infections (STIs). Wear helmets, lifejackets, and protective gear.   Always wear a helmet when you ride a bike or motorcycle, go skiing, or play sports that could cause a head injury. Wear protective equipment when you play sports. Wear a lifejacket when you are on a boat or doing water sports. © Copyright Cathie Alba 2023 Information is for End User's use only and may not be sold, redistributed or otherwise used for commercial purposes. The above information is an  only. It is not intended as medical advice for individual conditions or treatments. Talk to your doctor, nurse or pharmacist before following any medical regimen to see if it is safe and effective for you.

## 2024-01-24 DIAGNOSIS — F41.9 ANXIETY: ICD-10-CM

## 2024-01-25 RX ORDER — LORAZEPAM 0.5 MG/1
0.5 TABLET ORAL
Qty: 14 TABLET | Refills: 0 | Status: SHIPPED | OUTPATIENT
Start: 2024-01-25

## 2024-02-05 PROBLEM — J06.9 ACUTE URI: Status: RESOLVED | Noted: 2023-12-07 | Resolved: 2024-02-05

## 2024-02-21 PROBLEM — Z01.419 ENCOUNTER FOR ANNUAL ROUTINE GYNECOLOGICAL EXAMINATION: Status: RESOLVED | Noted: 2018-05-10 | Resolved: 2024-02-21

## 2024-02-28 DIAGNOSIS — F90.9 ATTENTION DEFICIT HYPERACTIVITY DISORDER (ADHD), UNSPECIFIED ADHD TYPE: ICD-10-CM

## 2024-02-29 RX ORDER — DEXTROAMPHETAMINE SACCHARATE, AMPHETAMINE ASPARTATE MONOHYDRATE, DEXTROAMPHETAMINE SULFATE AND AMPHETAMINE SULFATE 5; 5; 5; 5 MG/1; MG/1; MG/1; MG/1
20 CAPSULE, EXTENDED RELEASE ORAL EVERY MORNING
Qty: 30 CAPSULE | Refills: 0 | Status: SHIPPED | OUTPATIENT
Start: 2024-02-29

## 2024-03-01 ENCOUNTER — TELEPHONE (OUTPATIENT)
Age: 34
End: 2024-03-01

## 2024-03-01 NOTE — TELEPHONE ENCOUNTER
Spoke with patient. She is living in [place] and they are all out of the Adderall everywhere. She wants to know if there is an alternative to this in generic form, due to insurance. That can be sent to the [place] pharmacy.     Please advise.

## 2024-03-01 NOTE — TELEPHONE ENCOUNTER
The patient called to report the medication   amphetamine-dextroamphetamine (ADDERALL XR, 20MG,) 20 MG 24 hr capsule     Is at the moment on back order she would like to know if they can prescribe her the medication at this at the market at the moment     Please contact the patient for advice

## 2024-03-03 DIAGNOSIS — F90.9 ATTENTION DEFICIT HYPERACTIVITY DISORDER (ADHD), UNSPECIFIED ADHD TYPE: ICD-10-CM

## 2024-03-06 NOTE — TELEPHONE ENCOUNTER
She would need a visit to switch to a different medication especially a controlled substance and we no longer conduct televisits out of [place]. Best that she find a new PCP in [place].

## 2024-03-10 DIAGNOSIS — F41.9 ANXIETY: ICD-10-CM

## 2024-03-11 RX ORDER — LORAZEPAM 0.5 MG/1
0.5 TABLET ORAL
Qty: 14 TABLET | Refills: 0 | Status: SHIPPED | OUTPATIENT
Start: 2024-03-11

## 2024-03-20 DIAGNOSIS — F90.9 ATTENTION DEFICIT HYPERACTIVITY DISORDER (ADHD), UNSPECIFIED ADHD TYPE: ICD-10-CM

## 2024-03-20 DIAGNOSIS — B00.9 HSV-2 (HERPES SIMPLEX VIRUS 2) INFECTION: ICD-10-CM

## 2024-03-20 RX ORDER — DEXTROAMPHETAMINE SACCHARATE, AMPHETAMINE ASPARTATE MONOHYDRATE, DEXTROAMPHETAMINE SULFATE AND AMPHETAMINE SULFATE 5; 5; 5; 5 MG/1; MG/1; MG/1; MG/1
20 CAPSULE, EXTENDED RELEASE ORAL EVERY MORNING
Qty: 30 CAPSULE | Refills: 0 | Status: SHIPPED | OUTPATIENT
Start: 2024-03-20

## 2024-03-20 RX ORDER — VALACYCLOVIR HYDROCHLORIDE 500 MG/1
500 TABLET, FILM COATED ORAL 2 TIMES DAILY
Qty: 14 TABLET | Refills: 0 | Status: SHIPPED | OUTPATIENT
Start: 2024-03-20 | End: 2024-03-27

## 2024-04-29 ENCOUNTER — TELEPHONE (OUTPATIENT)
Age: 34
End: 2024-04-29

## 2024-04-29 DIAGNOSIS — B37.9 YEAST INFECTION: Primary | ICD-10-CM

## 2024-04-29 RX ORDER — FLUCONAZOLE 150 MG/1
150 TABLET ORAL ONCE
Qty: 2 TABLET | Refills: 0 | Status: SHIPPED | OUTPATIENT
Start: 2024-04-29 | End: 2024-04-29

## 2024-04-29 NOTE — TELEPHONE ENCOUNTER
Patient is requesting for diflucan to be sent to [facility] pharmacy  on [address].    Stated she is having symptoms after coming off an antibiotic which happens each time she said.      Please advise.

## 2024-04-30 DIAGNOSIS — F90.9 ATTENTION DEFICIT HYPERACTIVITY DISORDER (ADHD), UNSPECIFIED ADHD TYPE: ICD-10-CM

## 2024-04-30 DIAGNOSIS — F41.9 ANXIETY: ICD-10-CM

## 2024-05-01 RX ORDER — DEXTROAMPHETAMINE SACCHARATE, AMPHETAMINE ASPARTATE MONOHYDRATE, DEXTROAMPHETAMINE SULFATE AND AMPHETAMINE SULFATE 5; 5; 5; 5 MG/1; MG/1; MG/1; MG/1
20 CAPSULE, EXTENDED RELEASE ORAL EVERY MORNING
Qty: 30 CAPSULE | Refills: 0 | Status: SHIPPED | OUTPATIENT
Start: 2024-05-01

## 2024-05-01 RX ORDER — LORAZEPAM 0.5 MG/1
0.5 TABLET ORAL
Qty: 14 TABLET | Refills: 0 | Status: SHIPPED | OUTPATIENT
Start: 2024-05-01

## 2024-05-14 ENCOUNTER — TELEPHONE (OUTPATIENT)
Age: 34
End: 2024-05-14

## 2024-05-14 NOTE — TELEPHONE ENCOUNTER
Patient called in for a prescription of Diflucan to be sent to pharmacy, however based on notes from provider, Lydia MAKI, if symptoms are persisting for the patient, she will need to set up an appt to come in and be seen. Call was disconnected by patient.

## 2024-05-22 ENCOUNTER — TELEPHONE (OUTPATIENT)
Age: 34
End: 2024-05-22

## 2024-05-22 NOTE — TELEPHONE ENCOUNTER
Pt called requesting the EKG from 9/12/2023 to be faxed to:     Office: [facility]  (Psych)  CARLEY: Grecia Sue  Tele: [phone]  Fax: [phone]    Pt would like a return call. Please advise.

## 2024-06-04 DIAGNOSIS — F90.9 ATTENTION DEFICIT HYPERACTIVITY DISORDER (ADHD), UNSPECIFIED ADHD TYPE: ICD-10-CM

## 2024-06-04 RX ORDER — DEXTROAMPHETAMINE SACCHARATE, AMPHETAMINE ASPARTATE MONOHYDRATE, DEXTROAMPHETAMINE SULFATE AND AMPHETAMINE SULFATE 5; 5; 5; 5 MG/1; MG/1; MG/1; MG/1
20 CAPSULE, EXTENDED RELEASE ORAL EVERY MORNING
Qty: 30 CAPSULE | Refills: 0 | Status: SHIPPED | OUTPATIENT
Start: 2024-06-04

## 2024-06-04 NOTE — TELEPHONE ENCOUNTER
Reason for call:   [x] Refill   [] Prior Auth  [] Other:     Office:   [x] PCP/Provider - [facility]  [] Specialty/Provider -     Medication:       [facility] #[id number] - [place] - [address] [phone]     Does the patient have enough for 3 days?   [] Yes   [x] No - Send as HP to POD

## 2024-06-04 NOTE — TELEPHONE ENCOUNTER
Refill must be reviewed and completed by the office or provider. The refill is unable to be approved or denied by the medication management team.    Cannot be delegated  Telemed visit 3/2023

## 2024-06-04 NOTE — TELEPHONE ENCOUNTER
Patient was returning a missed called, I advised her of the previous message and she said she will call back to schedule an appointment.

## 2024-06-05 ENCOUNTER — TELEPHONE (OUTPATIENT)
Age: 34
End: 2024-06-05

## 2024-07-19 ENCOUNTER — TELEPHONE (OUTPATIENT)
Age: 34
End: 2024-07-19

## 2024-07-19 NOTE — TELEPHONE ENCOUNTER
Received call from Alsisa of [employer] to follow up on fax sent 7/16  for medical records for application of patient for life insurance. Rn did not locate document in Media to acknowledge receitpt. Abiigail will re fax form. Alissa is requesting epidite request once form is received. .

## 2024-07-23 ENCOUNTER — TELEPHONE (OUTPATIENT)
Dept: INTERNAL MEDICINE CLINIC | Facility: CLINIC | Age: 34
End: 2024-07-23

## 2024-07-23 NOTE — TELEPHONE ENCOUNTER
Shaila from [employer] called to confirm if documents faxed over for life insurance application was received. Forms were faxed to office on 7/19/24, form are not under media or in chart. Warm transferred to office for further assistance. Please advise.

## 2024-07-23 NOTE — TELEPHONE ENCOUNTER
Shaila with pts life insurance called in asking if we receive forms for her medical records that was faxed over on 7/19. We di not receive any form so she is faxing them to the office again. JOAQUIN

## 2024-11-12 NOTE — TELEPHONE ENCOUNTER
I spoke to the pt. She had questions about PCB. I advised her to have STI testing and repeat pap given new partner. She will follow up in [place]. 
Patient called requesting Medical Records [facility]. Advised patient to fill out Medical Release form on [employer], stated understanding.     Patient inquiring when she had her last PAP, advised 6/30/23. Patient states new provider is stating because of her hx of cone bx she needs to have PAP every 6-12 months. Patient is concerned this information is inaccurate. Patient requesting a return call from provider to discuss concerns and recommendations. Message routed to provider.   
5

## 2024-11-21 ENCOUNTER — TELEPHONE (OUTPATIENT)
Age: 34
End: 2024-11-21

## 2024-11-21 NOTE — TELEPHONE ENCOUNTER
Patient calling to inquire the name of the procedure she had in 11/2021 - Reviewed per Dr. Burnett, patient had LEEP Cone Biopsy. Patient verbalzies understanding.